# Patient Record
Sex: MALE | Race: WHITE | NOT HISPANIC OR LATINO | Employment: FULL TIME | URBAN - METROPOLITAN AREA
[De-identification: names, ages, dates, MRNs, and addresses within clinical notes are randomized per-mention and may not be internally consistent; named-entity substitution may affect disease eponyms.]

---

## 2017-06-27 ENCOUNTER — HOSPITAL ENCOUNTER (EMERGENCY)
Facility: CLINIC | Age: 32
Discharge: HOME | End: 2017-06-27
Attending: NURSE PRACTITIONER | Admitting: NURSE PRACTITIONER
Payer: COMMERCIAL

## 2017-06-27 VITALS
DIASTOLIC BLOOD PRESSURE: 81 MMHG | RESPIRATION RATE: 20 BRPM | TEMPERATURE: 97.1 F | OXYGEN SATURATION: 99 % | SYSTOLIC BLOOD PRESSURE: 137 MMHG | HEART RATE: 96 BPM

## 2017-06-27 DIAGNOSIS — H10.32 ACUTE CONJUNCTIVITIS OF LEFT EYE, UNSPECIFIED ACUTE CONJUNCTIVITIS TYPE: ICD-10-CM

## 2017-06-27 PROCEDURE — 99282 EMERGENCY DEPT VISIT SF MDM: CPT | Performed by: NURSE PRACTITIONER

## 2017-06-27 PROCEDURE — 99283 EMERGENCY DEPT VISIT LOW MDM: CPT | Mod: Z6 | Performed by: NURSE PRACTITIONER

## 2017-06-27 RX ORDER — OFLOXACIN 3 MG/ML
1 SOLUTION/ DROPS OPHTHALMIC 4 TIMES DAILY
Qty: 2 ML | Refills: 0 | Status: SHIPPED | OUTPATIENT
Start: 2017-06-27 | End: 2017-07-04

## 2017-06-27 ASSESSMENT — ENCOUNTER SYMPTOMS
EYE DISCHARGE: 1
EYE ITCHING: 1
EYE REDNESS: 1

## 2017-06-27 ASSESSMENT — VISUAL ACUITY
OS: 20/50
OD: 20/40

## 2017-06-27 NOTE — ED NOTES
Pt here with spots noted to external left eye. He has history of wood injury that caused fungal infection and lost some vision with that eye so has some anxiety of having problems with his left eye.

## 2017-06-27 NOTE — ED AVS SNAPSHOT
[facility] Emergency Department    [address] [place] [address]    Phone:  [phone]    Fax:  [phone]                                       Margarito Sofia   MRN: [id number]    Department:  [facility] Emergency Department   Date of Visit:  6/27/2017           Patient Information     Date Of Birth          1985        Your diagnoses for this visit were:     Acute conjunctivitis of left eye, unspecified acute conjunctivitis type        You were seen by Stacey Astudillo APRN CNP.      Follow-up Information     Follow up with [facility] Emergency Department.    Specialty:  EMERGENCY MEDICINE    Why:  If symptoms worsen or the nearest eye doctor to you in [place]    Contact information:    [address] [place] [address] [phone]    Additional information:    From [address]: Exit at [address] on south side of [place]. Turn right on [address]. Turn left at stoplight on [address]. [facility] will be in view two blocks ahead        Discharge Instructions         Conjunctivitis, Nonspecific    The membrane that covers the white part of your eye (the conjunctiva) is inflamed. Inflammation happens when your body responds to an injury, allergic reaction, infection, or illness. Symptoms of inflammation in the eye may include redness, irritation, itching, swelling, or burning. These symptoms should go away within the next 24 hours. Conjunctivitis may be related to a particle that was in your eye. If so, it may wash out with your tears or irrigation treatment. Being exposed to liquid chemicals or fumes may also cause this reaction.   Home care    Apply a cold pack (ice in a plastic bag, wrapped in a towel) over the eye for 20 minutes at a time. This will reduce pain.    Artificial tears may be prescribed to reduce irritation or redness.  These should be used 3 to 4 times a day.    You may use acetaminophen or  ibuprofen to control pain, unless another medicine was prescribed.(Note: If you have chronic liver or kidney disease, or if you have ever had a stomach ulcer or gastrointestinal bleeding, talk with your healthcare provider before using these medicines.)  Follow-up care  Follow up with your healthcare provider, or as advised.  When to seek medical advice  Call your healthcare provider right away if any of these occur:    Increased eyelid swelling    Increased eye pain    Increased redness or drainage from the eye    Increased blurry vision or increased sensitivity to light    Failure of normal vision to return within 24 to 48 hours  Date Last Reviewed: 6/14/2015 2000-2017 [facility]. [address] [place] [address]. All rights reserved. This information is not intended as a substitute for professional medical care. Always follow your healthcare professional's instructions.          24 Hour Appointment Hotline       To make an appointment at any [facility], call [phone] ([phone]). If you don't have a family doctor or clinic, we will help you find one. [facility] are conveniently located to serve the needs of you and your family.             Review of your medicines      START taking        Dose / Directions Last dose taken    ofloxacin 0.3 % ophthalmic solution   Commonly known as:  OCUFLOX   Dose:  1 drop   Quantity:  2 mL        Place 1 drop Into the left eye 4 times daily for 7 days   Refills:  0                Prescriptions were sent or printed at these locations (1 Prescription)                   [facility] [place], [place] - [facility] [address]   [address] [facility] [address]    Telephone:  [phone]   Fax:  [phone]   Hours:                  E-Prescribed (1 of 1)         ofloxacin (OCUFLOX) 0.3 % ophthalmic solution                Orders Needing Specimen Collection     None      Pending Results     No orders found from 6/25/2017 to  "2017.            Pending Culture Results     No orders found from 2017 to 2017.            Pending Results Instructions     If you had any lab results that were not finalized at the time of your Discharge, you can call the ED Lab Result RN at [phone]. You will be contacted by this team for any positive Lab results or changes in treatment. The nurses are available 7 days a week from 10A to 6:30P.  You can leave a message 24 hours per day and they will return your call.        Thank you for choosing [facility]       Thank you for choosing [facility] for your care. Our goal is always to provide you with excellent care. Hearing back from our patients is one way we can continue to improve our services. Please take a few minutes to complete the written survey that you may receive in the mail after you visit with us. Thank you!        [employer] Information     [employer] lets you send messages to your doctor, view your test results, renew your prescriptions, schedule appointments and more. To sign up, go to [email or web address] . Click on \"Log in\" on the left side of the screen, which will take you to the Welcome page. Then click on \"Sign up Now\" on the right side of the page.     You will be asked to enter the access code listed below, as well as some personal information. Please follow the directions to create your username and password.     Your access code is: [email or web address]  Expires: 2017  1:31 PM     Your access code will  in 90 days. If you need help or a new code, please call your [facility] clinic or [phone].        Care EveryWhere ID     This is your Care EveryWhere ID. This could be used by other organizations to access your [facility] medical records  [id number]        Equal Access to Services     LEON NASH : Chelle Leroy, zack alfaro, umesh brown. So [facility] [phone].    ATENCIÓN: Si preston chavarria " a shipley disposición servicios gratuitos de asistencia lingüística. [place] al [phone].    We comply with applicable federal civil rights laws and [place] laws. We do not discriminate on the basis of race, color, national origin, age, disability sex, sexual orientation or gender identity.            After Visit Summary       This is your record. Keep this with you and show to your community pharmacist(s) and doctor(s) at your next visit.

## 2017-06-27 NOTE — ED AVS SNAPSHOT
[facility] Emergency Department    [address] [place] [address]    Phone:  [phone]    Fax:  [phone]                                       Margarito Sofia   MRN: [id number]    Department:  [facility] Emergency Department   Date of Visit:  6/27/2017           After Visit Summary Signature Page     I have received my discharge instructions, and my questions have been answered. I have discussed any challenges I see with this plan with the nurse or doctor.    ..........................................................................................................................................  Patient/Patient Representative Signature      ..........................................................................................................................................  Patient Representative Print Name and Relationship to Patient    ..................................................               ................................................  Date                                            Time    ..........................................................................................................................................  Reviewed by Signature/Title    ...................................................              ..............................................  Date                                                            Time

## 2017-06-27 NOTE — DISCHARGE INSTRUCTIONS
Conjunctivitis, Nonspecific    The membrane that covers the white part of your eye (the conjunctiva) is inflamed. Inflammation happens when your body responds to an injury, allergic reaction, infection, or illness. Symptoms of inflammation in the eye may include redness, irritation, itching, swelling, or burning. These symptoms should go away within the next 24 hours. Conjunctivitis may be related to a particle that was in your eye. If so, it may wash out with your tears or irrigation treatment. Being exposed to liquid chemicals or fumes may also cause this reaction.   Home care    Apply a cold pack (ice in a plastic bag, wrapped in a towel) over the eye for 20 minutes at a time. This will reduce pain.    Artificial tears may be prescribed to reduce irritation or redness.  These should be used 3 to 4 times a day.    You may use acetaminophen or ibuprofen to control pain, unless another medicine was prescribed.(Note: If you have chronic liver or kidney disease, or if you have ever had a stomach ulcer or gastrointestinal bleeding, talk with your healthcare provider before using these medicines.)  Follow-up care  Follow up with your healthcare provider, or as advised.  When to seek medical advice  Call your healthcare provider right away if any of these occur:    Increased eyelid swelling    Increased eye pain    Increased redness or drainage from the eye    Increased blurry vision or increased sensitivity to light    Failure of normal vision to return within 24 to 48 hours  Date Last Reviewed: 6/14/2015 2000-2017 [employer]. [address] [place] [address]. All rights reserved. This information is not intended as a substitute for professional medical care. Always follow your healthcare professional's instructions.

## 2017-06-27 NOTE — ED PROVIDER NOTES
History     Chief Complaint   Patient presents with     Eye Problem     HPI  Margarito Sofia is a 31 year old male who presents to the ED today with c/o left eye itching and watery drainage since this morning.  Patient denies any trauma or pain.  Patient is very anxious about this as almost exactly one year ago he ended up with a traumatic ulcer in his left eye secondary to wood getting into his eye, unfortunately his injury was over his lower pupil and he ended up with vision loss.  Patient does not wear contacts or glasses and just recently moved here from [place].  He has yet to establish a primary care provider.     I have reviewed the Medications, Allergies, Past Medical and Surgical History, and Social History in the Epic system.    Allergies: No Known Allergies      No current facility-administered medications on file prior to encounter.   No current outpatient prescriptions on file prior to encounter.    There is no problem list on file for this patient.      History reviewed. No pertinent surgical history.    Social History   Substance Use Topics     Smoking status: Not on file     Smokeless tobacco: Not on file     Alcohol use Not on file         There is no immunization history on file for this patient.    BMI: There is no height or weight on file to calculate BMI.      Review of Systems   Eyes: Positive for discharge, redness and itching.   All other systems reviewed and are negative.      Physical Exam   BP: (!) 143/99  Pulse: 110  Temp: 97.1  F (36.2  C)  Resp: 16  SpO2: 98 %  Physical Exam   Constitutional: He is oriented to person, place, and time. He appears well-developed and well-nourished.   Eyes: EOM are normal. Pupils are equal, round, and reactive to light. Lids are everted and swept, no foreign bodies found. Left eye exhibits discharge (Watery). Left eye exhibits no chemosis and no hordeolum. No foreign body present in the left eye. Left conjunctiva is injected. Left conjunctiva has no  hemorrhage. No scleral icterus.   Fundoscopic exam:       The left eye shows no hemorrhage.   Slit lamp exam:       The left eye shows no corneal ulcer and no foreign body.   Neck: Normal range of motion. Neck supple.   Cardiovascular: Regular rhythm.    Tachycardic at 110   Pulmonary/Chest: Effort normal and breath sounds normal.   Musculoskeletal: Normal range of motion.   Neurological: He is alert and oriented to person, place, and time.   Skin: Skin is warm and dry. He is not diaphoretic.   Psychiatric:   Anxious       ED Course     ED Course     Procedures    Labs Ordered and Resulted from Time of ED Arrival Up to the Time of Departure from the ED - No data to display    Assessments & Plan (with Medical Decision Making)  Margarito is a 31 year old male who presents to the ED today with c/o left eye itching and watering since this morning.  Please refer to HPI and focused exam.  Patient's exam is consistent with an acute conjunctivitis, no foreign bodies identified, no trauma, no evidence of ulcerations.  Patient was reassured, I will start him on ofloxacin to cover any bacterial source, recent lives in [place], I instructed him to follow-up with an eye doctor around his area if these symptoms do not improve or worsen over the next 1-2 days.  Patient verbalizes understanding of discharge instructions and was discharged in stable condition.       I have reviewed the nursing notes.    I have reviewed the findings, diagnosis, plan and need for follow up with the patient.    Discharge Medication List as of 6/27/2017  1:31 PM      START taking these medications    Details   ofloxacin (OCUFLOX) 0.3 % ophthalmic solution Place 1 drop Into the left eye 4 times daily for 7 days, Disp-2 mL, R-0, E-Prescribe             Final diagnoses:   Acute conjunctivitis of left eye, unspecified acute conjunctivitis type       6/27/2017   [facility] EMERGENCY DEPARTMENT     Stacey Astudillo, TTAY CNP  06/27/17  [id number]

## 2019-08-03 NOTE — TELEPHONE ENCOUNTER
FUTURE VISIT INFORMATION      FUTURE VISIT INFORMATION:    Date: 8.29.19    Time: 6:00    Location: [facility]  REFERRAL INFORMATION:    Referring provider:  Dr. Donna Dooley    Referring providers clinic:  [facility]    Reason for visit/diagnosis:  New HS    RECORDS REQUESTED FROM:       Clinic name Comments Records Status Imaging Status   [facility] 6.21.19 Dr. Dooley In [facility]  [facility] Everywhere     2.8.19 Dr. Dooley In [facility]  [facility] Everywhere

## 2019-08-29 ENCOUNTER — OFFICE VISIT (OUTPATIENT)
Dept: DERMATOLOGY | Facility: CLINIC | Age: 34
End: 2019-08-29
Payer: COMMERCIAL

## 2019-08-29 ENCOUNTER — TELEPHONE (OUTPATIENT)
Dept: DERMATOLOGY | Facility: CLINIC | Age: 34
End: 2019-08-29

## 2019-08-29 ENCOUNTER — PRE VISIT (OUTPATIENT)
Dept: DERMATOLOGY | Facility: CLINIC | Age: 34
End: 2019-08-29

## 2019-08-29 DIAGNOSIS — L73.2 HIDRADENITIS SUPPURATIVA: Primary | ICD-10-CM

## 2019-08-29 RX ORDER — PREDNISONE 10 MG/1
TABLET ORAL
Qty: 147 TABLET | Refills: 0 | Status: SHIPPED | OUTPATIENT
Start: 2019-08-29 | End: 2019-10-17

## 2019-08-29 RX ORDER — KETOCONAZOLE 20 MG/ML
SHAMPOO TOPICAL
COMMUNITY
Start: 2018-06-01 | End: 2020-02-13

## 2019-08-29 RX ORDER — METHYLPREDNISOLONE SODIUM SUCCINATE 125 MG/2ML
40 INJECTION, POWDER, LYOPHILIZED, FOR SOLUTION INTRAMUSCULAR; INTRAVENOUS DAILY
Status: CANCELLED | OUTPATIENT
Start: 2019-08-29

## 2019-08-29 RX ORDER — FLUOROMETHOLONE 0.1 %
SUSPENSION, DROPS(FINAL DOSAGE FORM)(ML) OPHTHALMIC (EYE)
Refills: 5 | COMMUNITY
Start: 2019-07-16 | End: 2022-10-31

## 2019-08-29 RX ORDER — SULFAMETHOXAZOLE/TRIMETHOPRIM 800-160 MG
1 TABLET ORAL 2 TIMES DAILY
Qty: 180 TABLET | Refills: 0 | Status: SHIPPED | OUTPATIENT
Start: 2019-08-29 | End: 2019-10-17

## 2019-08-29 RX ORDER — DOXYCYCLINE HYCLATE 50 MG/1
TABLET, FILM COATED ORAL
COMMUNITY
Start: 2018-12-20 | End: 2019-10-17

## 2019-08-29 RX ORDER — RIFAMPIN 300 MG/1
CAPSULE ORAL
COMMUNITY
Start: 2019-06-21 | End: 2019-09-26

## 2019-08-29 ASSESSMENT — PAIN SCALES - GENERAL: PAINLEVEL: MODERATE PAIN (5)

## 2019-08-29 NOTE — PROGRESS NOTES
"[facility] Dermatology Note    Dermatology Surgery Clinic  [facility]  Cl[facility]  [address] [place] [address]    Dermatology Problem List:  1. Hidradenitis suppurativa.  -started on infliximab, bactrim, and prednisone 08/29/19  2. Acne vulgaris  -Has tried Accutane and a number of antibiotics    Encounter Date: Aug 29, 2019    CC:  Chief Complaint   Patient presents with     Derm Problem     Jarrod is here for concerns of HS.         History of Present Illness:  Mr. Margarito Sofia is a 33 year old male who presents in consultation for Hidradenitis suppurativa. The patient reports that his HS started about a year ago. He states that the last doctor he saw when he moved here told him he had HS. He notes that his HS symptoms have worsened in the last couple of months.    Denies diarrhea and constipation. Over the last year, he states that he has gained about 15-20 lbs. He reports that he is quite stressed but not depressed. On a pain scale of 1-10, the patient reports his pain a 5. However, he has a stabbing sensation on the extremities and abdomen. He denies that he smokes.     Regarding prior medications, he has been on antibiotics for acne almost all his life. He feels that antibiotics help to caron symptoms but there was no significant help. He has tried Bactrim with some relief. The patient admits to doing 2 cycles of Accutane for acne as well, but the acne would return for a year or two. He did Accutane at [age], so it's been about 11 years. He shares that he did have a \"bad eye injury\" about 2 years ago that he took antifungals for almost a year with no issues. 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  He has also tried clindamycin and rifampin for 2 months now, but it is not helping. He shares that his mother has taken Humira due to inflammatory bowel with side effects.   He states that " he has tried Prednisone for 1.5 weeks in the past and it helped with pain and inflammation.    Within the last year, he shares that he has moved out here from [place] for work. Otherwise, there have been no other changes that he can think of. The patient shares that he is a .  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    On exam, the patient was noted to have a mole on the R forehead. He states that it has been there awhile.      Past Medical History:   Acne s/p isotretinoin  Traumatic fungal infection of eye s/p 1 year of antifungals completed 1 year ago  Heart murmur per patient     Social History:  Patient reports that he has never smoked. He has never used smokeless tobacco.    Family History:  Mother with inflammatory bowel disease    Medications:  Current Outpatient Medications   Medication Sig Dispense Refill     Doxycycline Hyclate 50 MG TABS        fluorometholone (FML LIQUIFILM) 0.1 % ophthalmic suspension SHAKE LQ AND INT 1 GTT IN OU D  5     ketoconazole (NIZORAL) 2 % external shampoo Apply to the scalp and upper body as directed twice per week.       rifampin (RIFADIN) 300 MG capsule 1 tab po BID       chlorhexidine (HIBICLENS) 4 % liquid Wash boil-prone areas three times weekly while in shower         No Known Allergies    Review of Systems:  -As per HPI  -Constitutional: Otherwise feeling well today, in usual state of health.  -Skin: As above in HPI. No additional skin concerns.    Physical exam  GEN: This is a well developed, well-nourished male in no acute distress, in a pleasant mood.    SKIN: Full skin, which includes the head/face, both arms, chest, back, abdomen,both legs, genitalia and/or groin buttocks, digits and/or nails, was examined.  Scattered and closed comedones and pustules around the mouth and nose  Dark brown 3 mm macule on R forehead inglycan appearance with dots and a hint of reticular network  Draining tunneling plaque on posterior neck  Numerous  acneiform throughout mouth  Scattered acneiform papules through the dorsal neck and chest  2% BSA draining plaque R axilla; 3+ erythema, 1+ induration, 3+ drainage  1 inflammatory in the L axilla and a few inflammatory papules  -Linear erosions in supergluteal cleft  -Ulceration along the superperirectal area, 3mm  -Scattered inflammatory acneiform papules on the buttock  -5 cm abscess on the L abdomen  -4% BSA violaceous plaques with 1 cm ulceration in the suprapubic region extending into the lower abdominal fold  -Hints of linear ulcerations in the L inguinal fold  -Violaceous nodules coalescing into inflammatory plaque  -1% BSA in R and L inguinal fold  -Inflammatory nodules throughout the scrotum  -No other lesions of concern on areas examined.       Impression/Plan:  1. Hidradenitis suppurativa.    The patient was started on infliximab q4 weeks with 40mg methlypred    Labs: quantiferon gold, rubella, mumps, CBC w/platelets, comprehensive metabolic panel, Hep B core antibody, Hep B surface antibody, Hep B surface antigen, Hep C antibody, HIV antigen antibody combo,     Prescribed the patient Prednisone 60mg x7 days, 50mg x7 days, 40mg x7 days, 30mg x7 days, 20mg x 7 days, 10mg x 7 days. We will give the Prednisone for about 8 weeks to work and we will see how the patient does.      Follow up in about 6 weeks.  Staff Involved:    Scribe Disclosure  I, Michelle Hunter, am serving as a scribe to document services personally performed by Dr. Dennis Denny, based on data collection and the provider's statements to me.     Provider Disclosure:   The documentation recorded by the scribe accurately reflects the services I personally performed and the decisions made by me.    Dennis Denny MD, FAAD    Departments of Internal Medicine and Dermatology  [facility]  [phone]

## 2019-08-29 NOTE — LETTER
"8/29/2019       RE: Margarito Sofia  [address] [facility] [address]     Dear Colleague,    Thank you for referring your patient, Margarito Sofia, to the [facility] DERMATOLOGY at [facility]. Please see a copy of my visit note below.    [facility] Dermatology Note    Dermatology Surgery Clinic  [facility]  [address] [place] [id number]    Dermatology Problem List:  1. Hidradenitis suppurativa.  -started on infliximab, bactrim, and prednisone 08/29/19  2. Acne vulgaris  -Has tried Accutane and a number of antibiotics    Encounter Date: Aug 29, 2019    CC:  Chief Complaint   Patient presents with     Derm Problem     Jarrod is here for concerns of HS.         History of Present Illness:  Mr. Margarito Sofia is a 33 year old male who presents in consultation for Hidradenitis suppurativa. The patient reports that his HS started about a year ago. He states that the last doctor he saw when he moved here told him he had HS. He notes that his HS symptoms have worsened in the last couple of months.    Denies diarrhea and constipation. Over the last year, he states that he has gained about 15-20 lbs. He reports that he is quite stressed but not depressed. On a pain scale of 1-10, the patient reports his pain a 5. However, he has a stabbing sensation on the extremities and abdomen. He denies that he smokes.     Regarding prior medications, he has been on antibiotics for acne almost all his life. He feels that antibiotics help to caron symptoms but there was no significant help. He has tried Bactrim with some relief. The patient admits to doing 2 cycles of Accutane for acne as well, but the acne would return for a year or two. He did Accutane at [age], so it's been about 11 years. He shares that he did have a \"bad eye injury\" about 2 years ago that he took antifungals for almost a year with no issues. 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  He has also tried clindamycin and rifampin for 2 months now, but it is not helping. He shares that his mother has taken Humira due to inflammatory bowel with side effects.   He states that he has tried Prednisone for 1.5 weeks in the past and it helped with pain and inflammation.    Within the last year, he shares that he has moved out here from [place] for work. Otherwise, there have been no other changes that he can think of. The patient shares that he is a .  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    On exam, the patient was noted to have a mole on the R forehead. He states that it has been there awhile.      Past Medical History:   Acne s/p isotretinoin  Traumatic fungal infection of eye s/p 1 year of antifungals completed 1 year ago  Heart murmur per patient     Social History:  Patient reports that he has never smoked. He has never used smokeless tobacco.    Family History:  Mother with inflammatory bowel disease    Medications:  Current Outpatient Medications   Medication Sig Dispense Refill     Doxycycline Hyclate 50 MG TABS        fluorometholone (FML LIQUIFILM) 0.1 % ophthalmic suspension SHAKE LQ AND INT 1 GTT IN OU D  5     ketoconazole (NIZORAL) 2 % external shampoo Apply to the scalp and upper body as directed twice per week.       rifampin (RIFADIN) 300 MG capsule 1 tab po BID       chlorhexidine (HIBICLENS) 4 % liquid Wash boil-prone areas three times weekly while in shower         No Known Allergies    Review of Systems:  -As per HPI  -Constitutional: Otherwise feeling well today, in usual state of health.  -Skin: As above in HPI. No additional skin concerns.    Physical exam  GEN: This is a well developed, well-nourished male in no acute distress, in a pleasant mood.    SKIN: Full skin, which includes the head/face, both arms, chest, back, abdomen,both legs,  genitalia and/or groin buttocks, digits and/or nails, was examined.  Scattered and closed comedones and pustules around the mouth and nose  Dark brown 3 mm macule on R forehead inglycan appearance with dots and a hint of reticular network  Draining tunneling plaque on posterior neck  Numerous acneiform throughout mouth  Scattered acneiform papules through the dorsal neck and chest  2% BSA draining plaque R axilla; 3+ erythema, 1+ induration, 3+ drainage  1 inflammatory in the L axilla and a few inflammatory papules  -Linear erosions in supergluteal cleft  -Ulceration along the superperirectal area, 3mm  -Scattered inflammatory acneiform papules on the buttock  -5 cm abscess on the L abdomen  -4% BSA violaceous plaques with 1 cm ulceration in the suprapubic region extending into the lower abdominal fold  -Hints of linear ulcerations in the L inguinal fold  -Violaceous nodules coalescing into inflammatory plaque  -1% BSA in R and L inguinal fold  -Inflammatory nodules throughout the scrotum  -No other lesions of concern on areas examined.       Impression/Plan:  1. Hidradenitis suppurativa.    The patient was started on infliximab q4 weeks with 40mg methlypred    Labs: quantiferon gold, rubella, mumps, CBC w/platelets, comprehensive metabolic panel, Hep B core antibody, Hep B surface antibody, Hep B surface antigen, Hep C antibody, HIV antigen antibody combo,     Prescribed the patient Prednisone 60mg x7 days, 50mg x7 days, 40mg x7 days, 30mg x7 days, 20mg x 7 days, 10mg x 7 days. We will give the Prednisone for about 8 weeks to work and we will see how the patient does.      Follow up in about 6 weeks.  Staff Involved:    Scribe Disclosure  I, Michelle Hunter, am serving as a scribe to document services personally performed by Dr. Dennis Denny, based on data collection and the provider's statements to me.     Provider Disclosure:   The documentation recorded by the scribe accurately reflects the services I personally  performed and the decisions made by me.    Dennis Denny MD, FAAD    Departments of Internal Medicine and Dermatology  [facility]  [phone]        Again, thank you for allowing me to participate in the care of your patient.      Sincerely,    Dennis Denny MD

## 2019-08-29 NOTE — LETTER
Date:September 6, 2019      Patient was self referred, no letter generated. Do not send.        [facility] Health Information

## 2019-08-29 NOTE — NURSING NOTE
Dermatology Rooming Note    Margarito Sofia's goals for this visit include:   Chief Complaint   Patient presents with     Derm Problem     Jarrod is here for concerns of HS.         Deandra Esteves LPN

## 2019-08-30 DIAGNOSIS — L73.2 HIDRADENITIS SUPPURATIVA: ICD-10-CM

## 2019-08-30 LAB
ALBUMIN SERPL-MCNC: 3.1 G/DL (ref 3.4–5)
ALP SERPL-CCNC: 137 U/L (ref 40–150)
ALT SERPL W P-5'-P-CCNC: 20 U/L (ref 0–70)
ANION GAP SERPL CALCULATED.3IONS-SCNC: 6 MMOL/L (ref 3–14)
AST SERPL W P-5'-P-CCNC: 12 U/L (ref 0–45)
BILIRUB SERPL-MCNC: 0.2 MG/DL (ref 0.2–1.3)
BUN SERPL-MCNC: 9 MG/DL (ref 7–30)
CALCIUM SERPL-MCNC: 9 MG/DL (ref 8.5–10.1)
CHLORIDE SERPL-SCNC: 104 MMOL/L (ref 94–109)
CO2 SERPL-SCNC: 28 MMOL/L (ref 20–32)
CREAT SERPL-MCNC: 0.82 MG/DL (ref 0.66–1.25)
ERYTHROCYTE [DISTWIDTH] IN BLOOD BY AUTOMATED COUNT: 16.2 % (ref 10–15)
GFR SERPL CREATININE-BSD FRML MDRD: >90 ML/MIN/{1.73_M2}
GLUCOSE SERPL-MCNC: 98 MG/DL (ref 70–99)
HBV CORE AB SERPL QL IA: NONREACTIVE
HBV SURFACE AB SERPL IA-ACNC: 58.75 M[IU]/ML
HBV SURFACE AG SERPL QL IA: NONREACTIVE
HCT VFR BLD AUTO: 37.6 % (ref 40–53)
HCV AB SERPL QL IA: NONREACTIVE
HGB BLD-MCNC: 11.3 G/DL (ref 13.3–17.7)
HIV 1+2 AB+HIV1 P24 AG SERPL QL IA: NONREACTIVE
MCH RBC QN AUTO: 24.2 PG (ref 26.5–33)
MCHC RBC AUTO-ENTMCNC: 30.1 G/DL (ref 31.5–36.5)
MCV RBC AUTO: 81 FL (ref 78–100)
PLATELET # BLD AUTO: 528 10E9/L (ref 150–450)
POTASSIUM SERPL-SCNC: 3.9 MMOL/L (ref 3.4–5.3)
PROT SERPL-MCNC: 8.3 G/DL (ref 6.8–8.8)
RBC # BLD AUTO: 4.67 10E12/L (ref 4.4–5.9)
SODIUM SERPL-SCNC: 138 MMOL/L (ref 133–144)
WBC # BLD AUTO: 15.2 10E9/L (ref 4–11)

## 2019-08-30 NOTE — TELEPHONE ENCOUNTER
Patient to begin infusions asap. Sending to [facility] to check insurance coverage.      Deandra Esteves LPN

## 2019-08-31 LAB
MEV IGG SER QL IA: 7.2 AI (ref 0–0.8)
MUV IGG SER QL IA: 3.6 AI (ref 0–0.8)
RUBV IGG SERPL IA-ACNC: 69 IU/ML

## 2019-09-03 LAB
GAMMA INTERFERON BACKGROUND BLD IA-ACNC: 0.06 IU/ML
M TB IFN-G BLD-IMP: NEGATIVE
M TB IFN-G CD4+ BCKGRND COR BLD-ACNC: >10 IU/ML
MITOGEN IGNF BCKGRD COR BLD-ACNC: 0 IU/ML
MITOGEN IGNF BCKGRD COR BLD-ACNC: 0 IU/ML

## 2019-09-12 ENCOUNTER — INFUSION THERAPY VISIT (OUTPATIENT)
Dept: INFUSION THERAPY | Facility: CLINIC | Age: 34
End: 2019-09-12
Attending: DERMATOLOGY
Payer: COMMERCIAL

## 2019-09-12 VITALS
HEART RATE: 92 BPM | SYSTOLIC BLOOD PRESSURE: 119 MMHG | TEMPERATURE: 97.6 F | DIASTOLIC BLOOD PRESSURE: 82 MMHG | WEIGHT: 290 LBS

## 2019-09-12 DIAGNOSIS — L73.2 HIDRADENITIS SUPPURATIVA: Primary | ICD-10-CM

## 2019-09-12 PROCEDURE — 25000128 H RX IP 250 OP 636: Mod: ZF | Performed by: DERMATOLOGY

## 2019-09-12 PROCEDURE — 96413 CHEMO IV INFUSION 1 HR: CPT

## 2019-09-12 PROCEDURE — 96415 CHEMO IV INFUSION ADDL HR: CPT

## 2019-09-12 PROCEDURE — 25800030 ZZH RX IP 258 OP 636: Mod: ZF | Performed by: DERMATOLOGY

## 2019-09-12 RX ORDER — METHYLPREDNISOLONE SODIUM SUCCINATE 125 MG/2ML
40 INJECTION, POWDER, LYOPHILIZED, FOR SOLUTION INTRAMUSCULAR; INTRAVENOUS DAILY
Status: CANCELLED | OUTPATIENT
Start: 2019-09-26

## 2019-09-12 RX ADMIN — INFLIXIMAB 700 MG: 100 INJECTION, POWDER, LYOPHILIZED, FOR SOLUTION INTRAVENOUS at 07:34

## 2019-09-12 NOTE — PATIENT INSTRUCTIONS
Patient Education     Infliximab Solution for injection  What is this medicine?  INFLIXIMAB (in FLIX i mab) is used to treat Crohn's disease and ulcerative colitis. It is also used to treat ankylosing spondylitis, psoriasis, and some forms of arthritis.  This medicine may be used for other purposes; ask your health care provider or pharmacist if you have questions.  What should I tell my health care provider before I take this medicine?  They need to know if you have any of these conditions:    diabetes    exposure to tuberculosis    heart failure    hepatitis or liver disease    immune system problems    infection    lung or breathing disease, like COPD    multiple sclerosis    current or past resident of [place]    seizure disorder    an unusual or allergic reaction to infliximab, mouse proteins, other medicines, foods, dyes, or preservatives    pregnant or trying to get pregnant    breast-feeding  How should I use this medicine?  This medicine is for injection into a vein. It is usually given by a health care professional in a hospital or clinic setting.  A special MedGuide will be given to you by the pharmacist with each prescription and refill. Be sure to read this information carefully each time.  Talk to your pediatrician regarding the use of this medicine in children. Special care may be needed.  Overdosage: If you think you have taken too much of this medicine contact a poison control center or emergency room at once.  NOTE: This medicine is only for you. Do not share this medicine with others.  What if I miss a dose?  It is important not to miss your dose. Call your doctor or health care professional if you are unable to keep an appointment.  What may interact with this medicine?  Do not take this medicine with any of the following medications:    anakinra    rilonacept  This medicine may also interact with the following medications:    vaccines  This list may not describe all  possible interactions. Give your health care provider a list of all the medicines, herbs, non-prescription drugs, or dietary supplements you use. Also tell them if you smoke, drink alcohol, or use illegal drugs. Some items may interact with your medicine.  What should I watch for while using this medicine?  Visit your doctor or health care professional for regular checks on your progress.  If you get a cold or other infection while receiving this medicine, call your doctor or health care professional. Do not treat yourself. This medicine may decrease your body's ability to fight infections. Before beginning therapy, your doctor may do a test to see if you have been exposed to tuberculosis.  This medicine may make the symptoms of heart failure worse in some patients. If you notice symptoms such as increased shortness of breath or swelling of the ankles or legs, contact your health care provider right away.  If you are going to have surgery or dental work, tell your health care professional or dentist that you have received this medicine.  If you take this medicine for plaque psoriasis, stay out of the sun. If you cannot avoid being in the sun, wear protective clothing and use sunscreen. Do not use sun lamps or tanning beds/booths.  What side effects may I notice from receiving this medicine?  Side effects that you should report to your doctor or health care professional as soon as possible:    allergic reactions like skin rash, itching or hives, swelling of the face, lips, or tongue    chest pain    fever or chills, usually related to the infusion    muscle or joint pain    red, scaly patches or raised bumps on the skin    signs of infection - fever or chills, cough, sore throat, pain or difficulty passing urine    swollen lymph nodes in the neck, underarm, or groin areas    unexplained weight loss    unusual bleeding or bruising    unusually weak or tired    yellowing of the eyes or skin  Side effects that usually  do not require medical attention (report to your doctor or health care professional if they continue or are bothersome):    headache    heartburn or stomach pain    nausea, vomiting  This list may not describe all possible side effects. Call your doctor for medical advice about side effects. You may report side effects to FDA at 8-469-FDA-1859.  Where should I keep my medicine?  This drug is given in a hospital or clinic and will not be stored at home.  NOTE:This sheet is a summary. It may not cover all possible information. If you have questions about this medicine, talk to your doctor, pharmacist, or health care provider. Copyright  2016 Gold Standard

## 2019-09-12 NOTE — PROGRESS NOTES
Nursing Note  Margarito Sofia [facility] for:   Chief Complaint   Patient presents with     Infusion     Remicade     During today's [facility] appointment, orders from Dr. Denny were completed.  Frequency: today is dose #1 of 2 Q14d, then week 6, then Q4 weeks.    Progress note:  Patient identification verified by name and date of birth.  Assessment completed.  Vitals recorded in Doc Flowsheets.  Patient was provided with education regarding infusion and possible side effects.  Patient verbalized understanding.     present during visit today: Not Applicable.    Treatment Conditions: ~~~ NOTE: If the patient answers yes to any of the questions below, hold the infusion and contact ordering provider or on-call provider.    1. Have you recently had an elevated temperature, fever, chills, productive cough, coughing for 3 weeks or longer or hemoptysis, abnormal vital signs, night sweats,  chest pain or have you noticed a decrease in your appetite, unexplained weight loss or fatigue? No  2. Do you have any open wounds or new incisions? Yes, HS; MD aware- no s/s infection   3. Do you have any recent or upcoming hospitalizations, surgeries or dental procedures? No  4. Do you currently have or recently have had any signs of illness or infection or are you on any antibiotics? Yes, pt is on Bactrim, but has been on abx for >4 months for HS and reports no active signs of infection  5. Have you had any new, sudden or worsening abdominal pain? No  6. Have you or anyone in your household received a live vaccination in the past 4 weeks? Please note:  No live vaccines while on biologic/chemotherapy until 6 months after the last treatment.  Patient can receive the flu vaccine (shot only) and the pneumovax.  It is optimal for the patient to get these vaccines mid cycle, but they can be given at any time as long as it is not on the day of the  infusion. No  7. Have you recently been diagnosed with any new nervous system diseases (ie. Multiple sclerosis, Guillain [place], seizures, neurological changes) or cancer diagnosis? No  8. Are you on any form of radiation or chemotherapy? No  9. Are you pregnant or breast feeding or do you have plans of pregnancy in the future? No  10. Have you been having any signs of worsening depression or suicidal ideations?  (benlysta only) No  11. Have there been any other new onset medical symptoms? No      Premedications: were not ordered.    Drug Waste Record: No    Infusion length and rate:  infusion given over approximately 2 hours  173 ml/hr.    Labs: were not ordered for this appointment.    Vascular access: peripheral IV placed today.    Post Infusion Assessment:  Patient tolerated infusion without incident.     Discharge Plan:   Follow up plan of care with: ongoing infusions at [facility]. and primary medical doctor.  Discharge instructions were reviewed with patient.  Patient/representative verbalized understanding of discharge instructions and all questions answered.  Patient discharged from [facility] in stable condition.    Jeana Benavides RN  Administrations This Visit     inFLIXimab (REMICADE) 700 mg in sodium chloride 0.9 % 345 mL infusion     Admin Date  09/12/2019 Action  New Bag Dose  700 mg Rate  172.5 mL/hr Route  Intravenous Administered By  Jeana Benavides RN                    /81   Pulse 95   Temp 97.6  F (36.4  C) (Oral)   Wt 131.5 kg (290 lb)

## 2019-09-26 ENCOUNTER — INFUSION THERAPY VISIT (OUTPATIENT)
Dept: INFUSION THERAPY | Facility: CLINIC | Age: 34
End: 2019-09-26
Attending: DERMATOLOGY
Payer: COMMERCIAL

## 2019-09-26 VITALS
DIASTOLIC BLOOD PRESSURE: 80 MMHG | WEIGHT: 298.7 LBS | SYSTOLIC BLOOD PRESSURE: 129 MMHG | OXYGEN SATURATION: 100 % | TEMPERATURE: 97.5 F | HEART RATE: 77 BPM

## 2019-09-26 DIAGNOSIS — L73.2 HIDRADENITIS SUPPURATIVA: Primary | ICD-10-CM

## 2019-09-26 PROCEDURE — 25000128 H RX IP 250 OP 636: Mod: ZF | Performed by: DERMATOLOGY

## 2019-09-26 PROCEDURE — 96413 CHEMO IV INFUSION 1 HR: CPT

## 2019-09-26 PROCEDURE — 25800030 ZZH RX IP 258 OP 636: Mod: ZF | Performed by: DERMATOLOGY

## 2019-09-26 PROCEDURE — 96416 CHEMO PROLONG INFUSE W/PUMP: CPT

## 2019-09-26 PROCEDURE — 96415 CHEMO IV INFUSION ADDL HR: CPT

## 2019-09-26 PROCEDURE — 40000556 ZZH STATISTIC PERIPHERAL IV START W US GUIDANCE: Mod: ZF

## 2019-09-26 RX ORDER — METHYLPREDNISOLONE SODIUM SUCCINATE 125 MG/2ML
40 INJECTION, POWDER, LYOPHILIZED, FOR SOLUTION INTRAMUSCULAR; INTRAVENOUS DAILY
Status: CANCELLED | OUTPATIENT
Start: 2019-10-10

## 2019-09-26 RX ADMIN — SODIUM CHLORIDE 50 ML: 9 INJECTION, SOLUTION INTRAVENOUS at 07:56

## 2019-09-26 RX ADMIN — INFLIXIMAB 700 MG: 100 INJECTION, POWDER, LYOPHILIZED, FOR SOLUTION INTRAVENOUS at 07:56

## 2019-09-26 NOTE — PROGRESS NOTES
Nursing Note  Margarito Sofia presents [facility] to [facility] for:   Chief Complaint   Patient presents with     Infusion     Infliximab (remicade)     During [facility]'s [facility] appointment, orders from Dr. Dennis Denny were completed.  Frequency: today is dose #2 of 2 q14d, then week 6, then q4 weeks.    Progress note:  Patient identification verified by name and date of birth.  Assessment completed.  Vitals recorded in Doc Flowsheets.  Patient was provided with education regarding infusion and possible side effects.  Patient verbalized understanding.     present during visit today: Not Applicable.    Treatment Conditions: patient denies fever, chills, signs of infection, recent illness, on antibiotics, productive cough or elevated temperature.    Premedications: historically patient has not taken pre-medications prior to infusion.  Drug Waste Record: No  Infusion length and rate:  173 ml/hr., over 2 hours  Labs: were not ordered for this appointment.  Vascular access: peripheral IV was placed by vascular access prior to appointment.    Post Infusion Assessment:  Patient tolerated infusion without incident.     Discharge Plan:   Follow up plan of care with: ongoing infusions at [facility].  Discharge instructions were reviewed with patient.  Patient/representative verbalized understanding of discharge instructions and all questions answered.  Patient discharged from [facility] in stable condition.    Yanni Esteves, STEVIE    Administrations This Visit     0.9% sodium chloride BOLUS     Admin Date  09/26/2019 Action  New Bag Dose  50 mL Route  Intravenous Administered By  Yanni Esteves RN          inFLIXimab (REMICADE) 700 mg in sodium chloride 0.9 % 345 mL infusion     Admin Date  09/26/2019 Action  New Bag Dose  700 mg Rate  172.5 mL/hr Route  Intravenous Administered By  Danielito  Yanni BAUTISTA RN                /76   Pulse 77   Temp 97.5  F (36.4  C) (Oral)   Wt 135.5 kg (298 lb 11.2 oz)   SpO2 100%

## 2019-09-26 NOTE — PATIENT INSTRUCTIONS
Dear Margarito Sofia    Thank you for choosing [facility] ([facility]) for your infusion.  The following information is a summary of our appointment as well as important reminders.      Your infusion today of inFLIXimab (REMICADE) 700 mg in sodium chloride 0.9 % 345 mL infusion       We look forward in seeing you on your next appointment here at [facility] ([facility]).  Please don t hesitate to call us at [phone] to reschedule any of your appointments or to speak with one of the [facility] registered nurses.  It was a pleasure taking care of you today.    Sincerely,    [facility]  [facility]  [address] [place]  [address]  Phone:  [phone]  Patient Education   Infliximab Solution for injection  What is this medicine?  INFLIXIMAB (in FLIX i mab) is used to treat Crohn's disease and ulcerative colitis. It is also used to treat ankylosing spondylitis, psoriasis, and some forms of arthritis.  This medicine may be used for other purposes; ask your health care provider or pharmacist if you have questions.  What should I tell my health care provider before I take this medicine?  They need to know if you have any of these conditions:    diabetes    exposure to tuberculosis    heart failure    hepatitis or liver disease    immune system problems    infection    lung or breathing disease, like COPD    multiple sclerosis    current or past resident of [place]    seizure disorder    an unusual or allergic reaction to infliximab, mouse proteins, other medicines, foods, dyes, or preservatives    pregnant or trying to get pregnant    breast-feeding  How should I use this medicine?  This medicine is for injection into a vein. It is usually given by a health care professional in a hospital or clinic setting.  A special MedGuide will be given to you by  the pharmacist with each prescription and refill. Be sure to read this information carefully each time.  Talk to your pediatrician regarding the use of this medicine in children. Special care may be needed.  Overdosage: If you think you have taken too much of this medicine contact a poison control center or emergency room at once.  NOTE: This medicine is only for you. Do not share this medicine with others.  What if I miss a dose?  It is important not to miss your dose. Call your doctor or health care professional if you are unable to keep an appointment.  What may interact with this medicine?  Do not take this medicine with any of the following medications:    anakinra    rilonacept  This medicine may also interact with the following medications:    vaccines  This list may not describe all possible interactions. Give your health care provider a list of all the medicines, herbs, non-prescription drugs, or dietary supplements you use. Also tell them if you smoke, drink alcohol, or use illegal drugs. Some items may interact with your medicine.  What should I watch for while using this medicine?  Visit your doctor or health care professional for regular checks on your progress.  If you get a cold or other infection while receiving this medicine, call your doctor or health care professional. Do not treat yourself. This medicine may decrease your body's ability to fight infections. Before beginning therapy, your doctor may do a test to see if you have been exposed to tuberculosis.  This medicine may make the symptoms of heart failure worse in some patients. If you notice symptoms such as increased shortness of breath or swelling of the ankles or legs, contact your health care provider right away.  If you are going to have surgery or dental work, tell your health care professional or dentist that you have received this medicine.  If you take this medicine for plaque psoriasis, stay out of the sun. If you cannot avoid  being in the sun, wear protective clothing and use sunscreen. Do not use sun lamps or tanning beds/booths.  What side effects may I notice from receiving this medicine?  Side effects that you should report to your doctor or health care professional as soon as possible:    allergic reactions like skin rash, itching or hives, swelling of the face, lips, or tongue    chest pain    fever or chills, usually related to the infusion    muscle or joint pain    red, scaly patches or raised bumps on the skin    signs of infection - fever or chills, cough, sore throat, pain or difficulty passing urine    swollen lymph nodes in the neck, underarm, or groin areas    unexplained weight loss    unusual bleeding or bruising    unusually weak or tired    yellowing of the eyes or skin  Side effects that usually do not require medical attention (report to your doctor or health care professional if they continue or are bothersome):    headache    heartburn or stomach pain    nausea, vomiting  This list may not describe all possible side effects. Call your doctor for medical advice about side effects. You may report side effects to FDA at 9-731-FDA-7225.  Where should I keep my medicine?  This drug is given in a hospital or clinic and will not be stored at home.  NOTE:This sheet is a summary. It may not cover all possible information. If you have questions about this medicine, talk to your doctor, pharmacist, or health care provider. Copyright  2016 Gold Standard

## 2019-10-17 ENCOUNTER — OFFICE VISIT (OUTPATIENT)
Dept: DERMATOLOGY | Facility: CLINIC | Age: 34
End: 2019-10-17
Payer: COMMERCIAL

## 2019-10-17 DIAGNOSIS — Z79.899 ENCOUNTER FOR LONG-TERM (CURRENT) USE OF HIGH-RISK MEDICATION: ICD-10-CM

## 2019-10-17 DIAGNOSIS — L73.2 HIDRADENITIS SUPPURATIVA: Primary | ICD-10-CM

## 2019-10-17 RX ORDER — FOLIC ACID 1 MG/1
1 TABLET ORAL DAILY
Qty: 90 TABLET | Refills: 3 | Status: SHIPPED | OUTPATIENT
Start: 2019-10-17 | End: 2020-02-13

## 2019-10-17 RX ORDER — METHOTREXATE 2.5 MG/1
15 TABLET ORAL WEEKLY
Qty: 24 TABLET | Refills: 3 | Status: SHIPPED | OUTPATIENT
Start: 2019-10-17 | End: 2020-02-13

## 2019-10-17 ASSESSMENT — PAIN SCALES - GENERAL: PAINLEVEL: NO PAIN (0)

## 2019-10-17 NOTE — PROGRESS NOTES
[facility] Dermatology Note     Dermatology Surgery Clinic  [facility]  Clinics and Surgery Center  [address] [place] [address]     Dermatology Problem List:  1. Hidradenitis suppurativa.  -started on infliximab, bactrim, and prednisone 08/29/19  2. Acne vulgaris  -Has tried Accutane and a number of antibiotics     Encounter Date: October 17, 2019     Last visit: Aug 29, 2019     CC:  Chief Complaint   Patient presents with     Derm Problem     Jarrod is here for a HS follow up, states it improved with the prednisone, but new spots are appearing since being off of it.       Interval Hx (10/17/19)  Pt notes after his infusions and prednisone he is feeling much better. He has now had 2 infusions, now 4 weeks out. Infliximab is scheduled every 4 weeks. No side effects. No issues with infections. Has felt more tired. He is extremely fatigued. Falling asleep at desk. Gained 5-10 lbs on prednisone. Has been off for 2 weeks.  He was on it for 2 months.  No fevers. No night sweats.     Initial HPI (8/29/19):  Mr. Margarito Sofia is a 33 year old male who presents in consultation for Hidradenitis suppurativa. The patient reports that his HS started about a year ago. He states that the last doctor he saw when he moved here told him he had HS. He notes that his HS symptoms have worsened in the last couple of months.     Denies diarrhea and constipation. Over the last year, he states that he has gained about 15-20 lbs. He reports that he is quite stressed but not depressed. On a pain scale of 1-10, the patient reports his pain a 5. However, he has a stabbing sensation on the extremities and abdomen. He denies that he smokes.      Regarding prior medications, he has been on antibiotics for acne almost all his life. He feels that antibiotics help to caron symptoms but there was no significant help. He has tried Bactrim with some relief. The patient admits to doing 2 cycles of  "Accutane for acne as well, but the acne would return for a year or two. He did Accutane at [age], so it's been about 11 years. He shares that he did have a \"bad eye injury\" about 2 years ago that he took antifungals for almost a year with no issues. 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  He has also tried clindamycin and rifampin for 2 months now, but it is not helping. He shares that his mother has taken Humira due to inflammatory bowel with side effects.   He states that he has tried Prednisone for 1.5 weeks in the past and it helped with pain and inflammation.     Within the last year, he shares that he has moved out here from [place] for work. Otherwise, there have been no other changes that he can think of. The patient shares that he is a .  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     On exam, the patient was noted to have a mole on the R forehead. He states that it has been there awhile.      Past Medical History:   Acne s/p isotretinoin  Traumatic fungal infection of eye s/p 1 year of antifungals completed 1 year ago  Heart murmur per patient      Social History:  Patient reports that he has never smoked. He has never used smokeless tobacco.     Family History:   Mother with inflammatory bowel disease     Medications:  Current Outpatient Prescriptions          Current Outpatient Medications   Medication Sig Dispense Refill     Doxycycline Hyclate 50 MG TABS           fluorometholone (FML LIQUIFILM) 0.1 % ophthalmic suspension SHAKE LQ AND INT 1 GTT IN OU D   5     ketoconazole (NIZORAL) 2 % external shampoo Apply to the scalp and upper body as directed twice per week.         rifampin (RIFADIN) 300 MG capsule 1 tab po BID         chlorhexidine (HIBICLENS) 4 % liquid Wash boil-prone areas three times weekly while in shower                No Known Allergies     Review of Systems:  -As per HPI  -Constitutional: " Otherwise feeling well today, in usual state of health.  -Skin: As above in HPI. No additional skin concerns.     Physical exam  GEN: This is a well developed, well-nourished male in no acute distress, in a pleasant mood.    SKIN: Full skin, which includes the head/face, both arms, chest, back, abdomen,both legs, genitalia and/or groin buttocks, digits and/or nails, was examined.  Scattered and closed comedones and pustules around the mouth and nose    3 pink inflammatory papules in rt axilla, one slightly ulcerated. The rest are all healing pink papules scattered  Fairly diffusely in axilla.   6 pink papule in left axilla with non-ninflamed cord.  Diffuse pink to purple papules almost coalescing into a diffuse plaque on suprapubic region, upper thighs and inguinal folds b/l. No nodules or tunnels.   Scattered purple inflammatory papules.   Back of neck clear       Previous exam:  Dark brown 3 mm macule on R forehead inglycan appearance with dots and a hint of reticular network  Draining tunneling plaque on posterior neck  Numerous acneiform throughout mouth  Scattered acneiform papules through the dorsal neck and chest  2% BSA draining plaque R axilla; 3+ erythema, 1+ induration, 3+ drainage  1 inflammatory in the L axilla and a few inflammatory papules  -Linear erosions in supergluteal cleft  -Ulceration along the superperirectal area, 3mm  -Scattered inflammatory acneiform papules on the buttock  -5 cm abscess on the L abdomen  -4% BSA violaceous plaques with 1 cm ulceration in the suprapubic region extending into the lower abdominal fold  -Hints of linear ulcerations in the L inguinal fold  -Violaceous nodules coalescing into inflammatory plaque  -1% BSA in R and L inguinal fold  -Inflammatory nodules throughout the scrotum  -No other lesions of concern on areas examined.         Impression/Plan:  1. Hidradenitis suppurativa.    The patient has been on infliximab q4 weeks with 40mg methlypred for about 8 weeks.  Continue. No side effects.    Discontinue bactrim    Start methotrexate 15mg weekly and daily methotrexate. Discuss risks.    Labs in 2 and 4 weeks    F/u in 4 weeks    2. Fatigue  - ? from tapering off prednisone.   - Checking all labs in 2 weeks and will see how he is feeling, especially with start of methotrexate. No concern for infection.     Follow up in about 4 weeks     Dennis Denny MD, FAAD    Departments of Internal Medicine and Dermatology  [facility]  [phone]

## 2019-10-17 NOTE — LETTER
10/17/2019       RE: Margarito Sofia  [address] [facility] [address]     Dear Colleague,    Thank you for referring your patient, Margarito Sofia, to the [facility] DERMATOLOGY at [facility]. Please see a copy of my visit note below.    [facility] Dermatology Note     Dermatology Surgery Clinic  [facility]  Clinics and Surgery Center  [address] [place] [address]     Dermatology Problem List:  1. Hidradenitis suppurativa.  -started on infliximab, bactrim, and prednisone 08/29/19  2. Acne vulgaris  -Has tried Accutane and a number of antibiotics     Encounter Date:  October 17, 2019     Last visit: Aug 29, 2019     CC:  Chief Complaint   Patient presents with     Derm Problem     Jarrod is here for a HS follow up, states it improved with the prednisone, but new spots are appearing since being off of it.       Interval Hx (10/17/19)  Pt notes after his infusions and prednisone he is feeling much better. He has now had 2 infusions, now 4 weeks out. Infliximab is scheduled every 4 weeks. No side effects. No issues with infections. Has felt more tired. He is extremely fatigued. Falling asleep at desk. Gained 5-10 lbs on prednisone. Has been off for 2 weeks.  He was on it for 2 months.  No fevers. No night sweats.     Initial HPI (8/29/19):  Mr. Margarito Sofia is a 33 year old male who presents in consultation for Hidradenitis suppurativa. The patient reports that his HS started about a year ago. He states that the last doctor he saw when he moved here told him he had HS. He notes that his HS symptoms have worsened in the last couple of months.     Denies diarrhea and constipation. Over the last year, he states that he has gained about 15-20 lbs. He reports that he is quite stressed but not depressed. On a pain scale of 1-10, the patient reports his pain a 5. However, he has a stabbing sensation on the extremities and  "abdomen. He denies that he smokes.      Regarding prior medications, he has been on antibiotics for acne almost all his life. He feels that antibiotics help to caron symptoms but there was no significant help. He has tried Bactrim with some relief. The patient admits to doing 2 cycles of Accutane for acne as well, but the acne would return for a year or two. He did Accutane at [age], so it's been about 11 years. He shares that he did have a \"bad eye injury\" about 2 years ago that he took antifungals for almost a year with no issues. 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  He has also tried clindamycin and rifampin for 2 months now, but it is not helping. He shares that his mother has taken Humira due to inflammatory bowel with side effects.   He states that he has tried Prednisone for 1.5 weeks in the past and it helped with pain and inflammation.     Within the last year, he shares that he has moved out here from [place] for work. Otherwise, there have been no other changes that he can think of. The patient shares that he is a .  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     On exam, the patient was noted to have a mole on the R forehead. He states that it has been there awhile.      Past Medical History:   Acne s/p isotretinoin  Traumatic fungal infection of eye s/p 1 year of antifungals completed 1 year ago  Heart murmur per patient      Social History:  Patient reports that he has never smoked. He has never used smokeless tobacco.     Family History:   Mother with inflammatory bowel disease     Medications:  Current Outpatient Prescriptions          Current Outpatient Medications   Medication Sig Dispense Refill     Doxycycline Hyclate 50 MG TABS           fluorometholone (FML LIQUIFILM) 0.1 % ophthalmic suspension SHAKE LQ AND INT 1 GTT IN OU D   5     ketoconazole (NIZORAL) 2 % external shampoo Apply to the scalp and " upper body as directed twice per week.         rifampin (RIFADIN) 300 MG capsule 1 tab po BID         chlorhexidine (HIBICLENS) 4 % liquid Wash boil-prone areas three times weekly while in shower                No Known Allergies     Review of Systems:  -As per HPI  -Constitutional: Otherwise feeling well today, in usual state of health.  -Skin: As above in HPI. No additional skin concerns.     Physical exam  GEN: This is a well developed, well-nourished male in no acute distress, in a pleasant mood.    SKIN: Full skin, which includes the head/face, both arms, chest, back, abdomen,both legs, genitalia and/or groin buttocks, digits and/or nails, was examined.  Scattered and closed comedones and pustules around the mouth and nose    3 pink inflammatory papules in rt axilla, one slightly ulcerated. The rest are all healing pink papules scattered  Fairly diffusely in axilla.   6 pink papule in left axilla with non-ninflamed cord.  Diffuse pink to purple papules almost coalescing into a diffuse plaque on suprapubic region, upper thighs and inguinal folds b/l. No nodules or tunnels.   Scattered purple inflammatory papules.   Back of neck clear       Previous exam:  Dark brown 3 mm macule on R forehead inglycan appearance with dots and a hint of reticular network  Draining tunneling plaque on posterior neck  Numerous acneiform throughout mouth  Scattered acneiform papules through the dorsal neck and chest  2% BSA draining plaque R axilla; 3+ erythema, 1+ induration, 3+ drainage  1 inflammatory in the L axilla and a few inflammatory papules  -Linear erosions in supergluteal cleft  -Ulceration along the superperirectal area, 3mm  -Scattered inflammatory acneiform papules on the buttock  -5 cm abscess on the L abdomen  -4% BSA violaceous plaques with 1 cm ulceration in the suprapubic region extending into the lower abdominal fold  -Hints of linear ulcerations in the L inguinal fold  -Violaceous nodules coalescing into  inflammatory plaque  -1% BSA in R and L inguinal fold  -Inflammatory nodules throughout the scrotum  -No other lesions of concern on areas examined.         Impression/Plan:  1. Hidradenitis suppurativa.    The patient has been on infliximab q4 weeks with 40mg methlypred for about 8 weeks. Continue. No side effects.    Discontinue bactrim    Start methotrexate 15mg weekly and daily methotrexate. Discuss risks.    Labs in 2 and 4 weeks    F/u in 4 weeks    2. Fatigue  - ? from tapering off prednisone.   - Checking all labs in 2 weeks and will see how he is feeling, especially with start of methotrexate. No concern for infection.     Follow up in about  4 weeks     Dennis Denny MD, FAAD    Departments of Internal Medicine and Dermatology  [facility]  [phone]              Pictures were placed in Pt's chart today for future reference.      Again, thank you for allowing me to participate in the care of your patient.      Sincerely,    Dennis Denny MD

## 2019-10-17 NOTE — NURSING NOTE
Dermatology Rooming Note    Margarito Sofia's goals for this visit include:   Chief Complaint   Patient presents with     Derm Problem     Jarrod is here for a HS follow up, states it improved with the prednisone, but new spots are appearing since being off of it.        Deandra Esteves, FRANCISN

## 2019-10-17 NOTE — LETTER
Date:November 8, 2019      Patient was self referred, no letter generated. Do not send.        [facility] Physicians Health Information

## 2019-10-22 ENCOUNTER — HOME INFUSION (PRE-WILLOW HOME INFUSION) (OUTPATIENT)
Dept: PHARMACY | Facility: CLINIC | Age: 34
End: 2019-10-22

## 2019-10-22 NOTE — PROGRESS NOTES
Therapy: Remicade  Insurance: [employer]   Ded: $4,000   Met: $4,000    Co-Insurance: 30%  Max Out of Pocket: $6,850  Met: $6,850    [facility] Dermatology Clinic in Reference to Clinic Referral made on 10/22/2019 to check Remicade Coverage.     Please contact Intake with any questions, [phone] or In Basket pool, [facility] ([id number]).

## 2019-10-23 ENCOUNTER — HOME INFUSION (PRE-WILLOW HOME INFUSION) (OUTPATIENT)
Dept: PHARMACY | Facility: CLINIC | Age: 34
End: 2019-10-23

## 2019-10-24 NOTE — PROGRESS NOTES
This is a recent snapshot of the patient's [facility] medical record.  For current drug dose and complete information and questions, call [phone] or In Basket pool, [facility] ([id number])  CSN Number:  [id number]

## 2019-10-28 ENCOUNTER — HOME INFUSION (PRE-WILLOW HOME INFUSION) (OUTPATIENT)
Dept: PHARMACY | Facility: CLINIC | Age: 34
End: 2019-10-28

## 2019-10-30 ENCOUNTER — MEDICAL CORRESPONDENCE (OUTPATIENT)
Dept: HEALTH INFORMATION MANAGEMENT | Facility: CLINIC | Age: 34
End: 2019-10-30

## 2019-10-30 ENCOUNTER — HOME INFUSION (PRE-WILLOW HOME INFUSION) (OUTPATIENT)
Dept: PHARMACY | Facility: CLINIC | Age: 34
End: 2019-10-30

## 2019-10-30 LAB
ALBUMIN SERPL-MCNC: 3.5 G/DL (ref 3.4–5)
ALP SERPL-CCNC: 85 U/L (ref 40–150)
ALT SERPL W P-5'-P-CCNC: 52 U/L (ref 0–70)
ANION GAP SERPL CALCULATED.3IONS-SCNC: 7 MMOL/L (ref 3–14)
AST SERPL W P-5'-P-CCNC: 30 U/L (ref 0–45)
BASOPHILS # BLD AUTO: 0.1 10E9/L (ref 0–0.2)
BASOPHILS NFR BLD AUTO: 0.4 %
BILIRUB SERPL-MCNC: 0.3 MG/DL (ref 0.2–1.3)
BUN SERPL-MCNC: 10 MG/DL (ref 7–30)
CALCIUM SERPL-MCNC: 8.6 MG/DL (ref 8.5–10.1)
CHLORIDE SERPL-SCNC: 106 MMOL/L (ref 94–109)
CO2 SERPL-SCNC: 25 MMOL/L (ref 20–32)
CREAT SERPL-MCNC: 0.72 MG/DL (ref 0.66–1.25)
DIFFERENTIAL METHOD BLD: ABNORMAL
EOSINOPHIL # BLD AUTO: 0.4 10E9/L (ref 0–0.7)
EOSINOPHIL NFR BLD AUTO: 3.3 %
ERYTHROCYTE [DISTWIDTH] IN BLOOD BY AUTOMATED COUNT: 19 % (ref 10–15)
GFR SERPL CREATININE-BSD FRML MDRD: >90 ML/MIN/{1.73_M2}
GLUCOSE SERPL-MCNC: 85 MG/DL (ref 70–99)
HCT VFR BLD AUTO: 45.5 % (ref 40–53)
HGB BLD-MCNC: 14.9 G/DL (ref 13.3–17.7)
IMM GRANULOCYTES # BLD: 0.1 10E9/L (ref 0–0.4)
IMM GRANULOCYTES NFR BLD: 0.7 %
LYMPHOCYTES # BLD AUTO: 3.5 10E9/L (ref 0.8–5.3)
LYMPHOCYTES NFR BLD AUTO: 27.8 %
MCH RBC QN AUTO: 27.9 PG (ref 26.5–33)
MCHC RBC AUTO-ENTMCNC: 32.7 G/DL (ref 31.5–36.5)
MCV RBC AUTO: 85 FL (ref 78–100)
MONOCYTES # BLD AUTO: 1.1 10E9/L (ref 0–1.3)
MONOCYTES NFR BLD AUTO: 8.8 %
NEUTROPHILS # BLD AUTO: 7.4 10E9/L (ref 1.6–8.3)
NEUTROPHILS NFR BLD AUTO: 59 %
NRBC # BLD AUTO: 0 10*3/UL
NRBC BLD AUTO-RTO: 0 /100
PLATELET # BLD AUTO: 386 10E9/L (ref 150–450)
POTASSIUM SERPL-SCNC: 3.7 MMOL/L (ref 3.4–5.3)
PROT SERPL-MCNC: 7.4 G/DL (ref 6.8–8.8)
RBC # BLD AUTO: 5.35 10E12/L (ref 4.4–5.9)
SODIUM SERPL-SCNC: 138 MMOL/L (ref 133–144)
WBC # BLD AUTO: 12.6 10E9/L (ref 4–11)

## 2019-10-30 PROCEDURE — 85025 COMPLETE CBC W/AUTO DIFF WBC: CPT | Performed by: DERMATOLOGY

## 2019-10-30 PROCEDURE — 80053 COMPREHEN METABOLIC PANEL: CPT | Performed by: DERMATOLOGY

## 2019-10-30 NOTE — PHARMACY
Skilled Nurse visit in the [facility] Infusion Suite to administer Remicade 700mg infusion.  No recent elevated temperature, fever, chills, productive cough, coughing for 3 weeks or longer or hemoptysis, abnormal vital signs, night sweats, chest pain. No  decrease in your appetite, unexplained weight loss or fatigue.  No other new onset medical symptoms.  Current weight 319 lbs.  PIV placed in R ac, with 1 attempt.  Pre medicated with Solumedrol 40mg IVP. Labs drawn per MD order. Infusion completed without complication or reaction. Pt reports therapy is helping in managing symptoms related to therapy.    Yakelin Quinn RN BSN ELIZABETH  [facility]  [phone]  [email or web address]

## 2019-10-31 NOTE — PROGRESS NOTES
This is a recent snapshot of the patient's [facility] medical record.  For current drug dose and complete information and questions, call [phone] or In Basket pool, [facility] ([id number])  CSN Number:  [id number]

## 2019-11-14 ENCOUNTER — OFFICE VISIT (OUTPATIENT)
Dept: DERMATOLOGY | Facility: CLINIC | Age: 34
End: 2019-11-14
Payer: COMMERCIAL

## 2019-11-14 ENCOUNTER — APPOINTMENT (OUTPATIENT)
Dept: LAB | Facility: CLINIC | Age: 34
End: 2019-11-14
Payer: COMMERCIAL

## 2019-11-14 DIAGNOSIS — L73.2 HIDRADENITIS SUPPURATIVA: Primary | ICD-10-CM

## 2019-11-14 LAB
ALBUMIN SERPL-MCNC: 3.8 G/DL (ref 3.4–5)
ALP SERPL-CCNC: 85 U/L (ref 40–150)
ALT SERPL W P-5'-P-CCNC: 67 U/L (ref 0–70)
ANION GAP SERPL CALCULATED.3IONS-SCNC: 4 MMOL/L (ref 3–14)
AST SERPL W P-5'-P-CCNC: 35 U/L (ref 0–45)
BASOPHILS # BLD AUTO: 0.1 10E9/L (ref 0–0.2)
BASOPHILS NFR BLD AUTO: 0.6 %
BILIRUB SERPL-MCNC: 0.4 MG/DL (ref 0.2–1.3)
BUN SERPL-MCNC: 10 MG/DL (ref 7–30)
CALCIUM SERPL-MCNC: 8.9 MG/DL (ref 8.5–10.1)
CHLORIDE SERPL-SCNC: 105 MMOL/L (ref 94–109)
CO2 SERPL-SCNC: 28 MMOL/L (ref 20–32)
CREAT SERPL-MCNC: 0.81 MG/DL (ref 0.66–1.25)
DIFFERENTIAL METHOD BLD: ABNORMAL
EOSINOPHIL # BLD AUTO: 0.4 10E9/L (ref 0–0.7)
EOSINOPHIL NFR BLD AUTO: 3.1 %
ERYTHROCYTE [DISTWIDTH] IN BLOOD BY AUTOMATED COUNT: 18.7 % (ref 10–15)
GFR SERPL CREATININE-BSD FRML MDRD: >90 ML/MIN/{1.73_M2}
GLUCOSE SERPL-MCNC: 103 MG/DL (ref 70–99)
HCT VFR BLD AUTO: 47.9 % (ref 40–53)
HGB BLD-MCNC: 15.7 G/DL (ref 13.3–17.7)
IMM GRANULOCYTES # BLD: 0.1 10E9/L (ref 0–0.4)
IMM GRANULOCYTES NFR BLD: 0.8 %
LYMPHOCYTES # BLD AUTO: 3.8 10E9/L (ref 0.8–5.3)
LYMPHOCYTES NFR BLD AUTO: 29 %
MCH RBC QN AUTO: 28.2 PG (ref 26.5–33)
MCHC RBC AUTO-ENTMCNC: 32.8 G/DL (ref 31.5–36.5)
MCV RBC AUTO: 86 FL (ref 78–100)
MONOCYTES # BLD AUTO: 1.1 10E9/L (ref 0–1.3)
MONOCYTES NFR BLD AUTO: 8.1 %
NEUTROPHILS # BLD AUTO: 7.6 10E9/L (ref 1.6–8.3)
NEUTROPHILS NFR BLD AUTO: 58.4 %
NRBC # BLD AUTO: 0 10*3/UL
NRBC BLD AUTO-RTO: 0 /100
PLATELET # BLD AUTO: 385 10E9/L (ref 150–450)
POTASSIUM SERPL-SCNC: 3.9 MMOL/L (ref 3.4–5.3)
PROT SERPL-MCNC: 7.9 G/DL (ref 6.8–8.8)
RBC # BLD AUTO: 5.57 10E12/L (ref 4.4–5.9)
SODIUM SERPL-SCNC: 137 MMOL/L (ref 133–144)
WBC # BLD AUTO: 13.1 10E9/L (ref 4–11)

## 2019-11-14 ASSESSMENT — PAIN SCALES - GENERAL: PAINLEVEL: NO PAIN (0)

## 2019-11-14 NOTE — PROGRESS NOTES
[facility] Dermatology Note     Dermatology Clinic  [facility]  Clinics and Surgery Center  [address] [place] [id number]     Dermatology Problem List:  1. Hidradenitis suppurativa.  -current recommended tx: infliximab [id number] with 40mg methylpred, methotrexate 15mg   -past tx: has been on ABx for acne (incl Bactrim), clindamycin, rifampin, prednisone, bactrim  2. Acne vulgaris  -Has tried Accutane and a number of antibiotics     Encounter Date: October 17, 2019     CC:  Chief Complaint   Patient presents with     Derm Problem     Jarrod is here for a HS follow up, states areas are still healing.        History of Present Illness:  Mr. Margarito Sofia is a 33 year old male who presents as a followup for Hidradenitis suppurativa. The patient was last seen in clinic on 10/17/19 for HS followup. At the time he noted he was feeling much better with the infliximab infusions.  He reports being fatigued and having gained 5-10lbs on prednisone. He was to continue with infliximab [id number] with 40mg methylprednisolone. Noted no side effects. He was to stop bactrim and start methotrexate at 15mg weekly and daily. He was to have labs in 2-4 weeks.    Today the patient reports that he has improved. Reports that there are no issues with the groin area. No side effects from the methotrexate. No prior Hx of injections. Denies any flares in the interim. The patient is overall feeling well. No other skin concerns at this time.      Past Medical History:   Acne s/p isotretinoin  Traumatic fungal infection of eye s/p 1 year of antifungals completed 1 year ago  Heart murmur per patient      Social History:  Patient reports that he has never smoked. He has never used smokeless tobacco. Works as a .     Family History:   Mother with inflammatory bowel disease     Medications:  Current Outpatient Prescriptions          Current Outpatient Medications   Medication Sig Dispense  Refill     Doxycycline Hyclate 50 MG TABS           fluorometholone (FML LIQUIFILM) 0.1 % ophthalmic suspension SHAKE LQ AND INT 1 GTT IN OU D   5     ketoconazole (NIZORAL) 2 % external shampoo Apply to the scalp and upper body as directed twice per week.         rifampin (RIFADIN) 300 MG capsule 1 tab po BID         chlorhexidine (HIBICLENS) 4 % liquid Wash boil-prone areas three times weekly while in shower                No Known Allergies     Review of Systems:  -As per HPI  -Constitutional: Otherwise feeling well today, in usual state of health.  -Skin: As above in HPI. No additional skin concerns.     Physical exam  GEN: This is a well developed, well-nourished male in no acute distress, in a pleasant mood.    SKIN: Focused exam of the bilateral axillas, groin, and upper thighs bilaterally were performed.  -10x nodules on the R axilla  -1x nodule L axilla with scarred cord, erythema at 1 edge  -1x nodule on the R upper thigh with scattered healing papules  -No other lesions of concern on areas examined.         Impression/Plan:  1. Hidradenitis suppurativa.    The patient is to continue with infliximab q4 weeks with 40mg methlypred. No side effects. Doing extremely well.     CBC and CMP today    He is not sure whether he started flaring prior to his next injection. If no issues over the next 3 months with flaring prior to injection, he could consider streching out to [id number] and then out to [id number]. I typically cannot get HS patient out this far.    Continue methotrexate 15mg weekly and daily folic acid.        Follow up in 3 months with Madeline Denny MD, FAAD    Departments of Internal Medicine and Dermatology  [facility]  [phone]

## 2019-11-14 NOTE — NURSING NOTE
Dermatology Rooming Note    Margarito Sofia's goals for this visit include:   Chief Complaint   Patient presents with     Derm Problem     Jarrod is here for a HS follow up, states areas are still healing.        Deandra Esteves LPN

## 2019-11-14 NOTE — LETTER
Date:November 20, 2019      Patient was self referred, no letter generated. Do not send.        [facility] Physicians Health Information

## 2019-11-14 NOTE — LETTER
11/14/2019       RE: Margarito Sofia  [address] [facility] [address]     Dear Colleague,    Thank you for referring your patient, Margarito Sofia, to the [facility] DERMATOLOGY at [facility]. Please see a copy of my visit note below.    [facility] Dermatology Note     Dermatology Clinic  [facility]  Clinics and Surgery Center  [address] [place] [address]     Dermatology Problem List:  1. Hidradenitis suppurativa.  -current recommended tx: infliximab [id number] with 40mg methylpred, methotrexate 15mg   -past tx: has been on ABx for acne (incl Bactrim), clindamycin, rifampin, prednisone, bactrim  2. Acne vulgaris  -Has tried Accutane and a number of antibiotics     Encounter Date: October 17, 2019     CC:  Chief Complaint   Patient presents with     Derm Problem     Jarrod is here for a HS follow up, states areas are still healing.        History of Present Illness:  Mr. Margarito Sofia is a 33 year old male who presents as a followup for Hidradenitis suppurativa.  The patient was last seen in clinic on 10/17/19 for HS followup. At the time he noted he was feeling much better with the infliximab infusions.  He reports being fatigued and having gained 5-10lbs on prednisone. He was to continue with infliximab [id number] with 40mg methylprednisolone. Noted no side effects. He was to stop bactrim and start methotrexate at 15mg weekly and daily. He was to have labs in 2-4 weeks.    Today the patient reports that he has improved. Reports that there are no issues with the groin area. No side effects from the methotrexate. No prior Hx of injections. Denies any flares in the interim. The patient is overall feeling well. No other skin concerns at this time.      Past Medical History:   Acne s/p isotretinoin  Traumatic fungal infection of eye s/p 1 year of antifungals completed 1 year ago  Heart murmur per patient      Social  History:  Patient reports that he has never smoked. He has never used smokeless tobacco. Works as a .     Family History:   Mother with inflammatory bowel disease     Medications:  Current Outpatient Prescriptions          Current Outpatient Medications   Medication Sig Dispense Refill     Doxycycline Hyclate 50 MG TABS           fluorometholone (FML LIQUIFILM) 0.1 % ophthalmic suspension SHAKE LQ AND INT 1 GTT IN OU D   5     ketoconazole (NIZORAL) 2 % external shampoo Apply to the scalp and upper body as directed twice per week.         rifampin (RIFADIN) 300 MG capsule 1 tab po BID         chlorhexidine (HIBICLENS) 4 % liquid Wash boil-prone areas three times weekly while in shower                No Known Allergies     Review of Systems:  -As per HPI  -Constitutional: Otherwise feeling well today, in usual state of health.  -Skin: As above in HPI. No additional skin concerns.     Physical exam  GEN: This is a well developed, well-nourished male in no acute distress, in a pleasant mood.    SKIN: F ocused exam of the bilateral axillas, groin, and upper thighs bilaterally were performed.  -10x nodules on the R axilla  -1x nodule L axilla with scarred cord, erythema at 1 edge  -1x nodule on the R upper thigh with scattered healing papules  -No other lesions of concern on areas examined.         Impression/Plan:  1. Hidradenitis suppurativa.    The patient is to continue with infliximab q4 weeks with 40mg methlypred. No side effects. Doing extremely well.     CBC and CMP today    He is not sure whether he started flaring prior to his next injection. If no issues over the next 3 months with flaring prior to injection, he could consider streching out to [id number] and then out to [id number]. I typically cannot get HS patient out this far.    Continue methotrexate 15mg weekly and daily folic acid.        Follow up in 3 months with Madeline Peoples     Provider Disclosure:   The documentation recorded by the vanna  accurately reflects the services I personally performed and the decisions made by me.    Dennis Denny MD, FAAD    Departments of Internal Medicine and Dermatology  [facility]  [phone]        Again, thank you for allowing me to participate in the care of your patient.      Sincerely,    Dennis Denny MD

## 2019-11-26 ENCOUNTER — HOME INFUSION (PRE-WILLOW HOME INFUSION) (OUTPATIENT)
Dept: PHARMACY | Facility: CLINIC | Age: 34
End: 2019-11-26

## 2019-11-27 ENCOUNTER — HOME INFUSION (PRE-WILLOW HOME INFUSION) (OUTPATIENT)
Dept: PHARMACY | Facility: CLINIC | Age: 34
End: 2019-11-27

## 2019-11-27 NOTE — PROGRESS NOTES
This is a recent snapshot of the patient's [facility] medical record.  For current drug dose and complete information and questions, call [phone] or In Basket pool, fv home infusion ([id number])  CSN Number:  [id number]

## 2019-11-27 NOTE — PHARMACY
Skilled Nurse visit in the [facility] Infusion Suite to administer Remicade 700 mg IV every 4 weeks.  No recent elevated temperature, fever, chills, productive cough, coughing for 3 weeks or longer or hemoptysis, abnormal vital signs, night sweats, chest pain. No  decrease in your appetite, unexplained weight loss or fatigue.  No other new onset medical symptoms.  Current weight 320 lbs.  PIV placed right ac, x1 attempt.  Pre medicated with Methylprednisolone 40 mg IV push over 3-5 minutes, 30 minutes prior to infusion. Infusion completed without complication or reaction. Pt reports therapy is effective in managing symptoms related to therapy.    Stephan DELEON RN CRNI  [phone]  [email or web address]

## 2019-11-29 NOTE — PROGRESS NOTES
This is a recent snapshot of the patient's [facility] medical record.  For current drug dose and complete information and questions, call [phone] or In Basket pool, fv home infusion ([id number])  CSN Number:  [id number]

## 2019-12-04 ENCOUNTER — HOME INFUSION (PRE-WILLOW HOME INFUSION) (OUTPATIENT)
Dept: PHARMACY | Facility: CLINIC | Age: 34
End: 2019-12-04

## 2019-12-05 NOTE — PROGRESS NOTES
This is a recent snapshot of the patient's [facility] medical record.  For current drug dose and complete information and questions, call [phone] or In Basket pool, fv home infusion ([id number])  CSN Number:  [id number]

## 2019-12-06 ENCOUNTER — HOME INFUSION (PRE-WILLOW HOME INFUSION) (OUTPATIENT)
Dept: PHARMACY | Facility: CLINIC | Age: 34
End: 2019-12-06

## 2019-12-06 ENCOUNTER — MEDICAL CORRESPONDENCE (OUTPATIENT)
Dept: HEALTH INFORMATION MANAGEMENT | Facility: CLINIC | Age: 34
End: 2019-12-06

## 2019-12-09 NOTE — PROGRESS NOTES
This is a recent snapshot of the patient's [facility] medical record.  For current drug dose and complete information and questions, call [phone] or In Basket pool, fv home infusion ([id number])  CSN Number:  [id number]

## 2019-12-27 ENCOUNTER — HOME INFUSION (PRE-WILLOW HOME INFUSION) (OUTPATIENT)
Dept: PHARMACY | Facility: CLINIC | Age: 34
End: 2019-12-27

## 2019-12-30 ENCOUNTER — HOME INFUSION (PRE-WILLOW HOME INFUSION) (OUTPATIENT)
Dept: PHARMACY | Facility: CLINIC | Age: 34
End: 2019-12-30

## 2019-12-30 NOTE — PROGRESS NOTES
Skilled Nurse visit in the  patient [facility] Infusion Suite to administer Remicade 700mg IV over one hour.  No recent elevated temperature, fever, chills, productive cough, coughing for 3 weeks or longer or hemoptysis, abnormal vital signs, night sweats, chest pain. No  decrease in appetite, unexplained weight loss or fatigue.  No other new onset medical symptoms.  Current weight 327 pounds.  PIV placed left antecubital x1 attempt.  Pre medicated with Solumedrol 40mg IV push. Infusion completed without complication or reaction. Pt reports therapy is helping in managing symptoms related to therapy.    ELIZABETH Craig  [phone]   [email or web address]

## 2019-12-31 NOTE — PROGRESS NOTES
This is a recent snapshot of the patient's [facility] medical record.  For current drug dose and complete information and questions, call [phone] or In Basket pool, fv home infusion ([id number])  CSN Number:  [id number]

## 2020-01-24 ENCOUNTER — HOME INFUSION (PRE-WILLOW HOME INFUSION) (OUTPATIENT)
Dept: PHARMACY | Facility: CLINIC | Age: 35
End: 2020-01-24

## 2020-01-27 ENCOUNTER — HOME INFUSION (PRE-WILLOW HOME INFUSION) (OUTPATIENT)
Dept: PHARMACY | Facility: CLINIC | Age: 35
End: 2020-01-27

## 2020-01-27 NOTE — PROGRESS NOTES
This is a recent snapshot of the patient's [facility] medical record.  For current drug dose and complete information and questions, call [phone] or In Basket pool, fv home infusion ([id number])  CSN Number:  [id number]

## 2020-01-28 NOTE — PROGRESS NOTES
Skilled Nurse visit in the [facility] Infusion Suite to administer Remicade 700 mg/250 mL IV over 2 hours.  No recent elevated temperature, fever, chills, productive cough, coughing for 3 weeks or longer or hemoptysis, abnormal vital signs, night sweats, chest pain. No  decrease in your appetite, unexplained weight loss or fatigue.  No other new onset medical symptoms.  Current weight 332.8 lbs .  PIV placed in R AC, 2 attempts.    Pre medicated with Methylprednisolone 40 mg IVP over 3-5 minutes.  Infusion completed without complication or reaction. Pt reports therapy is effective in managing symptoms related to therapy.    CONCEPCION LevinN, RN  [phone]  [email or web address]

## 2020-01-28 NOTE — PROGRESS NOTES
This is a recent snapshot of the patient's [facility] medical record.  For current drug dose and complete information and questions, call [phone] or In [facility] pool, fv home infusion ([id number])  CSN Number:  [id number]

## 2020-02-06 DIAGNOSIS — Z79.899 ENCOUNTER FOR LONG-TERM (CURRENT) USE OF HIGH-RISK MEDICATION: ICD-10-CM

## 2020-02-06 DIAGNOSIS — L73.2 HIDRADENITIS SUPPURATIVA: ICD-10-CM

## 2020-02-09 NOTE — TELEPHONE ENCOUNTER
methotrexate sodium 2.5 MG TABS  Last Written Prescription Date:  10/17/19  Last Fill Quantity: 24,   # refills: 3  Last Office Visit: 11/14/19  Future Office visit: 2/13/20      CBC RESULTS:   Recent Labs   Lab Test 11/14/19  1316   WBC 13.1*   RBC 5.57   HGB 15.7   HCT 47.9   MCV 86   MCH 28.2   MCHC 32.8   RDW 18.7*          Creatinine   Date Value Ref Range Status   11/14/2019 0.81 0.66 - 1.25 mg/dL Final   ]    Liver Function Studies -   Recent Labs   Lab Test 11/14/19  1316   PROTTOTAL 7.9   ALBUMIN 3.8   BILITOTAL 0.4   ALKPHOS 85   AST 35   ALT 67       Routing refill request to provider for review/approval because: not on protocol.   labs q 12 wks ?  Due now.

## 2020-02-09 NOTE — TELEPHONE ENCOUNTER
Pt chart reviewed and medication refill is not appropriate as the pt is overdue for labs as they were last done in November. He has an appt on 2/13 at which time he can have his labs done which were ordered today (CBC, CMP (Quant not due)) and obtain refills. VM left for pt to get labs done prior to his appt so he may obtain refills.     Joanie Quinones MD - PGY-4  Dermatology Resident on Call  [facility]

## 2020-02-13 ENCOUNTER — OFFICE VISIT (OUTPATIENT)
Dept: DERMATOLOGY | Facility: CLINIC | Age: 35
End: 2020-02-13
Payer: COMMERCIAL

## 2020-02-13 DIAGNOSIS — L73.2 HIDRADENITIS SUPPURATIVA: Primary | ICD-10-CM

## 2020-02-13 DIAGNOSIS — L73.2 HIDRADENITIS SUPPURATIVA: ICD-10-CM

## 2020-02-13 DIAGNOSIS — Z79.899 ENCOUNTER FOR LONG-TERM (CURRENT) USE OF HIGH-RISK MEDICATION: ICD-10-CM

## 2020-02-13 LAB
ALBUMIN SERPL-MCNC: 3.8 G/DL (ref 3.4–5)
ALP SERPL-CCNC: 86 U/L (ref 40–150)
ALT SERPL W P-5'-P-CCNC: 98 U/L (ref 0–70)
ANION GAP SERPL CALCULATED.3IONS-SCNC: 4 MMOL/L (ref 3–14)
AST SERPL W P-5'-P-CCNC: 44 U/L (ref 0–45)
BILIRUB SERPL-MCNC: 0.4 MG/DL (ref 0.2–1.3)
BUN SERPL-MCNC: 10 MG/DL (ref 7–30)
CALCIUM SERPL-MCNC: 9.2 MG/DL (ref 8.5–10.1)
CHLORIDE SERPL-SCNC: 107 MMOL/L (ref 94–109)
CO2 SERPL-SCNC: 30 MMOL/L (ref 20–32)
CREAT SERPL-MCNC: 0.9 MG/DL (ref 0.66–1.25)
ERYTHROCYTE [DISTWIDTH] IN BLOOD BY AUTOMATED COUNT: 13.8 % (ref 10–15)
GFR SERPL CREATININE-BSD FRML MDRD: >90 ML/MIN/{1.73_M2}
GLUCOSE SERPL-MCNC: 99 MG/DL (ref 70–99)
HCT VFR BLD AUTO: 48.4 % (ref 40–53)
HGB BLD-MCNC: 15.9 G/DL (ref 13.3–17.7)
MCH RBC QN AUTO: 31 PG (ref 26.5–33)
MCHC RBC AUTO-ENTMCNC: 32.9 G/DL (ref 31.5–36.5)
MCV RBC AUTO: 94 FL (ref 78–100)
PLATELET # BLD AUTO: 332 10E9/L (ref 150–450)
POTASSIUM SERPL-SCNC: 4.1 MMOL/L (ref 3.4–5.3)
PROT SERPL-MCNC: 7.7 G/DL (ref 6.8–8.8)
RBC # BLD AUTO: 5.13 10E12/L (ref 4.4–5.9)
SODIUM SERPL-SCNC: 141 MMOL/L (ref 133–144)
WBC # BLD AUTO: 11.4 10E9/L (ref 4–11)

## 2020-02-13 RX ORDER — METHOTREXATE 2.5 MG/1
15 TABLET ORAL WEEKLY
Qty: 24 TABLET | Refills: 3 | Status: SHIPPED | OUTPATIENT
Start: 2020-02-13 | End: 2020-03-16

## 2020-02-13 RX ORDER — FOLIC ACID 1 MG/1
1 TABLET ORAL DAILY
Qty: 90 TABLET | Refills: 3 | Status: SHIPPED | OUTPATIENT
Start: 2020-02-13 | End: 2020-08-06

## 2020-02-13 ASSESSMENT — PAIN SCALES - GENERAL: PAINLEVEL: NO PAIN (0)

## 2020-02-13 NOTE — PROGRESS NOTES
"[facility] Dermatology Note      Dermatology Problem List:  1. Hidradenitis suppurativa.  -current recommended tx: infliximab [id number] with 40mg methylpred, methotrexate 15mg   -past tx: has been on ABx for acne (incl Bactrim), clindamycin, rifampin, prednisone, bactrim  2. Acne vulgaris  -Has tried Accutane and a number of antibiotics    Encounter Date: Feb 13, 2020    CC:  Chief Complaint   Patient presents with     Derm Problem     3 mo follow up, no flaring         History of Present Illness:  Mr. Margarito Sofia is a 34 year old male who presents as a follow-up for HS. The patient was last seen on 11/14/2019 by Dr. Denny when infliximab q4 weeks with 40 mg methylpred was continued and methotrexate 15 mg weekly with daily folic acid was continued for HS. At today's visit, the patient notes he has been doing well on his current treatment plan. He states that \"everything is healing\" and that he gets a flare up very rarely. The patient denies using any mediation other than those previously prescribed for HS. He is not using any topicals at this time. However, he states he has noticed headaches in the first week after his infusion. The patient notes that these headaches are treated successful with advil. The patient denies nausea, upset stomach, diarrhea, chest pain, hearing problems and vision problems. However, he notes has gained approximately 10 pounds and does occasionally experience a tingling sensation on his face. He has stopped the prednisone he says. The patient states that everyone in his family on his mom's side has an auto-immune disease. The patient denies additional lesions or areas of concern. The patient denies painful, itching, tingling or bleeding lesions unless otherwise noted.    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  No past surgical history on file.    Social History:   reports that he has never smoked. He has never used smokeless tobacco. Works as a .    Family History:  Mother with inflammatory bowel disease    Medications:  Current Outpatient Medications   Medication Sig Dispense Refill     chlorhexidine (HIBICLENS) 4 % liquid Wash boil-prone areas three times weekly while in shower       fluorometholone (FML LIQUIFILM) 0.1 % ophthalmic suspension SHAKE LQ AND INT 1 GTT IN OU D  5     folic acid (FOLVITE) 1 MG tablet Take 1 tablet (1 mg) by mouth daily 90 tablet 3     ketoconazole (NIZORAL) 2 % external shampoo Apply to the scalp and upper body as directed twice per week.       methotrexate sodium 2.5 MG TABS Take 6 tablets (15 mg) by mouth once a week 24 tablet 3       No Known Allergies    Review of Systems:  Constitutional: The patient denies fatigue, fevers, chills, unintended weight loss, and night sweats.  Skin: As above in HPI. No additional skin concerns.  Eyes: no vision change  Ears, Nose, Mouth, Throat: no hearing change  Cardiovascular: no chest pain  Respiratory: no dyspnea, cough, shortness of breath or wheezing   GI: no nausea, vomiting, diarrhea or constipation, no abdominal pain  Musculoskeletal: no joint or muscle pain or swelling   Neuro: no numbness or tingling, no tremor, no dizziness, positive for headache following infusions, but resolved with NSAIDs  Endo:  no temperature intolerance     Physical exam:  Vitals: There were no vitals taken for this visit.  GEN: This is a well developed, well-nourished male in no acute distress, in a pleasant mood.    SKIN: Focused examination of the face, neck, chest, bilateral axilla, lower abdomen was performed.  -Scattered healing papules on the right axilla  -No active papules in either axilla  -Some areas of induration on the anterior part of the right axilla  -Left axilla relatively  clear, only x1 palpable lesion in the mid left axilla  -x1 actively draining inflammatory lesion on the right side of the lower abdomen, otherwise several scattered papules which are all improving  -No other lesions of concern on areas examined.       Impression/Plan:  1. Hidradenitis suppurativa. Stable and improving.  - Per Dr. Denny, the patient has been approved for rapid 1 hour infliximab infusions  - The patient is to continue with infliximab infusions, but will increase to q5 weeks with 40mg methlypred. Headaches noticed as a side effect with infusions but treatable with advil. Will space out treatment to q6 weeks if patient continues to do well.   - CBC and CMP obtained today, reviewed and found wnl  - Continue methotrexate 15mg weekly and daily folic acid. Both refilled today.  - Patient due for TB Quantiferon test in August of 2020.    CC Dr. Desai & Dr. Denny on close of this encounter.  Follow-up in 3 months, earlier for new or changing lesions.       Staff Involved:  Staff/Scribe    Scribe Disclosure:  I, Phillip Herrera, am serving as a scribe to document services personally performed by Madeline Peoples PA-C, based on data collection and the provider's statements to me.     Provider Disclosure:   The documentation recorded by the scribe accurately reflects the services I personally performed and the decisions made by me.    All risks, benefits and alternatives were discussed with patient.  Patient is in agreement and understands the assessment and plan.  All questions were answered.    Madeline Peoples PA-C, MPAS  [facility] [place]: Phone: [phone], Fax: [phone]  [facility]: Phone: [phone],  Fax: [phone]

## 2020-02-13 NOTE — LETTER
"2/13/2020       RE: Margarito Sofia  [address] [address]     Dear Colleague,    Thank you for referring your patient, Margarito Sofia, to the [facility] DERMATOLOGY at [facility]. Please see a copy of my visit note below.    [facility] Dermatology Note      Dermatology Problem List:  1. Hidradenitis suppurativa.  -current recommended tx: infliximab q 5weeks with 40mg methylpred, methotrexate 15mg   -past tx: has been on ABx for acne (incl Bactrim), clindamycin, rifampin, prednisone, bactrim  2. Acne vulgaris  -Has tried Accutane and a number of antibiotics    Encounter Date: Feb 13, 2020    CC:  Chief Complaint   Patient presents with     Derm Problem     3 mo follow up, no flaring         History of Present Illness:  Mr. Margarito Sofia is a 34 year old male who presents as a follow-up for HS. The patient was last seen on 11/14/2019 by Dr. Denny when infliximab q4 weeks with 40 mg methylpred was continued and methotrexate 15 mg weekly with daily folic acid was continued for HS. At today's visit, the patient notes he has been doing well on his current treatment plan. He states that \"everything is healing\" and that he gets a flare up very rarely. The patient denies using any mediation other than those previously prescribed for HS. He is not using any topicals at this time. However, he states he has noticed headaches in the first week after his infusion. The patient notes that these headaches are treated successful with advil. The patient denies nausea, upset stomach, diarrhea, chest pain, hearing problems and vision problems. However, he notes has gained approximately 10 pounds and does occasionally experience a tingling sensation on his face. He has stopped the prednisone he says. The patient states that everyone in his family on his mom's side has an auto-immune disease. The patient denies additional lesions or areas of concern. " The patient denies painful, itching, tingling or bleeding lesions unless otherwise noted.    Past Medical History:   Acne s/p isotretinoin  Traumatic fungal infection of eye s/p 1 year of antifungals completed 1 year ago  Heart murmur per patient   Patient Active Problem List   Diagnosis     Hidradenitis suppurativa     No past medical history on file.  No past surgical history on file.    Social History:   reports that he has never smoked. He has never used smokeless tobacco. Works as a .    Family History:  Mother with inflammatory bowel disease    Medications:  Current Outpatient Medications   Medication Sig Dispense Refill     chlorhexidine (HIBICLENS) 4 % liquid Wash boil-prone areas three times weekly while in shower       fluorometholone (FML LIQUIFILM) 0.1 % ophthalmic suspension SHAKE LQ AND INT 1 GTT IN OU D  5     folic acid (FOLVITE) 1 MG tablet Take 1 tablet (1 mg) by mouth daily 90 tablet 3     ketoconazole (NIZORAL) 2 % external shampoo Apply to the scalp and upper body as directed twice per week.       methotrexate sodium 2.5 MG TABS Take 6 tablets (15 mg) by mouth once a week 24 tablet 3       No Known Allergies    Review of Systems:  Constitutional: The patient denies fatigue, fevers, chills, unintended weight loss, and night sweats.  Skin: As above in HPI. No additional skin concerns.  Eyes: no vision change  Ears, Nose, Mouth, Throat: no hearing change  Cardiovascular: no chest pain  Respiratory: no dyspnea, cough, shortness of breath or wheezing   GI: no nausea, vomiting, diarrhea or constipation, no abdominal pain  Musculoskeletal: no joint or muscle pain or swelling   Neuro: no numbness or tingling, no tremor, no dizziness, positive for headache following infusions, but resolved with NSAIDs  Endo:  no temperature intolerance     Physical exam:  Vitals: There were no vitals taken for this visit.  GEN: This is a well developed, well-nourished male in no acute distress, in a  pleasant mood.    SKIN: Focused examination of the face, neck, chest, bilateral axilla, lower abdomen was performed.  -Scattered healing papules on the right axilla  -No active papules in either axilla  -Some areas of induration on the anterior part of the right axilla  -Left axilla relatively clear, only x1 palpable lesion in the mid left axilla  -x1 actively draining inflammatory lesion on the right side of the lower abdomen, otherwise several scattered papules which are all improving  -No other lesions of concern on areas examined.       Impression/Plan:  1. Hidradenitis suppurativa. Stable and improving.  - Per Dr. Denny, the patient has been approved for rapid 1 hour infliximab infusions  - The patient is to continue with infliximab  infusions, but will increase to q5 weeks with 40mg methlypred. Headaches noticed as a side effect with infusions but treatable with advil. Will space out treatment to q6 weeks if patient continues to do well.   - CBC and CMP obtained today, reviewed and found wnl  - Continue methotrexate 15mg weekly and daily folic acid. Both refilled today.  - Patient due for TB Quantiferon test in August of 2020.    CC Dr. Desai & Dr. Denny on close of this encounter.  Follow-up in 3 months, earlier for new or changing lesions.       Staff Involved:  Staff/Scribe    Scribe Disclosure:  I, Phillip Herrera, am serving as a scribe to document services personally performed by Madeline Peoples PA-C, based on data collection and the provider's statements to me.     Provider Disclosure:   The documentation recorded by the scribe accurately reflects the services I personally performed and the decisions made by me.    All risks, benefits and alternatives were discussed with patient.  Patient is in agreement and understands the assessment and plan.  All questions were answered.    Madeline Peoples PA-C, MPAS  [facility] [place]: Phone:  [phone], Fax: [phone]  [facility]: Phone: [phone],  Fax: [phone]

## 2020-02-13 NOTE — NURSING NOTE
Chief Complaint   Patient presents with     Derm Problem     3 mo follow up, no flaring     Carley Arellano, EMT

## 2020-02-21 ENCOUNTER — HOME INFUSION (PRE-WILLOW HOME INFUSION) (OUTPATIENT)
Dept: PHARMACY | Facility: CLINIC | Age: 35
End: 2020-02-21

## 2020-02-24 ENCOUNTER — HOME INFUSION (PRE-WILLOW HOME INFUSION) (OUTPATIENT)
Dept: PHARMACY | Facility: CLINIC | Age: 35
End: 2020-02-24

## 2020-02-24 NOTE — PROGRESS NOTES
This is a recent snapshot of the patient's [facility] medical record.  For current drug dose and complete information and questions, call [phone] or In Basket pool, fv home infusion ([id number])  CSN Number:  [id number]

## 2020-02-25 ENCOUNTER — HOME INFUSION (PRE-WILLOW HOME INFUSION) (OUTPATIENT)
Dept: PHARMACY | Facility: CLINIC | Age: 35
End: 2020-02-25

## 2020-02-25 NOTE — PHARMACY
Skilled Nurse visit in the  [facility] Infusion Suite to administer Remicade 700 mg every 4 weeks IV.  No recent elevated temperature, fever, chills, productive cough, coughing for 3 weeks or longer or hemoptysis, abnormal vital signs, night sweats, chest pain. No  decrease in your appetite, unexplained weight loss or fatigue.  No other new onset medical symptoms.  Current weight 341.4 lbs.  PIV placed right ac x 1 attempt.  Pre medicated with Methylprednisolone 40 mg IV push for over 3 minutes. Infusion completed without complication or reaction. Pt reports therapy is effective in managing symptoms related to therapy.    Stephan DELEON RN CRNI  [phone]  [email or web address]

## 2020-02-25 NOTE — PROGRESS NOTES
This is a recent snapshot of the patient's [facility] medical record.  For current drug dose and complete information and questions, call [phone] or In Basket pool, fv home infusion ([id number])  CSN Number:  [id number]

## 2020-03-11 ENCOUNTER — HEALTH MAINTENANCE LETTER (OUTPATIENT)
Age: 35
End: 2020-03-11

## 2020-03-14 DIAGNOSIS — Z79.899 ENCOUNTER FOR LONG-TERM (CURRENT) USE OF HIGH-RISK MEDICATION: ICD-10-CM

## 2020-03-14 DIAGNOSIS — L73.2 HIDRADENITIS SUPPURATIVA: ICD-10-CM

## 2020-03-14 LAB
ALBUMIN SERPL-MCNC: 3.8 G/DL (ref 3.4–5)
ALP SERPL-CCNC: 83 U/L (ref 40–150)
ALT SERPL W P-5'-P-CCNC: 86 U/L (ref 0–70)
ANION GAP SERPL CALCULATED.3IONS-SCNC: 5 MMOL/L (ref 3–14)
AST SERPL W P-5'-P-CCNC: 40 U/L (ref 0–45)
BASOPHILS # BLD AUTO: 0.1 10E9/L (ref 0–0.2)
BASOPHILS NFR BLD AUTO: 0.5 %
BILIRUB SERPL-MCNC: 0.5 MG/DL (ref 0.2–1.3)
BUN SERPL-MCNC: 9 MG/DL (ref 7–30)
CALCIUM SERPL-MCNC: 9 MG/DL (ref 8.5–10.1)
CHLORIDE SERPL-SCNC: 108 MMOL/L (ref 94–109)
CO2 SERPL-SCNC: 27 MMOL/L (ref 20–32)
CREAT SERPL-MCNC: 0.87 MG/DL (ref 0.66–1.25)
DIFFERENTIAL METHOD BLD: NORMAL
EOSINOPHIL # BLD AUTO: 0.4 10E9/L (ref 0–0.7)
EOSINOPHIL NFR BLD AUTO: 3.3 %
ERYTHROCYTE [DISTWIDTH] IN BLOOD BY AUTOMATED COUNT: 13.4 % (ref 10–15)
GFR SERPL CREATININE-BSD FRML MDRD: >90 ML/MIN/{1.73_M2}
GLUCOSE SERPL-MCNC: 117 MG/DL (ref 70–99)
HCT VFR BLD AUTO: 48.2 % (ref 40–53)
HGB BLD-MCNC: 16.2 G/DL (ref 13.3–17.7)
IMM GRANULOCYTES # BLD: 0.1 10E9/L (ref 0–0.4)
IMM GRANULOCYTES NFR BLD: 0.8 %
LYMPHOCYTES # BLD AUTO: 3.4 10E9/L (ref 0.8–5.3)
LYMPHOCYTES NFR BLD AUTO: 30.8 %
MCH RBC QN AUTO: 31.8 PG (ref 26.5–33)
MCHC RBC AUTO-ENTMCNC: 33.6 G/DL (ref 31.5–36.5)
MCV RBC AUTO: 95 FL (ref 78–100)
MONOCYTES # BLD AUTO: 0.9 10E9/L (ref 0–1.3)
MONOCYTES NFR BLD AUTO: 8.4 %
NEUTROPHILS # BLD AUTO: 6.2 10E9/L (ref 1.6–8.3)
NEUTROPHILS NFR BLD AUTO: 56.2 %
NRBC # BLD AUTO: 0 10*3/UL
NRBC BLD AUTO-RTO: 0 /100
PLATELET # BLD AUTO: 317 10E9/L (ref 150–450)
POTASSIUM SERPL-SCNC: 4.1 MMOL/L (ref 3.4–5.3)
PROT SERPL-MCNC: 7.6 G/DL (ref 6.8–8.8)
RBC # BLD AUTO: 5.1 10E12/L (ref 4.4–5.9)
SODIUM SERPL-SCNC: 140 MMOL/L (ref 133–144)
WBC # BLD AUTO: 11 10E9/L (ref 4–11)

## 2020-03-16 DIAGNOSIS — R79.89 ELEVATED LFTS: ICD-10-CM

## 2020-03-16 DIAGNOSIS — Z79.899 ENCOUNTER FOR LONG-TERM (CURRENT) USE OF HIGH-RISK MEDICATION: ICD-10-CM

## 2020-03-16 DIAGNOSIS — L73.2 HIDRADENITIS SUPPURATIVA: ICD-10-CM

## 2020-03-16 DIAGNOSIS — Z79.899 ENCOUNTER FOR LONG-TERM (CURRENT) USE OF HIGH-RISK MEDICATION: Primary | ICD-10-CM

## 2020-03-16 RX ORDER — METHOTREXATE 2.5 MG/1
10 TABLET ORAL WEEKLY
Qty: 24 TABLET | Refills: 3 | Status: SHIPPED | OUTPATIENT
Start: 2020-03-16 | End: 2020-08-06

## 2020-03-27 ENCOUNTER — HOME INFUSION (PRE-WILLOW HOME INFUSION) (OUTPATIENT)
Dept: PHARMACY | Facility: CLINIC | Age: 35
End: 2020-03-27

## 2020-03-30 ENCOUNTER — HOME INFUSION (PRE-WILLOW HOME INFUSION) (OUTPATIENT)
Dept: PHARMACY | Facility: CLINIC | Age: 35
End: 2020-03-30

## 2020-03-31 NOTE — PROGRESS NOTES
This is a recent snapshot of the patient's [facility] medical record.  For current drug dose and complete information and questions, call [phone] or In Basket pool, fv home infusion ([id number])  CSN Number:  [id number]

## 2020-03-31 NOTE — PROGRESS NOTES
This is a recent snapshot of the patient's [facility] medical record.  For current drug dose and complete information and questions, call [phone] or In Basket pool, fv home infusion ([id number])  CSN Number:  [id number]

## 2020-04-07 ENCOUNTER — TELEPHONE (OUTPATIENT)
Dept: PHARMACY | Facility: CLINIC | Age: 35
End: 2020-04-07

## 2020-04-07 NOTE — TELEPHONE ENCOUNTER
[facility] PRIOR AUTHORIZATION REQUEST     Drug including DOSE: Remicade 700mg q5wks  J Code:  NDC: [address]-12  ICD 10 code: L73.2    Date(s) of Service: 4/26/20-4/26/21    Insurance Name:[employer]  Insurance ID:[id number]    Provider: Dennis Denny  Provider NPI: [id number]      [facility]  NPI: [id number]

## 2020-04-07 NOTE — TELEPHONE ENCOUNTER
PA Initiation    Medication: Remicade  Insurance Company: [employer] - Phone [phone] Fax [phone]  Pharmacy Filling the Rx: [facility]  Filling Pharmacy Phone:    Filling Pharmacy Fax:    Start Date: 4/7/2020    Central Prior Authorization Team   Phone: [phone]      Reference Number  [id number]

## 2020-04-09 NOTE — TELEPHONE ENCOUNTER
[employer] has to be submitted via form- filled out form and faxed it to them at fax# [phone].

## 2020-04-20 NOTE — TELEPHONE ENCOUNTER
Prior Authorization Approval    Authorization Effective Date: 4/26/2020  Authorization Expiration Date: 4/26/2021  Medication: Remicade  Approved Dose/Quantity: 700mg  Reference #: [id number]   Insurance Company: [employer] - Phone [phone] Fax [phone]  Which Pharmacy is filling the prescription (Not needed for infusion/clinic administered): [facility]  Pharmacy Notified: Yes

## 2020-04-20 NOTE — TELEPHONE ENCOUNTER
Called [employer] at [phone] to check status of this authorization, per representative this was approved with dates 04/26/2020-04/26/2021 ref# [id number]. She will re-fax the approval letter and will document once received.

## 2020-05-01 ENCOUNTER — HOME INFUSION (PRE-WILLOW HOME INFUSION) (OUTPATIENT)
Dept: PHARMACY | Facility: CLINIC | Age: 35
End: 2020-05-01

## 2020-05-04 ENCOUNTER — HOME INFUSION (PRE-WILLOW HOME INFUSION) (OUTPATIENT)
Dept: OTHER | Facility: CLINIC | Age: 35
End: 2020-05-04

## 2020-05-04 ENCOUNTER — HOME INFUSION (PRE-WILLOW HOME INFUSION) (OUTPATIENT)
Dept: PHARMACY | Facility: CLINIC | Age: 35
End: 2020-05-04

## 2020-05-04 LAB
ALT SERPL W P-5'-P-CCNC: 110 U/L (ref 0–70)
AST SERPL W P-5'-P-CCNC: 65 U/L (ref 0–45)

## 2020-05-04 PROCEDURE — 84450 TRANSFERASE (AST) (SGOT): CPT | Performed by: DERMATOLOGY

## 2020-05-04 PROCEDURE — 84460 ALANINE AMINO (ALT) (SGPT): CPT | Performed by: DERMATOLOGY

## 2020-05-04 NOTE — PROGRESS NOTES
Skilled Nurse visit in the  [facility] Infusion Suite to administer Remicade.  No recent elevated temperature, fever, chills, productive cough, coughing for 3 weeks or longer or hemoptysis, abnormal vital signs, night sweats, chest pain. No  decrease in your appetite, unexplained weight loss or fatigue.  No other new onset medical symptoms.  Current weight 343 lb.  PIV placed Right AC, 1 attempt.  Pre medicated with Methylprednisolone 40 mg IV. Labs drawn AST and ALT. Infusion completed with/without complication or reaction. Pt reports therapy is effective  in managing symptoms related to therapy.  Orquidea Blankenship RN  [facility]  [email or web address]

## 2020-05-04 NOTE — PROGRESS NOTES
This is a recent snapshot of the patient's [facility] medical record.  For current drug dose and complete information and questions, call [phone] or In Basket pool, fv home infusion ([id number])  CSN Number:  [id number]

## 2020-05-05 ENCOUNTER — TELEPHONE (OUTPATIENT)
Dept: DERMATOLOGY | Facility: CLINIC | Age: 35
End: 2020-05-05

## 2020-05-05 NOTE — TELEPHONE ENCOUNTER
Called pt and LVM. I wanted to see if he was having a flare. If so Madeline would like to keep his appointment in person. If not, we need to change it to a virtual visit. Or he can reschedule in July or August. Clinic number provided.    PATO Banegas

## 2020-05-05 NOTE — PROGRESS NOTES
This is a recent snapshot of the patient's [facility] medical record.  For current drug dose and complete information and questions, call [phone] or In Basket pool, fv home infusion ([id number])  CSN Number:  [id number]

## 2020-05-06 DIAGNOSIS — R79.89 ELEVATED LFTS: Primary | ICD-10-CM

## 2020-05-15 ENCOUNTER — MEDICAL CORRESPONDENCE (OUTPATIENT)
Dept: HEALTH INFORMATION MANAGEMENT | Facility: CLINIC | Age: 35
End: 2020-05-15

## 2020-06-05 ENCOUNTER — HOME INFUSION (PRE-WILLOW HOME INFUSION) (OUTPATIENT)
Dept: PHARMACY | Facility: CLINIC | Age: 35
End: 2020-06-05

## 2020-06-08 ENCOUNTER — DOCUMENTATION ONLY (OUTPATIENT)
Dept: PHARMACY | Facility: CLINIC | Age: 35
End: 2020-06-08

## 2020-06-08 ENCOUNTER — MEDICAL CORRESPONDENCE (OUTPATIENT)
Dept: HEALTH INFORMATION MANAGEMENT | Facility: CLINIC | Age: 35
End: 2020-06-08

## 2020-06-08 ENCOUNTER — HOME INFUSION (PRE-WILLOW HOME INFUSION) (OUTPATIENT)
Dept: PHARMACY | Facility: CLINIC | Age: 35
End: 2020-06-08

## 2020-06-08 LAB
ALT SERPL W P-5'-P-CCNC: 115 U/L (ref 0–70)
AST SERPL W P-5'-P-CCNC: 89 U/L (ref 0–45)

## 2020-06-08 PROCEDURE — 84460 ALANINE AMINO (ALT) (SGPT): CPT | Performed by: DERMATOLOGY

## 2020-06-08 PROCEDURE — 84450 TRANSFERASE (AST) (SGOT): CPT | Performed by: DERMATOLOGY

## 2020-06-08 NOTE — PROGRESS NOTES
Skilled Nurse visit in the [facility] Infusion Suite to administer Remicade 700 mg in 250 ml IV.  No recent elevated temperature, fever, chills, productive cough, coughing for 3 weeks or longer or hemoptysis, abnormal vital signs, night sweats, chest pain. No  decrease in your appetite, unexplained weight loss or fatigue.  No other new onset medical symptoms.  Current weight 348.6 lbs.  PIV placed right AC, 1 attempt.  Pre medicated with Methylprednisolone 40 mg IVP. Labs drawn pre-infusion:  AST and ALT. Infusion completed without complication or reaction. Pt reports therapy is effective in managing symptoms related to therapy.    CONCEPCION LevinN, RN  [phone]  [email or web address]

## 2020-06-08 NOTE — PROGRESS NOTES
This is a recent snapshot of the patient's [facility] medical record.  For current drug dose and complete information and questions, call [phone] or In Basket pool, fv home infusion ([id number])  CSN Number:  [id number]

## 2020-06-09 NOTE — PROGRESS NOTES
This is a recent snapshot of the patient's [facility] medical record.  For current drug dose and complete information and questions, call [phone] or In Basket pool, fv home infusion ([id number])  CSN Number:  [id number]

## 2020-06-11 ENCOUNTER — MEDICAL CORRESPONDENCE (OUTPATIENT)
Dept: HEALTH INFORMATION MANAGEMENT | Facility: CLINIC | Age: 35
End: 2020-06-11

## 2020-07-09 ENCOUNTER — HOME INFUSION (PRE-WILLOW HOME INFUSION) (OUTPATIENT)
Dept: PHARMACY | Facility: CLINIC | Age: 35
End: 2020-07-09

## 2020-07-10 NOTE — PROGRESS NOTES
This is a recent snapshot of the patient's [facility] medical record.  For current drug dose and complete information and questions, call [phone] or In Basket pool, fv home infusion ([id number])  CSN Number:  [id number]

## 2020-07-13 ENCOUNTER — DOCUMENTATION ONLY (OUTPATIENT)
Dept: PHARMACY | Facility: CLINIC | Age: 35
End: 2020-07-13

## 2020-07-13 ENCOUNTER — HOME INFUSION (PRE-WILLOW HOME INFUSION) (OUTPATIENT)
Dept: PHARMACY | Facility: CLINIC | Age: 35
End: 2020-07-13

## 2020-07-14 NOTE — PROGRESS NOTES
Skilled Nurse visit in the hospitals Infusion Suite to administer Remicade 700 mg in 250 ml IV.  No recent elevated temperature, fever, chills, productive cough, coughing for 3 weeks or longer or hemoptysis, abnormal vital signs, night sweats, chest pain. No  decrease in appetite, unexplained weight loss or fatigue.  No other new onset medical symptoms.  Current weight 330 lbs.  PIV placed right AC, 5 attempts.  Pre medicated with Methylprednisolone 40 mg IVP.  Infusion completed without complication or reaction. Pt reports therapy is effective in managing symptoms related to therapy.    CONCEPCION LevinN, RN  [phone]  [email or web address]

## 2020-07-14 NOTE — PROGRESS NOTES
This is a recent snapshot of the patient's [facility] medical record.  For current drug dose and complete information and questions, call [phone] or In Basket pool, fv home infusion ([id number])  CSN Number:  [id number]

## 2020-08-06 ENCOUNTER — OFFICE VISIT (OUTPATIENT)
Dept: DERMATOLOGY | Facility: CLINIC | Age: 35
End: 2020-08-06
Payer: COMMERCIAL

## 2020-08-06 DIAGNOSIS — D48.5 NEOPLASM OF UNCERTAIN BEHAVIOR OF SKIN: ICD-10-CM

## 2020-08-06 DIAGNOSIS — L73.2 HIDRADENITIS SUPPURATIVA: Primary | ICD-10-CM

## 2020-08-06 DIAGNOSIS — Z87.898 HISTORY OF ATYPICAL NEVUS: ICD-10-CM

## 2020-08-06 DIAGNOSIS — Z79.899 ENCOUNTER FOR LONG-TERM (CURRENT) USE OF HIGH-RISK MEDICATION: ICD-10-CM

## 2020-08-06 RX ORDER — FOLIC ACID 1 MG/1
1 TABLET ORAL DAILY
Qty: 90 TABLET | Refills: 3 | Status: SHIPPED | OUTPATIENT
Start: 2020-08-06 | End: 2020-08-06

## 2020-08-06 RX ORDER — LIDOCAINE HYDROCHLORIDE AND EPINEPHRINE 10; 10 MG/ML; UG/ML
3 INJECTION, SOLUTION INFILTRATION; PERINEURAL ONCE
Status: ACTIVE | OUTPATIENT
Start: 2020-08-06

## 2020-08-06 RX ORDER — METHOTREXATE 2.5 MG/1
10 TABLET ORAL WEEKLY
Qty: 24 TABLET | Refills: 3 | Status: SHIPPED | OUTPATIENT
Start: 2020-08-06 | End: 2020-08-06

## 2020-08-06 ASSESSMENT — PAIN SCALES - GENERAL
PAINLEVEL: NO PAIN (0)
PAINLEVEL: NO PAIN (0)

## 2020-08-06 NOTE — NURSING NOTE
Dermatology Rooming Note    Magrarito Sofia's goals for this visit include:   Chief Complaint   Patient presents with     Derm Problem     Jarrod is following up for HS, states things are stable, but his left axilla and the back of his neck still flare. Has a concerning leson on his scalp that has grown in size.       Deandra Esteves LPN

## 2020-08-06 NOTE — NURSING NOTE
Lidocaine-epinephrine 1-1:474846 % injection   1mL once for one use, starting 8/6/2020 ending 8/6/2020,  2mL disp, R-0, injection  Injected by PATO Vicente

## 2020-08-06 NOTE — PATIENT INSTRUCTIONS

## 2020-08-06 NOTE — LETTER
8/6/2020      RE: Margarito Sfoia  [address]  [place] [address]       [facility] Dermatology Note      Dermatology Problem List:  1. Hidradenitis suppurativa.  -current recommended tx: infliximab [id number] with 40mg methylpred  - Patient due for next TB Quantiferon test in August of 2021  -past tx: has been on ABx for acne (incl Bactrim), clindamycin, rifampin, prednisone, bactrim, methotrexate 15mg (discontinued Summer 2020 due to elevated LFTs)  2. Acne vulgaris  -Has tried Accutane and a number of antibiotics  3. Hx of atypical nevus - ~2005 - removed in [place] -patient will bring us records at next visit    CC:   Chief Complaint   Patient presents with     Derm Problem     Jarrod is following up for HS, states things are stable, but his left axilla and the back of his neck still flare. Has a concerning leson on his scalp that has grown in size.     Encounter Date: Aug 6, 2020    History of Present Illness:  Mr. Margarito Sofia is a 34 year old male who returns to follow-up for HS as well as have a lesion evaluated on his scalp.  Notes today the lesion on the scalp used to be flat, but now has grown in size. It was flat he says at the last appointment with me in Feb 2020. It does not bleed and is not tender or itchy. He often nicks it when he cuts his hair, it bleeds then but does not spontaneous bleed. No hx of skin cancer, but has had precancerous cells removed off the back he states. Most recent was in ~2015.     Regarding his HS, Infusions every 5 weeks currently - HS stable despite recently discontinuing methotrexate. Only notes his left axilla won't fully heal and there is a deep tender spot there. Otherwise right axilla is doing great. No active areas in he groin. Notes scarring in the groin, but it is continuing to imrpove/heal. No changes in medical hx since February. He does say he stopped the methotrexate due to elevated liver function tests at his [facility] lab  appointment. Otherwise doing well and has no other concerns today.    Past Medical History:   Patient Active Problem List   Diagnosis     Hidradenitis suppurativa     No past medical history on file.  No past surgical history on file.    Social History:  reports that he has never smoked. He has never used smokeless tobacco. Works as a .    Family History:  There is no family history of skin cancer. Does not know father's fhx.  Mother with IBD    Medications:  Current Outpatient Medications   Medication Sig Dispense Refill     chlorhexidine (HIBICLENS) 4 % liquid Wash boil-prone areas three times weekly while in shower       fluorometholone (FML LIQUIFILM) 0.1 % ophthalmic suspension SHAKE LQ AND INT 1 GTT IN OU D  5     folic acid (FOLVITE) 1 MG tablet Take 1 tablet (1 mg) by mouth daily 90 tablet 3     methotrexate sodium 2.5 MG TABS Take 4 tablets (10 mg) by mouth once a week 24 tablet 3     No Known Allergies    Review of Systems:  -Heme/Lymph: no concerning bumps, no bleeding or bruising problems   -Constitutional: The patient denies fatigue, fevers, chills, unintended weight loss, and night sweats.  -HEENT: Patient denies nonhealing oral sores.  -Skin: As above in HPI. No additional skin concerns.  -GI: no nausea, abdominal pain, vomiting, diarrhea or blood in the stool    Physical exam:  Vitals: There were no vitals taken for this visit.  GEN: This is a well developed, well-nourished male in no acute distress, in a pleasant mood.    SKIN: Waist-up skin, which includes the head/face, neck, both arms, chest, back, abdomen, digits and/or nails was examined.  -5mm bright red papule on the mid frontal forehead  -Left axilla with a 2cm indurated mildly tender tract  -right axilla with healing erythematous macules and several skin tags  -well healed scar on the left upper back 2/2/ excision  -No other lesions of concern on areas examined.     Impression/Plan:  1. Neoplasm of uncertain behavior on the  mid frontal scalp. The differential diagnosis includes Inflamed angioma vs other.   -Shave biopsy:  After discussion of benefits and risks including but not limited to bleeding/bruising, pain/swelling, infection, scar, incomplete removal, nerve damage/numbness, recurrence, and non-diagnostic biopsy, written consent, verbal consent and photographs were obtained. Time-out was performed. The area was cleaned with isopropyl alcohol. 0.5ml of 1% lidocaine with 1:100,000 epinephrine was injected to obtain adequate anesthesia. A shave biopsy was performed. Hemostasis was achieved with aluminium chloride. Vaseline and a sterile dressing were applied. The patient tolerated the procedure and no complications were noted. The patient was provided with verbal and written post care instructions.  -Photograph was obtained for clinical monitoring and inclusion in medical record.    2. HS - left axilla - flare - otherwise very stable on Remicade infusions every 5 weeks.  -Kenalog intralesional injection procedure note: After verbal consent and discussion of risks including but not limited to atrophy, pain, and bruising, time out was performed, the patient underwent positioning and the area was prepped with isopropyl alcohol, 1 total cc of Kenalog 10 mg/cc was injected into 4 site(s) on the left axilla. The patient tolerated the procedure well and left the Dermatology clinic in good condition.   - Per Dr. Denny, the patient has been approved for rapid 1 hour infliximab infusions   - The patient is to continue with infliximab infusions q5 weeks with 40mg methlypred. Headaches noticed as a side  effect with infusions but treatable with advil. Will space out treatment to q6 weeks if patient continues to do well.    - CBC and CMP, and TB Gold obtained today   - Discontinue methotrexate 15mg weekly and daily folic acid 2/2 elevated LFTs on recent labs.   - Patient due for next TB Quantiferon test in August of 2021    3. Hx of atypical  nevus - left upper back s/p excision ~2005  -Patient to bring us records from excision that took place in 2005 in [place]  -Continue with FBSE annually or bi-annually  Sunscreen: Apply 20 minutes prior to going outdoors and reapply every two hours, when wet or sweating. We recommend using an SPF 30 or higher, and to use one that is water resistant.      CC Dr. Denny & Dr. Londono on close of this encounter.  Follow-up in 6 months, earlier for new or changing lesions.     Staff Involved:  Staff Only  All risks, benefits and alternatives were discussed with patient.  Patient is in agreement and understands the assessment and plan.  All questions were answered.    Madeline Peoples PA-C, MPAS  [facility] [place]: Phone: [phone], Fax: [phone]  [facility]: Phone: [phone],  Fax: [phone]

## 2020-08-06 NOTE — PROGRESS NOTES
[facility] Dermatology Note      Dermatology Problem List:  1. Hidradenitis suppurativa.  -current recommended tx: infliximab [id number] with 40mg methylpred  - Patient due for next TB Quantiferon test in August of 2021  -past tx: has been on ABx for acne (incl Bactrim), clindamycin, rifampin, prednisone, bactrim, methotrexate 15mg (discontinued Summer 2020 due to elevated LFTs)  2. Acne vulgaris  -Has tried Accutane and a number of antibiotics  3. Hx of atypical nevus - ~2005 - removed in [place] -patient will bring us records at next visit    CC:   Chief Complaint   Patient presents with     Derm Problem     Jarrod is following up for HS, states things are stable, but his left axilla and the back of his neck still flare. Has a concerning leson on his scalp that has grown in size.     Encounter Date: Aug 6, 2020    History of Present Illness:  Mr. Margarito Sofia is a 34 year old male who returns to follow-up for HS as well as have a lesion evaluated on his scalp.  Notes today the lesion on the scalp used to be flat, but now has grown in size. It was flat he says at the last appointment with me in Feb 2020. It does not bleed and is not tender or itchy. He often nicks it when he cuts his hair, it bleeds then but does not spontaneous bleed. No hx of skin cancer, but has had precancerous cells removed off the back he states. Most recent was in ~2015.     Regarding his HS, Infusions every 5 weeks currently - HS stable despite recently discontinuing methotrexate. Only notes his left axilla won't fully heal and there is a deep tender spot there. Otherwise right axilla is doing great. No active areas in he groin. Notes scarring in the groin, but it is continuing to imrpove/heal. No changes in medical hx since February. He does say he stopped the methotrexate due to elevated liver function tests at his las lab appointment. Otherwise doing well and has no other concerns today.    Past Medical History:    Patient Active Problem List   Diagnosis     Hidradenitis suppurativa     No past medical history on file.  No past surgical history on file.    Social History:  reports that he has never smoked. He has never used smokeless tobacco. Works as a .    Family History:  There is no family history of skin cancer. Does not know father's fhx.  Mother with IBD    Medications:  Current Outpatient Medications   Medication Sig Dispense Refill     chlorhexidine (HIBICLENS) 4 % liquid Wash boil-prone areas three times weekly while in shower       fluorometholone (FML LIQUIFILM) 0.1 % ophthalmic suspension SHAKE LQ AND INT 1 GTT IN OU D  5     folic acid (FOLVITE) 1 MG tablet Take 1 tablet (1 mg) by mouth daily 90 tablet 3     methotrexate sodium 2.5 MG TABS Take 4 tablets (10 mg) by mouth once a week 24 tablet 3     No Known Allergies    Review of Systems:  -Heme/Lymph: no concerning bumps, no bleeding or bruising problems   -Constitutional: The patient denies fatigue, fevers, chills, unintended weight loss, and night sweats.  -HEENT: Patient denies nonhealing oral sores.  -Skin: As above in HPI. No additional skin concerns.  -GI: no nausea, abdominal pain, vomiting, diarrhea or blood in the stool    Physical exam:  Vitals: There were no vitals taken for this visit.  GEN: This is a well developed, well-nourished male in no acute distress, in a pleasant mood.    SKIN: Waist-up skin, which includes the head/face, neck, both arms, chest, back, abdomen, digits and/or nails was examined.  -5mm bright red papule on the mid frontal forehead  -Left axilla with a 2cm indurated mildly tender tract  -right axilla with healing erythematous macules and several skin tags  -well healed scar on the left upper back 2/2/ excision  -No other lesions of concern on areas examined.     Impression/Plan:  1. Neoplasm of uncertain behavior on the mid frontal scalp. The differential diagnosis includes Inflamed angioma vs other.   -Shave  biopsy:  After discussion of benefits and risks including but not limited to bleeding/bruising, pain/swelling, infection, scar, incomplete removal, nerve damage/numbness, recurrence, and non-diagnostic biopsy, written consent, verbal consent and photographs were obtained. Time-out was performed. The area was cleaned with isopropyl alcohol. 0.5ml of 1% lidocaine with 1:100,000 epinephrine was injected to obtain adequate anesthesia. A shave biopsy was performed. Hemostasis was achieved with aluminium chloride. Vaseline and a sterile dressing were applied. The patient tolerated the procedure and no complications were noted. The patient was provided with verbal and written post care instructions.  -Photograph was obtained for clinical monitoring and inclusion in medical record.    2. HS - left axilla - flare - otherwise very stable on Remicade infusions every 5 weeks.  -Kenalog intralesional injection procedure note: After verbal consent and discussion of risks including but not limited to atrophy, pain, and bruising, time out was performed, the patient underwent positioning and the area was prepped with isopropyl alcohol, 1 total cc of Kenalog 10 mg/cc was injected into 4 site(s) on the left axilla. The patient tolerated the procedure well and left the Dermatology clinic in good condition.   - Per Dr. Denny, the patient has been approved for rapid 1 hour infliximab infusions   - The patient is to continue with infliximab infusions q5 weeks with 40mg methlypred. Headaches noticed as a side  effect with infusions but treatable with advil. Will space out treatment to q6 weeks if patient continues to do well.    - CBC and CMP, and TB Gold obtained today   - Discontinue methotrexate 15mg weekly and daily folic acid 2/2 elevated LFTs on recent labs.   - Patient due for next TB Quantiferon test in August of 2021    3. Hx of atypical nevus - left upper back s/p excision ~2005  -Patient to bring us records from excision that  took place in 2005 in [place]  -Continue with FBSE annually or bi-annually  Sunscreen: Apply 20 minutes prior to going outdoors and reapply every two hours, when wet or sweating. We recommend using an SPF 30 or higher, and to use one that is water resistant.      CC Dr. Denny & Dr. Londono on close of this encounter.  Follow-up in 6 months, earlier for new or changing lesions.       Staff Involved:  Staff Only  All risks, benefits and alternatives were discussed with patient.  Patient is in agreement and understands the assessment and plan.  All questions were answered.    Madeline Peoples PA-C, MPAS  [facility] [place]: Phone: [phone], Fax: [phone]  [facility]: Phone: [phone],  Fax: [phone]

## 2020-08-06 NOTE — LETTER
8/6/2020       RE: Margarito Sofia  [address]  [place] [address]     Dear Colleague,    Thank you for referring your patient, Margarito Sofia, to the [facility] DERMATOLOGY at [facility]. Please see a copy of my visit note below.    [facility] Dermatology Note      Dermatology Problem List:  1. Hidradenitis suppurativa.  -current recommended tx: infliximab [id number] with 40mg methylpred  - Patient due for next TB Quantiferon test in August of 2021  -past tx: has been on ABx for acne (incl Bactrim), clindamycin, rifampin, prednisone, bactrim, methotrexate 15mg (discontinued Summer 2020 due to elevated LFTs)  2. Acne vulgaris  -Has tried Accutane and a number of antibiotics  3. Hx of atypical nevus - ~2005 - removed in [place] -patient will bring us records at next visit    CC:   Chief Complaint   Patient presents with     Derm Problem     Jarrod is following up for HS, states things are stable, but his left axilla and the back of his neck still flare. Has a concerning leson on his scalp that has grown in size.     Encounter Date: Aug 6, 2020    History of Present Illness:  Mr. Margarito Sofia is a 34 year old male who returns to follow-up for HS as well as have a lesion evaluated on his scalp.  Notes today the lesion on the scalp used to be flat, but now has grown in size. It was flat he says at the last appointment with me in Feb 2020. It does not bleed and is not tender or itchy. He often nicks it when he cuts his hair, it bleeds then but does not spontaneous bleed. No hx of skin cancer, but has had precancerous cells removed off the back he states. Most recent was in ~2015.     Regarding his HS, Infusions every 5 weeks currently - HS stable despite recently discontinuing methotrexate. Only notes his left axilla won't fully heal and there is a deep tender spot there. Otherwise right axilla is doing great. No active areas in he groin. Notes  scarring in the groin, but it is continuing to imrpove/heal. No changes in medical hx since February. He does say he stopped the methotrexate due to elevated liver function tests at his las lab appointment. Otherwise doing well and has no other concerns today.    Past Medical History:   Patient Active Problem List   Diagnosis     Hidradenitis suppurativa     No past medical history on file.  No past surgical history on file.    Social History:  reports that he has never smoked. He has never used smokeless tobacco. Works as a .    Family History:  There is no family history of skin cancer. Does not know father's fhx.  Mother with IBD    Medications:  Current Outpatient Medications   Medication Sig Dispense Refill     chlorhexidine (HIBICLENS) 4 % liquid Wash boil-prone areas three times weekly while in shower       fluorometholone (FML LIQUIFILM) 0.1 % ophthalmic suspension SHAKE LQ AND INT 1 GTT IN OU D  5     folic acid (FOLVITE) 1 MG tablet Take 1 tablet (1 mg) by mouth daily 90 tablet 3     methotrexate sodium 2.5 MG TABS Take 4 tablets (10 mg) by mouth once a week 24 tablet 3     No Known Allergies    Review of Systems:  -Heme/Lymph: no concerning bumps, no bleeding or bruising problems   -Constitutional: The patient denies fatigue, fevers, chills, unintended weight loss, and night sweats.  -HEENT: Patient denies nonhealing oral sores.  -Skin: As above in HPI. No additional skin concerns.  -GI: no nausea, abdominal pain, vomiting, diarrhea or blood in the stool    Physical exam:  Vitals: There were no vitals taken for this visit.  GEN: This is a well developed, well-nourished male in no acute distress, in a pleasant mood.    SKIN: Waist-up skin, which includes the head/face, neck, both arms, chest, back, abdomen, digits and/or nails was examined.  -5mm bright red papule on the mid frontal forehead  -Left axilla with a 2cm indurated mildly tender tract  -right axilla with healing erythematous  macules and several skin tags  -well healed scar on the left upper back 2/2/ excision  -No other lesions of concern on areas examined.     Impression/Plan:  1. Neoplasm of uncertain behavior on the mid frontal scalp. The differential diagnosis includes Inflamed angioma vs other.   -Shave biopsy:  After discussion of benefits and risks including but not limited to bleeding/bruising, pain/swelling, infection, scar, incomplete removal, nerve damage/numbness, recurrence, and non-diagnostic biopsy, written consent, verbal consent and photographs were obtained. Time-out was performed. The area was cleaned with isopropyl alcohol. 0.5ml of 1% lidocaine with 1:100,000 epinephrine was injected to obtain adequate anesthesia. A shave biopsy was performed. Hemostasis was achieved with aluminium chloride. Vaseline and a sterile dressing were applied. The patient tolerated the procedure and no complications were noted. The patient was provided with verbal and written post care instructions.  -Photograph was obtained for clinical monitoring and inclusion in medical record.    2. HS - left axilla - flare - otherwise very stable on Remicade infusions every 5 weeks.  -Kenalog intralesional injection procedure note: After verbal consent and discussion of risks including but not limited to atrophy, pain, and bruising, time out was performed, the patient underwent positioning and the area was prepped with isopropyl alcohol, 1 total cc of Kenalog 10 mg/cc was injected into 4 site(s) on the left axilla. The patient tolerated the procedure well and left the Dermatology clinic in good condition.   - Per Dr. Denny, the patient has been approved for rapid 1 hour infliximab infusions   - The patient is to continue with infliximab infusions q5 weeks with 40mg methlypred. Headaches noticed as a side  effect with infusions but treatable with advil. Will space out treatment to q6 weeks if patient continues to do well.    - CBC and CMP, and TB  Gold obtained today   - Discontinue methotrexate 15mg weekly and daily folic acid 2/2 elevated LFTs on recent labs.   - Patient due for next TB Quantiferon test in August of 2021    3. Hx of atypical nevus - left upper back s/p excision ~2005  -Patient to bring us records from excision that took place in 2005 in [place]  -Continue with FBSE annually or bi-annually  Sunscreen: Apply 20 minutes prior to going outdoors and reapply every two hours, when wet or sweating. We recommend using an SPF 30 or higher, and to use one that is water resistant.      CC Dr. Denny & Dr. Londono on close of this encounter.  Follow-up in 6 months, earlier for new or changing lesions.       Staff Involved:  Staff Only  All risks, benefits and alternatives were discussed with patient.  Patient is in agreement and understands the assessment and plan.  All questions were answered.    Madeline Peoples PA-C, MPAS  [facility] [place]: Phone: [phone], Fax: [phone]  [facility]: Phone: [phone],  Fax: [phone]              Again, thank you for allowing me to participate in the care of your patient.      Sincerely,    Madeline Peoples PA-C

## 2020-08-09 LAB — COPATH REPORT: NORMAL

## 2020-08-14 ENCOUNTER — HOME INFUSION (PRE-WILLOW HOME INFUSION) (OUTPATIENT)
Dept: PHARMACY | Facility: CLINIC | Age: 35
End: 2020-08-14

## 2020-08-17 ENCOUNTER — DOCUMENTATION ONLY (OUTPATIENT)
Dept: PHARMACY | Facility: CLINIC | Age: 35
End: 2020-08-17

## 2020-08-17 ENCOUNTER — HOSPITAL ENCOUNTER (OUTPATIENT)
Facility: CLINIC | Age: 35
Setting detail: SPECIMEN
Discharge: HOME | End: 2020-08-17
Admitting: DERMATOLOGY
Payer: COMMERCIAL

## 2020-08-17 ENCOUNTER — HOME INFUSION (PRE-WILLOW HOME INFUSION) (OUTPATIENT)
Dept: PHARMACY | Facility: CLINIC | Age: 35
End: 2020-08-17

## 2020-08-17 LAB
ALBUMIN SERPL-MCNC: 3.3 G/DL (ref 3.4–5)
ALP SERPL-CCNC: 86 U/L (ref 40–150)
ALT SERPL W P-5'-P-CCNC: 73 U/L (ref 0–70)
ANION GAP SERPL CALCULATED.3IONS-SCNC: 4 MMOL/L (ref 3–14)
AST SERPL W P-5'-P-CCNC: ABNORMAL U/L (ref 0–45)
BASOPHILS # BLD AUTO: 0 10E9/L (ref 0–0.2)
BASOPHILS NFR BLD AUTO: 0.2 %
BILIRUB SERPL-MCNC: 0.5 MG/DL (ref 0.2–1.3)
BUN SERPL-MCNC: 8 MG/DL (ref 7–30)
CALCIUM SERPL-MCNC: 8.7 MG/DL (ref 8.5–10.1)
CHLORIDE SERPL-SCNC: 106 MMOL/L (ref 94–109)
CO2 SERPL-SCNC: 27 MMOL/L (ref 20–32)
CREAT SERPL-MCNC: 0.75 MG/DL (ref 0.66–1.25)
DIFFERENTIAL METHOD BLD: ABNORMAL
EOSINOPHIL # BLD AUTO: 0.4 10E9/L (ref 0–0.7)
EOSINOPHIL NFR BLD AUTO: 2.9 %
ERYTHROCYTE [DISTWIDTH] IN BLOOD BY AUTOMATED COUNT: 12.5 % (ref 10–15)
GFR SERPL CREATININE-BSD FRML MDRD: >90 ML/MIN/{1.73_M2}
GLUCOSE SERPL-MCNC: 75 MG/DL (ref 70–99)
HCT VFR BLD AUTO: 47.4 % (ref 40–53)
HGB BLD-MCNC: 15.8 G/DL (ref 13.3–17.7)
IMM GRANULOCYTES # BLD: 0.1 10E9/L (ref 0–0.4)
IMM GRANULOCYTES NFR BLD: 0.5 %
LYMPHOCYTES # BLD AUTO: 3.2 10E9/L (ref 0.8–5.3)
LYMPHOCYTES NFR BLD AUTO: 26.4 %
MCH RBC QN AUTO: 30.6 PG (ref 26.5–33)
MCHC RBC AUTO-ENTMCNC: 33.3 G/DL (ref 31.5–36.5)
MCV RBC AUTO: 92 FL (ref 78–100)
MONOCYTES # BLD AUTO: 1.1 10E9/L (ref 0–1.3)
MONOCYTES NFR BLD AUTO: 9.3 %
NEUTROPHILS # BLD AUTO: 7.4 10E9/L (ref 1.6–8.3)
NEUTROPHILS NFR BLD AUTO: 60.7 %
NRBC # BLD AUTO: 0 10*3/UL
NRBC BLD AUTO-RTO: 0 /100
PLATELET # BLD AUTO: 301 10E9/L (ref 150–450)
POTASSIUM SERPL-SCNC: 4.6 MMOL/L (ref 3.4–5.3)
PROT SERPL-MCNC: 8 G/DL (ref 6.8–8.8)
RBC # BLD AUTO: 5.17 10E12/L (ref 4.4–5.9)
SODIUM SERPL-SCNC: 137 MMOL/L (ref 133–144)
WBC # BLD AUTO: 12.3 10E9/L (ref 4–11)

## 2020-08-17 PROCEDURE — 85025 COMPLETE CBC W/AUTO DIFF WBC: CPT | Performed by: DERMATOLOGY

## 2020-08-17 PROCEDURE — 80053 COMPREHEN METABOLIC PANEL: CPT | Performed by: DERMATOLOGY

## 2020-08-17 NOTE — PROGRESS NOTES
Skilled Nurse visit in the [facility] Infusion Suite to administer Remicade 700 mg in 250 ml IV.  No recent elevated temperature, fever, chills, productive cough, coughing for 3 weeks or longer or hemoptysis, abnormal vital signs, night sweats, chest pain. No decrease in appetite, unexplained weight loss or fatigue.  No other new onset medical symptoms.  Current weight 341.8 lbs.  PIV placed right AC,1 attempt. Labs obtained pre-infusion (CBCD/P and CMP). Pre-medicated with Methylprednisolone 40 mg IVP over 5 minutes.  Infusion completed without complication or reaction. Pt reports therapy is effective in managing symptoms related to therapy.    CONCEPCION LevinN, RN  [phone]  [email or web address]

## 2020-08-17 NOTE — PROGRESS NOTES
This is a recent snapshot of the patient's [facility] medical record.  For current drug dose and complete information and questions, call [phone] or In Basket pool, fv home infusion ([id number])  CSN Number:  [id number]

## 2020-08-18 NOTE — PROGRESS NOTES
This is a recent snapshot of the patient's [facility] medical record.  For current drug dose and complete information and questions, call [phone] or In Basket pool, fv home infusion ([id number])  CSN Number:  [id number]

## 2020-08-19 ENCOUNTER — HOME INFUSION (PRE-WILLOW HOME INFUSION) (OUTPATIENT)
Dept: PHARMACY | Facility: CLINIC | Age: 35
End: 2020-08-19

## 2020-08-20 NOTE — PROGRESS NOTES
This is a recent snapshot of the patient's [facility] medical record.  For current drug dose and complete information and questions, call [phone] or In Basket pool, fv home infusion ([id number])  CSN Number:  [id number]

## 2020-08-26 ENCOUNTER — HOME INFUSION (PRE-WILLOW HOME INFUSION) (OUTPATIENT)
Dept: PHARMACY | Facility: CLINIC | Age: 35
End: 2020-08-26

## 2020-08-28 NOTE — PROGRESS NOTES
This is a recent snapshot of the patient's [facility] medical record.  For current drug dose and complete information and questions, call [phone] or In Basket pool, fv home infusion ([id number])  CSN Number:  [id number]

## 2020-09-18 ENCOUNTER — HOME INFUSION (PRE-WILLOW HOME INFUSION) (OUTPATIENT)
Dept: PHARMACY | Facility: CLINIC | Age: 35
End: 2020-09-18

## 2020-09-21 ENCOUNTER — HOME INFUSION (PRE-WILLOW HOME INFUSION) (OUTPATIENT)
Dept: PHARMACY | Facility: CLINIC | Age: 35
End: 2020-09-21

## 2020-09-21 NOTE — PROGRESS NOTES
This is a recent snapshot of the patient's [facility] medical record.  For current drug dose and complete information and questions, call [phone] or In Basket pool, [facility] home infusion ([id number])  CSN Number:  [id number]

## 2020-09-22 NOTE — PROGRESS NOTES
This is a recent snapshot of the patient's [facility] medical record.  For current drug dose and complete information and questions, call [phone] or In Basket pool, fv home infusion ([id number])  CSN Number:  [id number]

## 2020-10-13 ENCOUNTER — HOME INFUSION (PRE-WILLOW HOME INFUSION) (OUTPATIENT)
Dept: PHARMACY | Facility: CLINIC | Age: 35
End: 2020-10-13

## 2020-10-14 NOTE — PROGRESS NOTES
This is a recent snapshot of the patient's [facility] medical record.  For current drug dose and complete information and questions, call [phone] or In Basket pool, fv home infusion ([id number])  CSN Number:  [id number]

## 2020-10-21 ENCOUNTER — HOME INFUSION (PRE-WILLOW HOME INFUSION) (OUTPATIENT)
Dept: PHARMACY | Facility: CLINIC | Age: 35
End: 2020-10-21

## 2020-10-22 ENCOUNTER — HOME INFUSION (PRE-WILLOW HOME INFUSION) (OUTPATIENT)
Dept: PHARMACY | Facility: CLINIC | Age: 35
End: 2020-10-22

## 2020-10-22 NOTE — PROGRESS NOTES
This is a recent snapshot of the patient's [facility] medical record.  For current drug dose and complete information and questions, call [phone] or In Basket pool, fv home infusion ([id number])  CSN Number:  [id number]

## 2020-10-23 ENCOUNTER — DOCUMENTATION ONLY (OUTPATIENT)
Dept: PHARMACY | Facility: CLINIC | Age: 35
End: 2020-10-23

## 2020-10-23 ENCOUNTER — HOME INFUSION (PRE-WILLOW HOME INFUSION) (OUTPATIENT)
Dept: PHARMACY | Facility: CLINIC | Age: 35
End: 2020-10-23

## 2020-10-23 NOTE — PROGRESS NOTES
Skilled Nurse visit in the [facility] Infusion Suite to administer Remicade 700mg .  No recent elevated temperature, fever, chills, productive cough, coughing for 3 weeks or longer or hemoptysis, abnormal vital signs, night sweats, chest pain. No  decrease in your appetite, unexplained weight loss or fatigue.  No other new onset medical symptoms.  Current weight 335lb.  PIV placed LFA, 1 attempt.  Pre medicated with Solumedrol. Labs drawn none. Infusion completed without complication or reaction. Pt reports therapy is effective in managing symptoms related to therapy.  Sherrie Coleman RN  [facility]  [email or web address]  [phone]

## 2020-10-23 NOTE — PROGRESS NOTES
This is a recent snapshot of the patient's [facility] medical record.  For current drug dose and complete information and questions, call [phone] or In Basket pool, fv home infusion ([id number])  CSN Number:  [id number]

## 2020-10-26 NOTE — PROGRESS NOTES
This is a recent snapshot of the patient's [facility] medical record.  For current drug dose and complete information and questions, call [phone] or In Basket pool, fv home infusion ([id number])  CSN Number:  [id number]

## 2020-11-19 ENCOUNTER — HOME INFUSION (PRE-WILLOW HOME INFUSION) (OUTPATIENT)
Dept: PHARMACY | Facility: CLINIC | Age: 35
End: 2020-11-19

## 2020-11-20 NOTE — PROGRESS NOTES
This is a recent snapshot of the patient's [facility] medical record.  For current drug dose and complete information and questions, call [phone] or In Basket pool, fv home infusion ([id number])  CSN Number:  [id number]

## 2020-11-23 ENCOUNTER — VIRTUAL VISIT (OUTPATIENT)
Dept: PSYCHOLOGY | Facility: CLINIC | Age: 35
End: 2020-11-23
Payer: COMMERCIAL

## 2020-11-23 DIAGNOSIS — F41.1 GAD (GENERALIZED ANXIETY DISORDER): Primary | ICD-10-CM

## 2020-11-23 DIAGNOSIS — F33.0 MDD (MAJOR DEPRESSIVE DISORDER), RECURRENT EPISODE, MILD (H): ICD-10-CM

## 2020-11-23 PROCEDURE — 90837 PSYTX W PT 60 MINUTES: CPT | Mod: GT | Performed by: SOCIAL WORKER

## 2020-11-23 ASSESSMENT — COLUMBIA-SUICIDE SEVERITY RATING SCALE - C-SSRS
6. HAVE YOU EVER DONE ANYTHING, STARTED TO DO ANYTHING, OR PREPARED TO DO ANYTHING TO END YOUR LIFE?: NO
TOTAL  NUMBER OF ABORTED OR SELF INTERRUPTED ATTEMPTS PAST 3 MONTHS: NO
2. HAVE YOU ACTUALLY HAD ANY THOUGHTS OF KILLING YOURSELF?: NO
TOTAL  NUMBER OF ABORTED OR SELF INTERRUPTED ATTEMPTS PAST LIFETIME: NO
ATTEMPT PAST THREE MONTHS: NO
1. IN THE PAST MONTH, HAVE YOU WISHED YOU WERE DEAD OR WISHED YOU COULD GO TO SLEEP AND NOT WAKE UP?: NO
2. HAVE YOU ACTUALLY HAD ANY THOUGHTS OF KILLING YOURSELF LIFETIME?: YES
1. IN THE PAST MONTH, HAVE YOU WISHED YOU WERE DEAD OR WISHED YOU COULD GO TO SLEEP AND NOT WAKE UP?: YES
5. HAVE YOU STARTED TO WORK OUT OR WORKED OUT THE DETAILS OF HOW TO KILL YOURSELF? DO YOU INTEND TO CARRY OUT THIS PLAN?: NO
ATTEMPT LIFETIME: NO
3. HAVE YOU BEEN THINKING ABOUT HOW YOU MIGHT KILL YOURSELF?: NO
6. HAVE YOU EVER DONE ANYTHING, STARTED TO DO ANYTHING, OR PREPARED TO DO ANYTHING TO END YOUR LIFE?: NO
5. HAVE YOU STARTED TO WORK OUT OR WORKED OUT THE DETAILS OF HOW TO KILL YOURSELF? DO YOU INTEND TO CARRY OUT THIS PLAN?: NO
TOTAL  NUMBER OF INTERRUPTED ATTEMPTS LIFETIME: NO
4. HAVE YOU HAD THESE THOUGHTS AND HAD SOME INTENTION OF ACTING ON THEM?: NO
4. HAVE YOU HAD THESE THOUGHTS AND HAD SOME INTENTION OF ACTING ON THEM?: NO
TOTAL  NUMBER OF INTERRUPTED ATTEMPTS PAST 3 MONTHS: NO

## 2020-11-23 ASSESSMENT — ANXIETY QUESTIONNAIRES
IF YOU CHECKED OFF ANY PROBLEMS ON THIS QUESTIONNAIRE, HOW DIFFICULT HAVE THESE PROBLEMS MADE IT FOR YOU TO DO YOUR WORK, TAKE CARE OF THINGS AT HOME, OR GET ALONG WITH OTHER PEOPLE: NOT DIFFICULT AT ALL
2. NOT BEING ABLE TO STOP OR CONTROL WORRYING: NEARLY EVERY DAY
3. WORRYING TOO MUCH ABOUT DIFFERENT THINGS: NEARLY EVERY DAY
GAD7 TOTAL SCORE: 18
7. FEELING AFRAID AS IF SOMETHING AWFUL MIGHT HAPPEN: NEARLY EVERY DAY
6. BECOMING EASILY ANNOYED OR IRRITABLE: NEARLY EVERY DAY
5. BEING SO RESTLESS THAT IT IS HARD TO SIT STILL: NOT AT ALL
1. FEELING NERVOUS, ANXIOUS, OR ON EDGE: NEARLY EVERY DAY

## 2020-11-23 ASSESSMENT — PATIENT HEALTH QUESTIONNAIRE - PHQ9
SUM OF ALL RESPONSES TO PHQ QUESTIONS 1-9: 13
5. POOR APPETITE OR OVEREATING: NEARLY EVERY DAY
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
## 2020-11-24 ASSESSMENT — ANXIETY QUESTIONNAIRES: GAD7 TOTAL SCORE: 18

## 2020-11-30 ENCOUNTER — HOME INFUSION (PRE-WILLOW HOME INFUSION) (OUTPATIENT)
Dept: PHARMACY | Facility: CLINIC | Age: 35
End: 2020-11-30

## 2020-11-30 ENCOUNTER — VIRTUAL VISIT (OUTPATIENT)
Dept: PSYCHOLOGY | Facility: CLINIC | Age: 35
End: 2020-11-30
Payer: COMMERCIAL

## 2020-11-30 DIAGNOSIS — F33.1 MDD (MAJOR DEPRESSIVE DISORDER), RECURRENT EPISODE, MODERATE (H): ICD-10-CM

## 2020-11-30 DIAGNOSIS — F41.1 GAD (GENERALIZED ANXIETY DISORDER): Primary | ICD-10-CM

## 2020-11-30 PROCEDURE — 90791 PSYCH DIAGNOSTIC EVALUATION: CPT | Mod: GT | Performed by: SOCIAL WORKER
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
## 2020-12-01 NOTE — PROGRESS NOTES
This is a recent snapshot of the patient's [facility] medical record.  For current drug dose and complete information and questions, call [phone] or In Basket pool, [facility] ([id number])  CSN Number:  [id number]

## 2020-12-03 ENCOUNTER — OFFICE VISIT (OUTPATIENT)
Dept: DERMATOLOGY | Facility: CLINIC | Age: 35
End: 2020-12-03
Payer: COMMERCIAL

## 2020-12-03 DIAGNOSIS — Z79.899 ENCOUNTER FOR LONG-TERM (CURRENT) USE OF HIGH-RISK MEDICATION: ICD-10-CM

## 2020-12-03 DIAGNOSIS — L73.2 HIDRADENITIS SUPPURATIVA: Primary | ICD-10-CM

## 2020-12-03 PROCEDURE — 99214 OFFICE O/P EST MOD 30 MIN: CPT | Mod: GC | Performed by: DERMATOLOGY

## 2020-12-03 RX ORDER — PREDNISONE 10 MG/1
TABLET ORAL
Qty: 84 TABLET | Refills: 0 | Status: SHIPPED | OUTPATIENT
Start: 2020-12-03 | End: 2020-12-27

## 2020-12-03 ASSESSMENT — PAIN SCALES - GENERAL: PAINLEVEL: NO PAIN (0)

## 2020-12-03 NOTE — LETTER
Date:December 11, 2020      Patient was self referred, no letter generated. Do not send.        [facility] Health Information

## 2020-12-03 NOTE — PROGRESS NOTES
[facility] Dermatology Note      Dermatology Problem List:  1. Hidradenitis suppurativa.  - current recommended tx: infliximab [id number] with 40mg methylpred  - Patient due for next TB Quantiferon test in August of 2021  - Labs done 8/2020  -past tx: has been on ABx for acne (incl Bactrim), clindamycin, rifampin, prednisone, bactrim, methotrexate 15mg (discontinued Summer 2020 due to elevated LFTs)  2. Acne vulgaris  -Has tried Accutane and a number of antibiotics  3. Hx of atypical nevus - ~2005 - removed in [place] -patient will bring us records at next visit    Encounter Date: Dec 3, 2020    CC:   Chief Complaint   Patient presents with     Derm Problem     Jarrod is following up for HS, states his legs and groin area clear, but his left axilla and neck are still active.        History of Present Illness:  Mr. Margarito Sofia is a 35 year old male who presents as a follow-up for HS. The patient was last seen 8/2020 when he was continued on remicade q5 weeks, 1 hr rapid infusions and received ILK injections.    He's recovering from COVID-19, which he caught last month. He's remains severely fatigued, but denies dyspnea with exertion. He held his remicade due to this illness. His last infusion was 10/23/20. His HS has been stable thus far.    His most active areas are on the posterior neck and bilateral axillae. He's been flaring more for the past 2 weeks.The posterior neck became so painful he had to have it drained at Urgent Care back in October. He took a 7 day course of Bactrim. These areas have calmed down. He still has drainage in his L armpit and posterior neck. He uses surgical pads with paper tape when the drainage is bad. He is not using hibiclens, it did not help. He uses regular soap. He does not use any other topicals. Overall, he tolerates his infusions. He gets headaches a couple days after the infusions, but then they resolved. He gets pre-meds. His groin and legs are clear. He  is happy with this treatment plan and wants to continue.      Past Medical History:   Patient Active Problem List   Diagnosis     Hidradenitis suppurativa     Social History:  Patient reports that he has never smoked. He has never used smokeless tobacco.    Family History:  Family History   Problem Relation Age of Onset     Skin Cancer No family hx of      Melanoma No family hx of        Medications:  Current Outpatient Medications   Medication Sig Dispense Refill     fluorometholone (FML LIQUIFILM) 0.1 % ophthalmic suspension SHAKE LQ AND INT 1 GTT IN OU D  5     predniSONE (DELTASONE) 10 MG tablet Take 6 tablets (60 mg) by mouth daily for 4 days, THEN 5 tablets (50 mg) daily for 4 days, THEN 4 tablets (40 mg) daily for 4 days, THEN 3 tablets (30 mg) daily for 4 days, THEN 2 tablets (20 mg) daily for 4 days, THEN 1 tablet (10 mg) daily for 4 days. 84 tablet 0     chlorhexidine (HIBICLENS) 4 % liquid Wash boil-prone areas three times weekly while in shower          No Known Allergies      Review of Systems:  - As per HPI  - Constitutional: Otherwise feeling well today, in usual state of health.  - HEENT: Patient denies nonhealing oral sores.  - Skin: As above in HPI. No additional skin concerns.    Physical exam:  GEN: This is a well developed, well-nourished obese male in no acute distress, in a pleasant mood.    SKIN: Focused examination of the face, neck, axillae, and lower extremities was performed.  - Nolan skin type: II  - see full skin exam exam below  - No other lesions of concern on areas examined.     HS Exam  Head/Neck 12/3/2020   # of inflamed nodules 1   # of abcesses 0   # of draining tunnels 0   Erythema (grade) 2   Thickness (grade) 2   Open skin surface (grade) 0   Tunnels (grade) 0       Left Axilla 12/3/2020   # of inflamed nodules 2   # of abcesses 1   # of draining tunnels 1   Erythema (grade) 2   Thickness (grade) 2   Open skin surface (grade) 1   Tunnels (grade) 1       Right Axilla  12/3/2020   # of inflamed nodules 5   # of abcesses 0   # of draining tunnels 0   Erythema (grade) 2   Thickness (grade) 1   Open skin surface (grade) 0   Tunnels (grade) 0       No flowsheet data found.    No flowsheet data found.     Pubis/Genital 12/3/2020   # of inflamed nodules 3   # of abcesses 0   # of draining tunnels 0   Erythema (grade) 2   Thickness (grade) 1   Open skin surface (grade) 0   Tunnels (grade) 0       Left Thigh 12/3/2020   # of inflamed nodules 1   # of abcesses 0   # of draining tunnels 0   Erythema (grade) 1   Thickness (grade) 1   Open skin surface (grade) 0   Tunnels (grade) 0       No flowsheet data found.    No flowsheet data found.    No flowsheet data found.    HS Data  HS Exam Data 12/3/2020   LC Type LC1   Clinical Subtypes Regular type   Total Rodney Stage II   Total Inflammatory Nodules 12   Total Abcesses 1   Total Draining Tunnels 1   Total Abscess and Nodule Count 13   IHS4 Score  18   Total  HASI Score 48               Impression/Plan:    1. Hidradenitis suppurativa, Rodney Stage II  Currently is having a minor flare in the L axillae and posterior neck, as well as the suprapubic region. Has not received Remicade since late Oct 2020 due to recent COVID-19 infection, now flaring off med. He has since recovered from his illness and would like to resume, the medication is tolerated well with some post-infusion headaches. He has not had much of a flare in the groin/legs. Not using topicals.    - Continue infliximab 5 mg/kg q5 weeks rapid 1 hour infusions with 40 mg methylpred pre-medication. Has headaches as side effect, but these self resolve within 2 days with prn NSAIDs.No repeat loading dose needed  - No injections needed today  - Continue regular cleanser in shower, OK to hold off hibiclens/BPO  - Will give 24 day prednisone taper to bridge him until his next infusion  - Labs due at next visit: CBC diff, Hepatic panel (orders placed)  - Discussed HS surgery for recalcitrant  tunnels in the L axillae, he is open to this if not improved with resumed Remicade       CC Referred Self, MD  No address on file on close of this encounter.      Follow-up in 3 months with Madeline Peoples PA-C, earlier for new or changing lesions.     Dr. Denny staffed the patient.    Katelin Franco MD  PGY-4 Medicine-Dermatology  Pager [phone]      Staff Involved:  Resident(Katelin Franco MD)/Staff(as above)    Staff Physician Comments:   I saw and evaluated the patient with the resident and I agree with the assessment and plan.  I was present for the examination.    Dennis Denny MD, FAAD    Departments of Internal Medicine and Dermatology  [facility]  [phone]

## 2020-12-03 NOTE — LETTER
12/3/2020       RE: Margarito Sofia  [address]  [place] [address]     Dear Colleague,    Thank you for referring your patient, Margarito Sofia, to the [facility] [place] at [facility]. Please see a copy of my visit note below.    [facility] Dermatology Note      Dermatology Problem List:  1. Hidradenitis suppurativa.  - current recommended tx: infliximab [id number] with 40mg methylpred  - Patient due for next TB Quantiferon test in August of 2021  - Labs done 8/2020  -past tx: has been on ABx for acne (incl Bactrim), clindamycin, rifampin, prednisone, bactrim, methotrexate 15mg (discontinued Summer 2020 due to elevated LFTs)  2. Acne vulgaris  -Has tried Accutane and a number of antibiotics  3. Hx of atypical nevus - ~2005 - removed in [place] -patient will bring us records at next visit    Encounter Date: Dec 3, 2020    CC:   Chief Complaint   Patient presents with     Derm Problem     Jarrod is following up for HS, states his legs and groin area clear, but his left axilla and neck are still active.        History of Present Illness:  Mr. Margarito Sofia is a 35 year old male who presents as a follow-up for HS. The patient was last seen 8/2020 when he was continued on remicade q5 weeks, 1 hr rapid infusions and received ILK injections.    He's recovering from COVID-19, which he caught last month. He's remains severely fatigued, but denies dyspnea with exertion. He held his remicade due to this illness. His last infusion was 10/23/20. His HS has been stable thus far.    His most active areas are on the posterior neck and bilateral axillae. He's been flaring more for the past 2 weeks.The posterior neck became so painful he had to have it drained at Urgent Care back in October. He took a 7 day course of Bactrim. These areas have calmed down. He still has drainage in his L armpit and posterior neck. He uses  surgical pads with paper tape when the drainage is bad. He is not using hibiclens, it did not help. He uses regular soap. He does not use any other topicals. Overall, he tolerates his infusions. He gets headaches a couple days after the infusions, but then they resolved. He gets pre-meds. His groin and legs are clear. He is happy with this treatment plan and wants to continue.      Past Medical History:   Patient Active Problem List   Diagnosis     Hidradenitis suppurativa     Social History:  Patient reports that he has never smoked. He has never used smokeless tobacco.    Family History:  Family History   Problem Relation Age of Onset     Skin Cancer No family hx of      Melanoma No family hx of        Medications:  Current Outpatient Medications   Medication Sig Dispense Refill     fluorometholone (FML LIQUIFILM) 0.1 % ophthalmic suspension SHAKE LQ AND INT 1 GTT IN OU D  5     predniSONE (DELTASONE) 10 MG tablet Take 6 tablets (60 mg) by mouth daily for 4 days, THEN 5 tablets (50 mg) daily for 4 days, THEN 4 tablets (40 mg) daily for 4 days, THEN 3 tablets (30 mg) daily for 4 days, THEN 2 tablets (20 mg) daily for 4 days, THEN 1 tablet (10 mg) daily for 4 days. 84 tablet 0     chlorhexidine (HIBICLENS) 4 % liquid Wash boil-prone areas three times weekly while in shower          No Known Allergies      Review of Systems:  - As per HPI  - Constitutional: Otherwise feeling well today, in usual state of health.  - HEENT: Patient denies nonhealing oral sores.  - Skin: As above in HPI. No additional skin concerns.    Physical exam:  GEN: This is a well developed, well-nourished obese male in no acute distress, in a pleasant mood.    SKIN: Focused examination of the face, neck, axillae, and lower extremities was performed.  - Nolan skin type: II  - see full skin exam exam below  - No other lesions of concern on areas examined.     HS Exam  Head/Neck 12/3/2020   # of inflamed nodules 1   # of abcesses 0   # of  draining tunnels 0   Erythema (grade) 2   Thickness (grade) 2   Open skin surface (grade) 0   Tunnels (grade) 0       Left Axilla 12/3/2020   # of inflamed nodules 2   # of abcesses 1   # of draining tunnels 1   Erythema (grade) 2   Thickness (grade) 2   Open skin surface (grade) 1   Tunnels (grade) 1       Right Axilla 12/3/2020   # of inflamed nodules 5   # of abcesses 0   # of draining tunnels 0   Erythema (grade) 2   Thickness (grade) 1   Open skin surface (grade) 0   Tunnels (grade) 0       No flowsheet data found.    No flowsheet data found.     Pubis/Genital 12/3/2020   # of inflamed nodules 3   # of abcesses 0   # of draining tunnels 0   Erythema (grade) 2   Thickness (grade) 1   Open skin surface (grade) 0   Tunnels (grade) 0       Left Thigh 12/3/2020   # of inflamed nodules 1   # of abcesses 0   # of draining tunnels 0   Erythema (grade) 1   Thickness (grade) 1   Open skin surface (grade) 0   Tunnels (grade) 0       No flowsheet data found.    No flowsheet data found.    No flowsheet data found.    HS Data  HS Exam Data 12/3/2020   LC Type LC1   Clinical Subtypes Regular type   Total Rodney Stage II   Total Inflammatory Nodules 12   Total Abcesses 1   Total Draining Tunnels 1   Total Abscess and Nodule Count 13   IHS4 Score  18   Total  HASI Score 48               Impression/Plan:    1. Hidradenitis suppurativa, Rodney Stage II  Currently is having a minor flare in the L axillae and posterior neck, as well as the suprapubic region. Has not received Remicade since late Oct 2020 due to recent COVID-19 infection, now flaring off med. He has since recovered from his illness and would like to resume, the medication is tolerated well with some post-infusion headaches. He has not had much of a flare in the groin/legs. Not using topicals.    - Continue infliximab 5 mg/kg q5 weeks rapid 1 hour infusions with 40 mg methylpred pre-medication. Has headaches as side effect, but these self resolve within 2 days with  prn NSAIDs.No repeat loading dose needed  - No injections needed today  - Continue regular cleanser in shower, OK to hold off hibiclens/BPO  - Will give 24 day prednisone taper to bridge him until his next infusion  - Labs due at next visit: CBC diff, Hepatic panel (orders placed)  - Discussed HS surgery for recalcitrant tunnels in the L axillae, he is open to this if not improved with resumed Remicade       CC Referred Self, MD  No address on file on close of this encounter.      Follow-up in 3 months with Madeline Peoples PA-C, earlier for new or changing lesions.     Dr. Denny staffed the patient.    Katelin Franco MD  PGY-4 Medicine-Dermatology  Pager [phone]      Staff Involved:  Resident(Katelin Franco MD)/Staff(as above)    Staff Physician Comments:   I saw and evaluated the patient with the resident and I agree with the assessment and plan.  I was present for the examination.    Dennis Denny MD, FAAD    Departments of Internal Medicine and Dermatology  [facility]  [phone]            Again, thank you for allowing me to participate in the care of your patient.      Sincerely,    Dennis Denny MD

## 2020-12-03 NOTE — NURSING NOTE
Dermatology Rooming Note    Margarito Sofia's goals for this visit include:   Chief Complaint   Patient presents with     Derm Problem     Jarrod is following up for HS, states his legs and groin area clear, but his left axilla and neck are still active.        Deandra Esteves, FRANCISN

## 2020-12-09 ENCOUNTER — HOME INFUSION (PRE-WILLOW HOME INFUSION) (OUTPATIENT)
Dept: PHARMACY | Facility: CLINIC | Age: 35
End: 2020-12-09

## 2020-12-10 ENCOUNTER — HOME INFUSION (PRE-WILLOW HOME INFUSION) (OUTPATIENT)
Dept: PHARMACY | Facility: CLINIC | Age: 35
End: 2020-12-10

## 2020-12-11 ENCOUNTER — HOME INFUSION (PRE-WILLOW HOME INFUSION) (OUTPATIENT)
Dept: PHARMACY | Facility: CLINIC | Age: 35
End: 2020-12-11

## 2020-12-11 ENCOUNTER — DOCUMENTATION ONLY (OUTPATIENT)
Dept: PHARMACY | Facility: CLINIC | Age: 35
End: 2020-12-11

## 2020-12-11 NOTE — PROGRESS NOTES
This is a recent snapshot of the patient's [facility] medical record.  For current drug dose and complete information and questions, call [phone] or In Basket pool, [facility]v home infusion ([id number])  CSN Number:  [id number]

## 2020-12-11 NOTE — PROGRESS NOTES
Skilled Nurse visit in the [facility] Infusion Suite to administer Remicade.  No recent elevated temperature, fever, chills, productive cough,  hemoptysis, abnormal vital signs, night sweats, chest pain. No decrease in appetite, unexplained weight loss or fatigue.  No other new onset medical symptoms.  Current weight 335.  PIV placed right arm, 1 attempt.  Pre medicated with methylprednisolone 40mg IV. Infusion completed without complication or reaction. Pt reports therapy is usually effective in managing symptoms related to therapy, although he had a flare up a couple weeks ago, but was 2 weeks late for infusion schedule due to having COVID-19.    Enoch Fritz RN  [facility]  [email or web address]

## 2020-12-11 NOTE — PROGRESS NOTES
This is a recent snapshot of the patient's [facility] medical record.  For current drug dose and complete information and questions, call [phone] or In Basket pool, fv home infusion ([id number])  CSN Number:  [id number]

## 2020-12-14 NOTE — PROGRESS NOTES
This is a recent snapshot of the patient's [facility] medical record.  For current drug dose and complete information and questions, call [phone] or In Basket pool, fv home infusion ([id number])  CSN Number:  [id number]

## 2020-12-27 ENCOUNTER — HEALTH MAINTENANCE LETTER (OUTPATIENT)
Age: 35
End: 2020-12-27

## 2021-01-13 ENCOUNTER — HOME INFUSION (PRE-WILLOW HOME INFUSION) (OUTPATIENT)
Dept: PHARMACY | Facility: CLINIC | Age: 36
End: 2021-01-13

## 2021-01-14 NOTE — PROGRESS NOTES
This is a recent snapshot of the patient's [facility] medical record.  For current drug dose and complete information and questions, call [phone] or In Basket pool, fv home infusion ([id number])  CSN Number:  [id number]

## 2021-01-15 ENCOUNTER — HOME INFUSION (PRE-WILLOW HOME INFUSION) (OUTPATIENT)
Dept: PHARMACY | Facility: CLINIC | Age: 36
End: 2021-01-15

## 2021-01-18 NOTE — PROGRESS NOTES
This is a recent snapshot of the patient's [facility] medical record.  For current drug dose and complete information and questions, call [phone] or In Basket pool, fv home infusion ([id number])  CSN Number:  [id number]

## 2021-02-17 ENCOUNTER — HOME INFUSION (PRE-WILLOW HOME INFUSION) (OUTPATIENT)
Dept: PHARMACY | Facility: CLINIC | Age: 36
End: 2021-02-17

## 2021-02-19 ENCOUNTER — DOCUMENTATION ONLY (OUTPATIENT)
Dept: PHARMACY | Facility: CLINIC | Age: 36
End: 2021-02-19

## 2021-02-19 ENCOUNTER — HOME INFUSION (PRE-WILLOW HOME INFUSION) (OUTPATIENT)
Dept: PHARMACY | Facility: CLINIC | Age: 36
End: 2021-02-19

## 2021-02-19 NOTE — PROGRESS NOTES
Skilled Nurse visit in the /[facility] Infusion Suite to administer Remicade 700mg  .  No recent elevated temperature, fever, chills, productive cough, coughing for 3 weeks or longer or hemoptysis, abnormal vital signs, night sweats, chest pain. No  decrease in your appetite, unexplained weight loss or fatigue.  No other new onset medical symptoms.  Current weight 330lb.  PIV placed LFA, 1 attempt.  Pre medicated with Solumedrol 40mg IVpush. Labs drawn none. Infusion completed without complication or reaction. Pt reports therapy is effective in managing symptoms related to therapy.  Sherrie Coleman RN  [facility]  [email or web address]  [phone]

## 2021-02-19 NOTE — PROGRESS NOTES
This is a recent snapshot of the patient's [facility] medical record.  For current drug dose and complete information and questions, call [phone] or In Basket pool, fv home infusion ([id number])  CSN Number:  [id number]

## 2021-02-22 NOTE — PROGRESS NOTES
This is a recent snapshot of the patient's [facility] medical record.  For current drug dose and complete information and questions, call [phone] or In Basket pool, fv home infusion ([id number])  CSN Number:  [id number]

## 2021-03-04 ENCOUNTER — OFFICE VISIT (OUTPATIENT)
Dept: DERMATOLOGY | Facility: CLINIC | Age: 36
End: 2021-03-04
Payer: COMMERCIAL

## 2021-03-04 DIAGNOSIS — L73.2 HIDRADENITIS SUPPURATIVA: Primary | ICD-10-CM

## 2021-03-04 DIAGNOSIS — Z79.899 ENCOUNTER FOR LONG-TERM (CURRENT) USE OF HIGH-RISK MEDICATION: ICD-10-CM

## 2021-03-04 PROCEDURE — 99213 OFFICE O/P EST LOW 20 MIN: CPT | Mod: 25 | Performed by: PHYSICIAN ASSISTANT

## 2021-03-04 PROCEDURE — 11900 INJECT SKIN LESIONS </W 7: CPT | Performed by: PHYSICIAN ASSISTANT

## 2021-03-04 RX ORDER — INFLIXIMAB 100 MG/10ML
700 INJECTION, POWDER, LYOPHILIZED, FOR SOLUTION INTRAVENOUS
COMMUNITY

## 2021-03-04 ASSESSMENT — PAIN SCALES - GENERAL: PAINLEVEL: NO PAIN (0)

## 2021-03-04 NOTE — NURSING NOTE
Dermatology Rooming Note    Margarito Sofia's goals for this visit include:   Chief Complaint   Patient presents with     Derm Problem     doug is coming in today for a 3 month follow up on HS, states that his neck is better but the arm has not gotten any better     Stephanie Galloway CMA on 3/4/2021 at 11:10 AM

## 2021-03-04 NOTE — PROGRESS NOTES
[facility] Dermatology Note  Encounter Date: Mar 4, 2021  Office Visit      Dermatology Problem List:  1. Hidradenitis suppurativa.  - current recommended tx: infliximab [id number] with 40mg methylpred, ILK to two sites 3/4/21  - Patient due for next TB Quantiferon test in August of 2021  - Labs done 8/2020  -past tx: has been on ABx for acne (incl Bactrim), clindamycin, rifampin, prednisone, bactrim, methotrexate 15mg (discontinued Summer 2020 due to elevated LFTs)  2. Acne vulgaris  -Has tried Accutane and a number of antibiotics  3. Hx of atypical nevus - ~2005 - removed in [place] -patient will bring us records at next visit  ____________________________________________    Assessment & Plan:     1. Hidradenitis suppurativa, Rodney Stage II  Currently is having a minor flare in the L axillae and posterior neck. Has resumed Remicade and is tolerating it well. Overall he is stable to improved. He tolerates Remicade well with some post-infusion headaches. He has not had much of a flare in the groin/legs. Not using topicals at this time. Only two areas that remain are the posterior neck and the L axilla, today they are still active, but overall much impoved.  - Continue infliximab 5 mg/kg q5 weeks rapid 1 hour infusions with 40 mg methylpred pre-medication. Has headaches as side effect, but these self resolve within 2 days with prn NSAIDs.No repeat loading dose needed  - procedure (see below)  - Continue regular cleanser in shower, OK to hold off hibiclens/BPO  - Labs due: CBC diff, Hepatic panel (orders placed), TB not due until August 2021  - Previously discussed HS surgery for recalcitrant tunnels in the L axillae, he is open to this if not improved with resumed Remicade, but he is not yet interested in this currently and wants to see how the two sites respond to the ILK today     Procedures Performed:   - Intra-lesional triamcinolone procedure note. After positioning and cleansing with  isopropyl alcohol, 1 total mL of triamcinolone 10mg/mL was injected into 2 lesion(s) on the posterior neck and L axilla. The patient tolerated the procedure well and left the dermatology clinic in good condition.     Follow-up: 3 month(s) in-person, or earlier for new or changing lesions    Staff:     All risks, benefits and alternatives were discussed with patient.  Patient is in agreement and understands the assessment and plan.  All questions were answered.    Madeline Peoples PA-C, MPAS  [facility]: Phone: [phone], Fax: [phone]  [facility]: Phone: [phone],  Fax: [phone]  ____________________________________________    CC: Derm Problem (doug is coming in today for a 3 month follow up on HS, states that his neck is better but the arm has not gotten any better)      HPI:  Mr. Margarito Sofia is a 35 year old male who presents today as a return patient for HS.    Notes L underarm still inflamed as well as the posterior of the neck, however neck has improved. Resumed Remicade, has had infusions 3x since holding 2/2 COVID in general stable, but those two areas continue to be problematic. Is getting active draining out of the L under arm. He did take the 24 day prednisone taper prescribed by Dr. Denny last visit, notes the L axillary area shruk as well as the neck, but neither are cleared completely. The two area are now more inflamed and larger than they were on prednisone, but overall still better than from baseline.     Patient is otherwise feeling well, without additional concerns.    Labs:  Repeat CBC and CMP tofau    Physical Exam:  Vitals: There were no vitals taken for this visit.  SKIN: Waist-up skin, which includes the head/face, neck, both arms, chest, back, abdomen, digits and/or nails was examined.   - inflammatory nodule on the posterior neck, no drainage  - L axilla, mildly tender, draining  nodule   - No other lesions of concern on areas examined.     Medications:  Current Outpatient Medications   Medication     fluorometholone (FML LIQUIFILM) 0.1 % ophthalmic suspension     inFLIXimab (REMICADE) 100 MG injection     chlorhexidine (HIBICLENS) 4 % liquid     Current Facility-Administered Medications   Medication     lidocaine 1% with EPINEPHrine 1:100,000 injection 3 mL     triamcinolone acetonide (KENALOG-10) injection 10 mg      Past Medical/Surgical History:   Patient Active Problem List   Diagnosis     Hidradenitis suppurativa     No past medical history on file.    CC Dr. Mata and Dr. Denny on close of this encounter.

## 2021-03-04 NOTE — LETTER
3/4/2021       RE: Margarito Sofia  [address]  [facility] [address]     Dear Colleague,    Thank you for referring your patient, Margarito Sofia, to the [facility] DERMATOLOGY CLINIC [place] at [facility]. Please see a copy of my visit note below.    [facility] Dermatology Note  Encounter Date: Mar 4, 2021  Office Visit      Dermatology Problem List:  1. Hidradenitis suppurativa.  - current recommended tx: infliximab h5ramlt with 40mg methylpred, ILK to two sites 3/4/21  - Patient due for next TB Quantiferon test in August of 2021  - Labs done 8/2020  -past tx: has been on ABx for acne (incl Bactrim), clindamycin, rifampin, prednisone, bactrim, methotrexate 15mg (discontinued Summer 2020 due to elevated LFTs)  2. Acne vulgaris  -Has tried Accutane and a number of antibiotics  3. Hx of atypical nevus - ~2005 - removed in [place] -patient will bring us records at next visit  ____________________________________________    Assessment & Plan:     1. Hidradenitis suppurativa, Rodney Stage II  Currently is having a minor flare in the L axillae and posterior neck. Has resumed Remicade and is tolerating it well. Overall he is stable to improved. He tolerates Remicade well with some post-infusion headaches. He has not had much of a flare in the groin/legs. Not using topicals at this time. Only two areas that remain are the posterior neck and the L axilla, today they are still active, but overall much impoved.  - Continue infliximab 5 mg/kg q5 weeks rapid 1 hour infusions with 40 mg methylpred pre-medication. 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 After positioning and cleansing with isopropyl alcohol, 1 total mL of triamcinolone 10mg/mL was injected into 2 lesion(s) on the posterior neck and L axilla. The patient tolerated the procedure well and left the dermatology clinic in good condition.     Follow-up: 3 month(s) in-person, or earlier for new or changing lesions    Staff:     All risks, benefits and alternatives were discussed with patient.  Patient is in agreement and understands the assessment and plan.  All questions were answered.    Madeline Peoples PA-C, [facility]  [facility] [place]: Phone: [phone], Fax: [phone]  [facility]: Phone: [phone],  Fax: [phone]  ____________________________________________    CC: Derm Problem (doug is coming in today for a 3 month follow up on HS, states that his neck is better but the arm has not gotten any better)      HPI:  Mr. Margarito Sofia is a 35 year old male who presents today as a return patient for HS.    Notes L underarm still inflamed as well as the posterior of the neck, however neck has improved. Resumed Remicade, has had infusions 3x since holding 2/2 COVID in general stable, but those two areas continue to be problematic. Is getting active draining out of the L under arm. He did take the 24 day prednisone taper prescribed by Dr. Denny last visit, notes the L axillary area shruk as well as the neck, but neither are cleared completely. The two area are now more inflamed and larger than they were on prednisone, but overall still better than from baseline.     Patient is otherwise feeling well, without additional  concerns.    Labs:  Repeat CBC and CMP tofau    Physical Exam:  Vitals: There were no vitals taken for this visit.  SKIN: Waist-up skin, which includes the head/face, neck, both arms, chest, back, abdomen, digits and/or nails was examined.   - inflammatory nodule on the posterior neck, no drainage  - L axilla, mildly tender, draining nodule   - No other lesions of concern on areas examined.     Medications:  Current Outpatient Medications   Medication     fluorometholone (FML LIQUIFILM) 0.1 % ophthalmic suspension     inFLIXimab (REMICADE) 100 MG injection     chlorhexidine (HIBICLENS) 4 % liquid     Current Facility-Administered Medications   Medication     lidocaine 1% with EPINEPHrine 1:100,000 injection 3 mL     triamcinolone acetonide (KENALOG-10) injection 10 mg      Past Medical/Surgical History:   Patient Active Problem List   Diagnosis     Hidradenitis suppurativa     No past medical history on file.    CC Dr. Mata and Dr. Denny on close of this encounter.

## 2021-03-04 NOTE — NURSING NOTE
Drug Administration Record    Prior to injection, verified patient identity using patient's name and date of birth.  Due to injection administration, patient instructed to remain in clinic for 15 minutes  afterwards, and to report any adverse reaction to me immediately.    Drug Name: triamcinolone acetonide(kenalog)  Dose: 1mL of triamcinolone 10mg/mL, 10mg dose  Route administered: ID  NDC #: Kenalog-10 (4179-7933-39)  Amount of waste(mL):4mL  Reason for waste: Multi dose vial    LOT #: [id number]  SITE: neck  : [employer]  EXPIRATION DATE: 4/2022

## 2021-03-23 ENCOUNTER — HOME INFUSION (PRE-WILLOW HOME INFUSION) (OUTPATIENT)
Dept: PHARMACY | Facility: CLINIC | Age: 36
End: 2021-03-23

## 2021-03-24 ENCOUNTER — HOME INFUSION (PRE-WILLOW HOME INFUSION) (OUTPATIENT)
Dept: PHARMACY | Facility: CLINIC | Age: 36
End: 2021-03-24

## 2021-03-24 NOTE — PROGRESS NOTES
This is a recent snapshot of the patient's [facility] medical record.  For current drug dose and complete information and questions, call [phone] or In [facility] pool, fv home infusion ([id number])  CSN Number:  [id number]

## 2021-03-26 ENCOUNTER — HOME INFUSION (PRE-WILLOW HOME INFUSION) (OUTPATIENT)
Dept: PHARMACY | Facility: CLINIC | Age: 36
End: 2021-03-26

## 2021-03-26 ENCOUNTER — DOCUMENTATION ONLY (OUTPATIENT)
Dept: PHARMACY | Facility: CLINIC | Age: 36
End: 2021-03-26

## 2021-03-26 NOTE — PROGRESS NOTES
This is a recent snapshot of the patient's [facility] medical record.  For current drug dose and complete information and questions, call [phone] or In Basket pool, fv home infusion ([id number])  CSN Number:  [id number]

## 2021-03-27 NOTE — PROGRESS NOTES
Skilled Nurse visit in the [facility] Infusion Suite to administer Remicade 700 mg IV. No recent elevated temperature, fever, chills, productive cough, coughing for 3 weeks or longer or hemoptysis, abnormal vital signs, night sweats, chest pain. No decrease in appetite, unexplained weight loss or fatigue.  No other new onset medical symptoms.  Current weight 330lb.  PIV placed left hand, 1 attempt.  Pre medicated with Solumedrol 40mg IVpush. Labs drawn none. Infusion completed without complication or reaction. Pt reports therapy is effective in managing symptoms related to therapy.    Yakelin Huber, CONCEPCIONN, RN  [phone]  [email or web address]

## 2021-03-29 NOTE — PROGRESS NOTES
This is a recent snapshot of the patient's [facility] medical record.  For current drug dose and complete information and questions, call [phone] or In Basket pool, fv home infusion ([id number])  CSN Number:  [id number]

## 2021-04-02 ENCOUNTER — DOCUMENTATION ONLY (OUTPATIENT)
Dept: PSYCHOLOGY | Facility: CLINIC | Age: 36
End: 2021-04-02

## 2021-04-02 NOTE — PROGRESS NOTES
Discharge Summary  Multiple Sessions    Client Name: Margarito Sofia MRN#: [id number] YOB: 1985      Intake / Discharge Date: 11/23/20-11/30/20       DSM5 Diagnoses: (Sustained by DSM5 Criteria Listed Above)  Diagnoses:  296.32 (F33.1) Major Depressive Disorder, Recurrent Episode, Moderate   300.02 (F41.1) Generalized Anxiety Disorder  Psychosocial & Contextual Factors: patient moved to [place] 4 years ago   WHODAS 2.0 (12 item) Score: 22          Presenting Concern:  Anxiety, panic, focusing, and managing temper      Reason for Discharge:  Client did not return      Disposition at Time of Last Encounter:   Comments:   Patient was polite and engaging at the last session. He no showed to his appointment 2/8/21.      Risk Management:   Client denies a history of suicidal ideation, suicide attempts, self-injurious behavior, homicidal ideation, homicidal behavior and and other safety concerns  Recommended that patient call 911 or go to the local ED should there be a change in any of these risk factors.          MURPHY Moore   4/2/2021

## 2021-04-12 ENCOUNTER — TELEPHONE (OUTPATIENT)
Dept: DERMATOLOGY | Facility: CLINIC | Age: 36
End: 2021-04-12

## 2021-04-13 NOTE — TELEPHONE ENCOUNTER
Prior Authorization Not Needed per Insurance    Medication: inFLIXimab (REMICADE) 100 MG injection  Insurance Company: [email or web address] - Phone [phone] Fax [phone]  Pharmacy Filling the Rx: [facility]    Per [facility] patient has a new insurance as of the first of the year and they already obtained an approval through [employer] that is good through 7/9/21, no auth is needed at this time.

## 2021-04-21 ENCOUNTER — IMMUNIZATION (OUTPATIENT)
Dept: NURSING | Facility: CLINIC | Age: 36
End: 2021-04-21
Payer: COMMERCIAL

## 2021-04-21 PROCEDURE — 91300 PR COVID VAC PFIZER DIL RECON 30 MCG/0.3 ML IM: CPT

## 2021-04-21 PROCEDURE — 0001A PR COVID VAC PFIZER DIL RECON 30 MCG/0.3 ML IM: CPT

## 2021-04-24 ENCOUNTER — HEALTH MAINTENANCE LETTER (OUTPATIENT)
Age: 36
End: 2021-04-24

## 2021-04-28 ENCOUNTER — HOME INFUSION (PRE-WILLOW HOME INFUSION) (OUTPATIENT)
Dept: PHARMACY | Facility: CLINIC | Age: 36
End: 2021-04-28

## 2021-04-30 ENCOUNTER — HOME INFUSION (PRE-WILLOW HOME INFUSION) (OUTPATIENT)
Dept: PHARMACY | Facility: CLINIC | Age: 36
End: 2021-04-30

## 2021-04-30 ENCOUNTER — DOCUMENTATION ONLY (OUTPATIENT)
Dept: PHARMACY | Facility: CLINIC | Age: 36
End: 2021-04-30

## 2021-04-30 NOTE — PROGRESS NOTES
This is a recent snapshot of the patient's [facility] medical record.  For current drug dose and complete information and questions, call [phone] or In Basket pool, fv home infusion ([id number])  CSN Number:  [id number]

## 2021-05-01 NOTE — PROGRESS NOTES
Skilled Nurse visit in the [facility] Infusion Suite to administer Remicade 700 mg IV. No recent elevated temperature, fever, chills, productive cough, coughing for 3 weeks or longer or hemoptysis, abnormal vital signs, night sweats, chest pain. No decrease in appetite, unexplained weight loss or fatigue.  No other new onset medical symptoms.  Current weight 330 lbs.  PIV placed left hand, 1 attempt.  Pre medicated with Solumedrol 40mg IVpush. Labs drawn none. Infusion completed without complication or reaction. Pt reports therapy is effective in helping managing symptoms related to therapy.     Yakelin Huber, CONCEPCIONN, RN  [phone]  [email or web address]

## 2021-05-06 NOTE — PROGRESS NOTES
This is a recent snapshot of the patient's [facility] medical record.  For current drug dose and complete information and questions, call [phone] or In [facility] pool, fv home infusion ([id number])  CSN Number:  [id number]

## 2021-05-12 ENCOUNTER — IMMUNIZATION (OUTPATIENT)
Dept: NURSING | Facility: CLINIC | Age: 36
End: 2021-05-12
Attending: INTERNAL MEDICINE
Payer: COMMERCIAL

## 2021-05-12 PROCEDURE — 0002A PR COVID VAC PFIZER DIL RECON 30 MCG/0.3 ML IM: CPT

## 2021-05-12 PROCEDURE — 91300 PR COVID VAC PFIZER DIL RECON 30 MCG/0.3 ML IM: CPT

## 2021-05-13 ENCOUNTER — DOCUMENTATION ONLY (OUTPATIENT)
Dept: ADMINISTRATIVE | Facility: CLINIC | Age: 36
End: 2021-05-13

## 2021-05-13 DIAGNOSIS — E55.9 VITAMIN D DEFICIENCY: Primary | ICD-10-CM

## 2021-06-02 ENCOUNTER — HOME INFUSION (PRE-WILLOW HOME INFUSION) (OUTPATIENT)
Dept: PHARMACY | Facility: CLINIC | Age: 36
End: 2021-06-02

## 2021-06-04 ENCOUNTER — DOCUMENTATION ONLY (OUTPATIENT)
Dept: PHARMACY | Facility: CLINIC | Age: 36
End: 2021-06-04

## 2021-06-04 ENCOUNTER — HOME INFUSION (PRE-WILLOW HOME INFUSION) (OUTPATIENT)
Dept: PHARMACY | Facility: CLINIC | Age: 36
End: 2021-06-04

## 2021-06-04 NOTE — PROGRESS NOTES
Skilled Nurse visit in the /[facility] Infusion Suite to administer Remicade 700mg.  No recent elevated temperature, fever, chills, productive cough, coughing for 3 weeks or longer or hemoptysis, abnormal vital signs, night sweats, chest pain. No  decrease in your appetite, unexplained weight loss or fatigue.  No other new onset medical symptoms.  Current weight 330lb.  PIV placed RAC, 1 attempt.  Pre medicated with Solumedrol. Labs drawn none. Infusion completed without complication or reaction. Pt reports therapy is effective in managing symptoms related to therapy.  Sherrie Coleman RN  [facility]  [email or web address]  [phone]

## 2021-06-10 ENCOUNTER — OFFICE VISIT (OUTPATIENT)
Dept: DERMATOLOGY | Facility: CLINIC | Age: 36
End: 2021-06-10
Payer: COMMERCIAL

## 2021-06-10 VITALS — SYSTOLIC BLOOD PRESSURE: 138 MMHG | DIASTOLIC BLOOD PRESSURE: 89 MMHG

## 2021-06-10 DIAGNOSIS — Z79.899 ENCOUNTER FOR LONG-TERM (CURRENT) USE OF HIGH-RISK MEDICATION: ICD-10-CM

## 2021-06-10 DIAGNOSIS — L74.519 FOCAL HYPERHIDROSIS: ICD-10-CM

## 2021-06-10 DIAGNOSIS — L74.510 AXILLARY HYPERHIDROSIS: ICD-10-CM

## 2021-06-10 DIAGNOSIS — L73.2 HIDRADENITIS SUPPURATIVA: Primary | ICD-10-CM

## 2021-06-10 DIAGNOSIS — R63.5 WEIGHT GAIN: ICD-10-CM

## 2021-06-10 PROCEDURE — 99214 OFFICE O/P EST MOD 30 MIN: CPT | Mod: GC | Performed by: DERMATOLOGY

## 2021-06-10 RX ORDER — BENZOYL PEROXIDE 10 G/100G
SUSPENSION TOPICAL
Qty: 148 ML | Refills: 11 | Status: SHIPPED | OUTPATIENT
Start: 2021-06-10 | End: 2022-03-03

## 2021-06-10 RX ORDER — GLYCOPYRROLATE 1 MG/1
1 TABLET ORAL 2 TIMES DAILY
Qty: 60 TABLET | Refills: 3 | Status: SHIPPED | OUTPATIENT
Start: 2021-06-10 | End: 2022-10-31

## 2021-06-10 NOTE — NURSING NOTE
Dermatology Rooming Note    Margarito Sofia's goals for this visit include:   Chief Complaint   Patient presents with     Derm Problem     doug is having this visit today for an HS follow up, states that things are about the same as last visit     Stephanie Galloway CMA on 6/10/2021 at 5:04 PM

## 2021-06-10 NOTE — PROGRESS NOTES
HS Qmo-[id number]-[id number] Checklist      Follow up: In-person    Labs today? NO     Fax labs to outside [place] location:      Imaging needed? NO    Referrals placed? NO    Infusion Start? NO    Infusion change/dose increase YES    Biologics change? NO    Wound care supplies? (print orders for faxing) NO    Reach out to outside providers? NO    Photos and photo consent done? NO

## 2021-06-10 NOTE — PATIENT INSTRUCTIONS
Hidradenitis suppurativa  - increase infliximab to every 4 weeks  - Labs with next infusion   - Start benzoyl peroxide wash daily. Leave on for 30 seconds, then wash off  - Will stop steroid with infusion  - If neck lesion still acting up next time will plan for excision      Sweating  Glycopyrrolate 1mg twice a day   Can decrease to half a pill twice a day if having side effects. Can also increase to 2 pills 3x a day if needed

## 2021-06-10 NOTE — LETTER
6/10/2021       RE: Margarito Sofia  [address]  [place] [address]     Dear Colleague,    Thank you for referring your patient, Margarito Sofia, to the [facility] DERMATOLOGY CLINIC [place] at [facility]. Please see a copy of my visit note below.    [facility] Dermatology Note  Encounter Date: Mando 10, 2021  Office Visit     Dermatology Problem List:  1. Hidradenitis suppurativa.  - current recommended tx: infliximab d1rearn with 40mg methylpred, ILK to two sites 3/4/21  - Patient due for next TB Quantiferon test in August of 2021  - Labs done 8/2020  -past tx: has been on ABx for acne (incl Bactrim), clindamycin, rifampin, prednisone, bactrim, methotrexate 15mg (discontinued Summer 2020 due to elevated LFTs)  - Gaine da lot of weight recently will hold steroids  - Benzoyl peroxide wash  - If neck lesion still acting up next time will plan for excision    2. Acne vulgaris  -Has tried Accutane and a number of antibiotics    3. Hx of atypical nevus - ~2005 - removed in [place] -patient will bring us records at next visit    4. Hyperhydrosis  - Glycopyrrolate 1mg BID    5. Weight Gain   - Stop steroid  - Will add on TSH to next labs    RTC in 3 months in person          ____________________________________________    Assessment & Plan:     # Hidradenitis suppurativa, Rodney stage II.  Patient relatively stable on infliximab 5 mg/kg dosing every 4 weeks, but does say he seems to flare the week before the next dose.   Given this would consider increasing his frequency to every 4 week dosing.   Patient also concerned about excessive sweating/odor during hotter months and that he is unable to wear deodorant/antiperpirant.   -   - will submit rx for increase of infliximab 5 mg/kg to every 4 weeks.       Last safety labs with Quantiferon in August 2020. Next due August 2021. Okay to wait until 3 month follow up for these.      # Hx of atypical nevus   - FBsE 6/10/21     Follow-up: 3 month(s) in-person, or earlier for new or changing lesions    Staff and Resident:     Staffed with Dr. Denny.     Barrera Lilly MD, PhD  Med-Derm PGY-5    ____________________________________________    CC: Derm Problem (doug is having this visit today for an HS follow up, states that things are about the same as last visit)    HPI:  Mr. Margarito Sofia is a(n) 35 year old male who presents today for follow-up  for hidradenitis suppurativa and was last seen by Madeline Peoples on 3/4/2021. He says today that things are pretty much the same as they were last time he was here. He has been getting infliximab infusion at 5 mg/kg every weeks. He says he tolerates these well, but sometimes gets headaches that he is able to treat with NSAIDs and they dissipate in a couple of days. He feels like infusions are helpful for him. Week before next dose, he thinks he tends to flare a little bit.   He says most active areas remain his left axilla and posterior neck. These spots were treated with ILK at last visit. He does report that he went to an urgent care since last appointment to have the lesion on posterior neck lanced so it could drain. He also feels like the tunnel on the posterior neck is spreading laterally to the right.   Patient doesn't use deodorant or antiperspirant because it irritates his axilla. He gets self conscious in hotter weather because he does sweat a lot and is concerned about the appearance of sweaty armpits as well as the smell. Curious if there is anything we could recommend for sweating of armpits.   Gained 2lbs in 3-4 mo   Patient is otherwise feeling well, without additional skin concerns.  HAs chronic tunnel on back of neck. Been there for 2 years.    Labs Reviewed:  N/A    HS Nurse Assessment    Nurse Assessment Data 6/10/2021   Over the past month, about how many recurrent or new boils have you had? 2   Over the past week, how many  dressing changes do you do each day? 0   Over the past week, has your wound drainage been: Mild   Rate your HS overall from 0-10 (0 = no disease, 10 = worst) over the past week:  0   Rate your pain score from 0-10 (0 = no disease, 10 = worst) for the most painful/symptomatic lesion in the past week:  0 - No Pain   Over the past week, how much has HS influenced your quality of life? slightly           Physical Exam:  Vitals: /89 (BP Location: Right arm, Patient Position: Sitting, Cuff Size: Adult Large)     - No other lesions of concern on areas examined.     Medications:  Current Outpatient Medications   Medication     fluorometholone (FML LIQUIFILM) 0.1 % ophthalmic suspension     inFLIXimab (REMICADE) 100 MG injection     chlorhexidine (HIBICLENS) 4 % liquid     Current Facility-Administered Medications   Medication     lidocaine 1% with EPINEPHrine 1:100,000 injection 3 mL     triamcinolone acetonide (KENALOG-10) injection 10 mg      Past Medical History:   Patient Active Problem List   Diagnosis     Hidradenitis suppurativa     No past medical history on file.    CC No referring provider defined for this encounter. on close of this encounter.      HS Aes-cb-[id number] Checklist      Follow up: In-person    Labs today? NO     Fax labs to outside [place] location:      Imaging needed? NO    Referrals placed? NO    Infusion Start? NO    Infusion change/dose increase YES    Biologics change? NO    Wound care supplies? (print orders for faxing) NO    Reach out to outside providers? NO    Photos and photo consent done? NO      [facility] Dermatology Note  Encounter Date: Mando 10, 2021  Office Visit     Dermatology Problem List:  1. Hidradenitis suppurativa.  - current recommended tx: infliximab [id number] with 40mg methylpred  - Patient due for next TB Quantiferon test in August of 2021  -past tx: has been on ABx for acne (incl Bactrim), clindamycin, rifampin, prednisone, bactrim, methotrexate  15mg (discontinued Summer 2020 due to elevated LFTs)   - Gained a lot of weight recently, holding steroids with infusions  - Benzoyl peroxide wash  - If neck lesion still acting up next time will plan for excision    2. Acne vulgaris  -Has tried Accutane and a number of antibiotics    3. Hx of atypical nevus - ~2005 - removed in [place] -patient will bring us records at next visit  - Last FBSE 6/2021     ____________________________________________    Assessment & Plan:     # Hidradenitis suppurativa, Rodney stage II.  Patient relatively stable on infliximab 5 mg/kg dosing every 5weeks, but does say he seems to flare the week before the next dose.   Given this would consider increasing his frequency to every 4 week dosing.   Patient also concerned about excessive sweating/odor during hotter months and that he is unable to wear deodorant/antiperpirant.   - will submit rx for increase of infliximab 5 mg/kg to every 4 weeks.   - Will check CBC, CMP, HgbA1c, Vitamin D and Quantiferon with next infusion    # Hx of atypical nevus   - FBsE 6/10/21     4. Hyperhydrosis  - Glycopyrrolate 1mg BID    5. Weight Gain   - Stop steroid  - Will add on TSH to next labs    RTC in 3 months in person   Follow-up: 3 month(s) in-person, or earlier for new or changing lesions    Staff and Resident:     Staffed with Dr. Denny.     Barrera Lilly MD, PhD  Med-Derm PGY-5    Staff Physician Comments:   I saw and evaluated the patient with the resident and I agree with the assessment and plan.  I was present for the examination.    Dennis Denny MD, FAAD    Departments of Internal Medicine and Dermatology  [facility]  [phone]        ____________________________________________    CC: Derm Problem (doug is having this visit today for an HS follow up, states that things are about the same as last visit)    HPI:  Mr. Margarito M Cowern is a(n) 35 year old male who presents today for follow-up  for  hidradenitis suppurativa and was last seen by Madeline Peoples on 3/4/2021. He says today that things are pretty much the same as they were last time he was here. He has been getting infliximab infusion at 5 mg/kg every 5 weeks. He says he tolerates these well, but sometimes gets headaches that he is able to treat with NSAIDs and they dissipate in a couple of days. He feels like infusions are helpful for him. Week before next dose, he thinks he tends to flare a little bit.   He says most active areas remain his left axilla and posterior neck. These spots were treated with ILK at last visit. He does report that he went to an urgent care since last appointment to have the lesion on posterior neck lanced so it could drain. He also feels like the tunnel on the posterior neck is spreading laterally to the right.   Patient doesn't use deodorant or antiperspirant because it irritates his axilla. He gets self conscious in hotter weather because he does sweat a lot and is concerned about the appearance of sweaty armpits as well as the smell. Curious if there is anything we could recommend for sweating of armpits.   Gained 2lbs in 3-4 mo   Patient is otherwise feeling well, without additional skin concerns.  HAs chronic tunnel on back of neck. Been there for 2 years.    Labs reviewed  - neg quantifeorn 8/2020    HS Nurse Assessment    Nurse Assessment Data 6/10/2021   Over the past month, about how many recurrent or new boils have you had? 2   Over the past week, how many dressing changes do you do each day? 0   Over the past week, has your wound drainage been: Mild   Rate your HS overall from 0-10 (0 = no disease, 10 = worst) over the past week:  0   Rate your pain score from 0-10 (0 = no disease, 10 = worst) for the most painful/symptomatic lesion in the past week:  0 - No Pain   Over the past week, how much has HS influenced your quality of life? slightly       Physical Exam:  Vitals: /89 (BP Location: Right arm,  Patient Position: Sitting, Cuff Size: Adult Large)      HS Exam  Head/Neck 7/7/2021   # of inflamed nodules 0   # of abcesses 0   # of draining tunnels 1   Erythema (grade) 2   Thickness (grade) 2   Open skin surface (grade) 1   Tunnels (grade) 1       Left Axilla 7/7/2021   # of inflamed nodules 1   # of abcesses 0   # of draining tunnels 1   Erythema (grade) 2   Thickness (grade) 2   Open skin surface (grade) 2   Tunnels (grade) 1       Right Axilla 7/7/2021   # of inflamed nodules 2   # of abcesses 0   # of draining tunnels 1   Erythema (grade) 2   Thickness (grade) 1   Open skin surface (grade) 1   Tunnels (grade) 1       No flowsheet data found.    No flowsheet data found.     Pubis/Genital 7/7/2021   # of inflamed nodules 1   # of abcesses 0   # of draining tunnels 1   Erythema (grade) 2   Thickness (grade) 1   Open skin surface (grade) 1   Tunnels (grade) 1       Left Thigh 12/3/2020   # of inflamed nodules 1   # of abcesses 0   # of draining tunnels 0   Erythema (grade) 1   Thickness (grade) 1   Open skin surface (grade) 0   Tunnels (grade) 0       No flowsheet data found.    No flowsheet data found.    Buttocks 7/7/2021   Pilonidal Disease? -   Pioderma Gangrenosum? No   Cutaneous Crohn's? No       HS Data  HS Exam Data 12/3/2020 3/4/2021 7/7/2021   LC Type LC1 - LC1   Clinical Subtypes Regular type - Regular type   Acne? - Yes Yes   Acne Comments - scalp, but mild -   Dissecting Cellulitis? - No No   Visual analogue score (0-100) - - 45   Total Rodney Stage II II II   Total Inflammatory Nodules 12 2 4   Total Abcesses 1 0 0   Total Draining Tunnels 1 0 4   Total Abscess and Nodule Count 13 2 4   IHS4 Score  18 2 20   Total  HASI Score 48 6 57   HS-PGA 3 - 3       Comments:    - No other lesions of concern on areas examined.     Medications:  Current Outpatient Medications   Medication     fluorometholone (FML LIQUIFILM) 0.1 % ophthalmic suspension     inFLIXimab (REMICADE) 100 MG injection      chlorhexidine (HIBICLENS) 4 % liquid     Current Facility-Administered Medications   Medication     lidocaine 1% with EPINEPHrine 1:100,000 injection 3 mL     triamcinolone acetonide (KENALOG-10) injection 10 mg      Past Medical History:   Patient Active Problem List   Diagnosis     Hidradenitis suppurativa     No past medical history on file.    CC No referring provider defined for this encounter. on close of this encounter.

## 2021-06-10 NOTE — PROGRESS NOTES
[facility] Dermatology Note  Encounter Date: Mando 10, 2021  Office Visit     Dermatology Problem List:  1. Hidradenitis suppurativa.  - current recommended tx: infliximab [id number] with 40mg methylpred, ILK to two sites 3/4/21  - Patient due for next TB Quantiferon test in August of 2021  - Labs done 8/2020  -past tx: has been on ABx for acne (incl Bactrim), clindamycin, rifampin, prednisone, bactrim, methotrexate 15mg (discontinued Summer 2020 due to elevated LFTs)  - Gaine da lot of weight recently will hold steroids  - Benzoyl peroxide wash  - If neck lesion still acting up next time will plan for excision    2. Acne vulgaris  -Has tried Accutane and a number of antibiotics    3. Hx of atypical nevus - ~2005 - removed in [place] -patient will bring us records at next visit    4. Hyperhydrosis  - Glycopyrrolate 1mg BID    5. Weight Gain   - Stop steroid  - Will add on TSH to next labs    RTC in 3 months in person          ____________________________________________    Assessment & Plan:     # Hidradenitis suppurativa, Rodney stage II.  Patient relatively stable on infliximab 5 mg/kg dosing every 4 weeks, but does say he seems to flare the week before the next dose.   Given this would consider increasing his frequency to every 4 week dosing.   Patient also concerned about excessive sweating/odor during hotter months and that he is unable to wear deodorant/antiperpirant.   -   - will submit rx for increase of infliximab 5 mg/kg to every 4 weeks.       Last safety labs with Quantiferon in August 2020. Next due August 2021. Okay to wait until 3 month follow up for these.     # Hx of atypical nevus   - FBsE 6/10/21     Follow-up: 3 month(s) in-person, or earlier for new or changing lesions    Staff and Resident:     Staffed with Dr. Denny.     Barrera Lilly MD, PhD  Med-Derm PGY-5    ____________________________________________    CC: Derm Problem (doug is having this visit today for an HS  follow up, states that things are about the same as last visit)    HPI:  Mr. Margarito Sofia is a(n) 35 year old male who presents today for follow-up  for hidradenitis suppurativa and was last seen by Madeline Peoples on 3/4/2021. He says today that things are pretty much the same as they were last time he was here. He has been getting infliximab infusion at 5 mg/kg every weeks. He says he tolerates these well, but sometimes gets headaches that he is able to treat with NSAIDs and they dissipate in a couple of days. He feels like infusions are helpful for him. Week before next dose, he thinks he tends to flare a little bit.   He says most active areas remain his left axilla and posterior neck. These spots were treated with ILK at last visit. He does report that he went to an urgent care since last appointment to have the lesion on posterior neck lanced so it could drain. He also feels like the tunnel on the posterior neck is spreading laterally to the right.   Patient doesn't use deodorant or antiperspirant because it irritates his axilla. He gets self conscious in hotter weather because he does sweat a lot and is concerned about the appearance of sweaty armpits as well as the smell. Curious if there is anything we could recommend for sweating of armpits.   Gained 2lbs in 3-4 mo   Patient is otherwise feeling well, without additional skin concerns.  HAs chronic tunnel on back of neck. Been there for 2 years.    Labs Reviewed:  N/A    HS Nurse Assessment    Nurse Assessment Data 6/10/2021   Over the past month, about how many recurrent or new boils have you had? 2   Over the past week, how many dressing changes do you do each day? 0   Over the past week, has your wound drainage been: Mild   Rate your HS overall from 0-10 (0 = no disease, 10 = worst) over the past week:  0   Rate your pain score from 0-10 (0 = no disease, 10 = worst) for the most painful/symptomatic lesion in the past week:  0 - No Pain   Over the past  week, how much has HS influenced your quality of life? slightly           Physical Exam:  Vitals: /89 (BP Location: Right arm, Patient Position: Sitting, Cuff Size: Adult Large)     - No other lesions of concern on areas examined.     Medications:  Current Outpatient Medications   Medication     fluorometholone (FML LIQUIFILM) 0.1 % ophthalmic suspension     inFLIXimab (REMICADE) 100 MG injection     chlorhexidine (HIBICLENS) 4 % liquid     Current Facility-Administered Medications   Medication     lidocaine 1% with EPINEPHrine 1:100,000 injection 3 mL     triamcinolone acetonide (KENALOG-10) injection 10 mg      Past Medical History:   Patient Active Problem List   Diagnosis     Hidradenitis suppurativa     No past medical history on file.    CC No referring provider defined for this encounter. on close of this encounter.

## 2021-06-16 ENCOUNTER — TELEPHONE (OUTPATIENT)
Dept: PHARMACY | Facility: CLINIC | Age: 36
End: 2021-06-16

## 2021-06-16 ENCOUNTER — HOME INFUSION (PRE-WILLOW HOME INFUSION) (OUTPATIENT)
Dept: PHARMACY | Facility: CLINIC | Age: 36
End: 2021-06-16

## 2021-06-16 NOTE — TELEPHONE ENCOUNTER
[facility] PRIOR AUTHORIZATION REQUEST     Drug including DOSE: Remicade 700mg q4wks (from q5wks)   J Code: , s9338, 79683, 20163  NDC: [address]-92  ICD 10 code: L73.2    Date(s) of Service: 6/17/21    Insurance Name: [employer]  Insurance ID: [id number]    Provider: LISSETTE RIVERS MD  Provider NPI: [id number]        [facility]  NPI: [id number]

## 2021-06-16 NOTE — TELEPHONE ENCOUNTER
PA Initiation    Medication: Remicade 700mg q4wks (from q5wks)   Insurance Company: [employer] - Phone [phone] Fax [phone]  Pharmacy Filling the Rx: [facility]  Filling Pharmacy Phone:    Filling Pharmacy Fax:    Start Date: 6/16/2021    Central Prior Authorization Team   Phone: [phone]

## 2021-06-17 NOTE — TELEPHONE ENCOUNTER
"Received call from Kacy shelton [facility] (call back number [phone] ref# [id number]) needing specific signs and symptoms of the flare patient experiences in the last week prior to his next infusion in order to approve the frequency increase. The chart notes patient just states that \"he seems to flare the week before the next dose\" and that is not enough information for them. Routing to clinic to see if they can please advise.           "

## 2021-06-17 NOTE — PROGRESS NOTES
[facility] Dermatology Note  Encounter Date: Mando 10, 2021  Office Visit     Dermatology Problem List:  1. Hidradenitis suppurativa.  - current recommended tx: infliximab [id number] with 40mg methylpred  - Patient due for next TB Quantiferon test in August of 2021  -past tx: has been on ABx for acne (incl Bactrim), clindamycin, rifampin, prednisone, bactrim, methotrexate 15mg (discontinued Summer 2020 due to elevated LFTs)   - Gained a lot of weight recently, holding steroids with infusions  - Benzoyl peroxide wash  - If neck lesion still acting up next time will plan for excision    2. Acne vulgaris  -Has tried Accutane and a number of antibiotics    3. Hx of atypical nevus - ~2005 - removed in [place] -patient will bring us records at next visit  - Last FBSE 6/2021     ____________________________________________    Assessment & Plan:     # Hidradenitis suppurativa, Rashaun stage II.  Patient relatively stable on infliximab 5 mg/kg dosing every 5weeks, but does say he seems to flare the week before the next dose.   Given this would consider increasing his frequency to every 4 week dosing.   Patient also concerned about excessive sweating/odor during hotter months and that he is unable to wear deodorant/antiperpirant.   - will submit rx for increase of infliximab 5 mg/kg to every 4 weeks.   - Will check CBC, CMP, HgbA1c, Vitamin D and Quantiferon with next infusion    # Hx of atypical nevus   - FBsE 6/10/21     4. Hyperhydrosis  - Glycopyrrolate 1mg BID    5. Weight Gain   - Stop steroid  - Will add on TSH to next labs    RTC in 3 months in person   Follow-up: 3 month(s) in-person, or earlier for new or changing lesions    Staff and Resident:     Staffed with Dr. Denny.     Barrera Lilly MD, PhD  Med-Derm PGY-5    Staff Physician Comments:   I saw and evaluated the patient with the resident and I agree with the assessment and plan.  I was present for the examination.    Dennis Denny MD,  DIXIE    Departments of Internal Medicine and Dermatology  [facility]  [phone]        ____________________________________________    CC: Derm Problem (doug is having this visit today for an HS follow up, states that things are about the same as last visit)    HPI:  Mr. Margarito Sofia is a(n) 35 year old male who presents today for follow-up  for hidradenitis suppurativa and was last seen by Madeline Peoples on 3/4/2021. He says today that things are pretty much the same as they were last time he was here. He has been getting infliximab infusion at 5 mg/kg every 5 weeks. He says he tolerates these well, but sometimes gets headaches that he is able to treat with NSAIDs and they dissipate in a couple of days. He feels like infusions are helpful for him. Week before next dose, he thinks he tends to flare a little bit.   He says most active areas remain his left axilla and posterior neck. These spots were treated with ILK at last visit. He does report that he went to an urgent care since last appointment to have the lesion on posterior neck lanced so it could drain. He also feels like the tunnel on the posterior neck is spreading laterally to the right.   Patient doesn't use deodorant or antiperspirant because it irritates his axilla. He gets self conscious in hotter weather because he does sweat a lot and is concerned about the appearance of sweaty armpits as well as the smell. Curious if there is anything we could recommend for sweating of armpits.   Gained 2lbs in 3-4 mo   Patient is otherwise feeling well, without additional skin concerns.  HAs chronic tunnel on back of neck. Been there for 2 years.    Labs reviewed  - neg quantifeorn 8/2020    HS Nurse Assessment    Nurse Assessment Data 6/10/2021   Over the past month, about how many recurrent or new boils have you had? 2   Over the past week, how many dressing changes do you do each day? 0   Over the past week, has your wound  drainage been: Mild   Rate your HS overall from 0-10 (0 = no disease, 10 = worst) over the past week:  0   Rate your pain score from 0-10 (0 = no disease, 10 = worst) for the most painful/symptomatic lesion in the past week:  0 - No Pain   Over the past week, how much has HS influenced your quality of life? slightly       Physical Exam:  Vitals: /89 (BP Location: Right arm, Patient Position: Sitting, Cuff Size: Adult Large)      HS Exam  Head/Neck 7/7/2021   # of inflamed nodules 0   # of abcesses 0   # of draining tunnels 1   Erythema (grade) 2   Thickness (grade) 2   Open skin surface (grade) 1   Tunnels (grade) 1       Left Axilla 7/7/2021   # of inflamed nodules 1   # of abcesses 0   # of draining tunnels 1   Erythema (grade) 2   Thickness (grade) 2   Open skin surface (grade) 2   Tunnels (grade) 1       Right Axilla 7/7/2021   # of inflamed nodules 2   # of abcesses 0   # of draining tunnels 1   Erythema (grade) 2   Thickness (grade) 1   Open skin surface (grade) 1   Tunnels (grade) 1       No flowsheet data found.    No flowsheet data found.     Pubis/Genital 7/7/2021   # of inflamed nodules 1   # of abcesses 0   # of draining tunnels 1   Erythema (grade) 2   Thickness (grade) 1   Open skin surface (grade) 1   Tunnels (grade) 1       Left Thigh 12/3/2020   # of inflamed nodules 1   # of abcesses 0   # of draining tunnels 0   Erythema (grade) 1   Thickness (grade) 1   Open skin surface (grade) 0   Tunnels (grade) 0       No flowsheet data found.    No flowsheet data found.    Buttocks 7/7/2021   Pilonidal Disease? -   Pioderma Gangrenosum? No   Cutaneous Crohn's? No       HS Data  HS Exam Data 12/3/2020 3/4/2021 7/7/2021   LC Type LC1 - LC1   Clinical Subtypes Regular type - Regular type   Acne? - Yes Yes   Acne Comments - scalp, but mild -   Dissecting Cellulitis? - No No   Visual analogue score (0-100) - - 45   Total Rodney Stage II II II   Total Inflammatory Nodules 12 2 4   Total Abcesses 1 0 0    Total Draining Tunnels 1 0 4   Total Abscess and Nodule Count 13 2 4   IHS4 Score  18 2 20   Total  HASI Score 48 6 57   HS-PGA 3 - 3       Comments:    - No other lesions of concern on areas examined.     Medications:  Current Outpatient Medications   Medication     fluorometholone (FML LIQUIFILM) 0.1 % ophthalmic suspension     inFLIXimab (REMICADE) 100 MG injection     chlorhexidine (HIBICLENS) 4 % liquid     Current Facility-Administered Medications   Medication     lidocaine 1% with EPINEPHrine 1:100,000 injection 3 mL     triamcinolone acetonide (KENALOG-10) injection 10 mg      Past Medical History:   Patient Active Problem List   Diagnosis     Hidradenitis suppurativa     No past medical history on file.    CC No referring provider defined for this encounter. on close of this encounter.

## 2021-06-22 NOTE — PROGRESS NOTES
This is a recent snapshot of the patient's [facility] medical record.  For current drug dose and complete information and questions, call [phone] or In Basket pool, fv home infusion ([id number])  CSN Number:  [id number]

## 2021-06-24 NOTE — TELEPHONE ENCOUNTER
PRIOR AUTHORIZATION DENIED    Medication: Remicade 700mg q4wks (from q5wks)     Denial Date: 6/24/2021    Denial Rational: Frequency increase denied.     Appeal Information:

## 2021-06-28 NOTE — PROGRESS NOTES
"Per Dr. Denny: \"I saw the message regarding his headaches after the infsuion.   Please let him know I have ordered benadryl and tylenol to be given 30 min prior to his infusions. Please also update iunfusion nurse and send them the orders for the pre-treatment.   Thanks   Dennis\"  "

## 2021-06-29 ENCOUNTER — HOME INFUSION (PRE-WILLOW HOME INFUSION) (OUTPATIENT)
Dept: PHARMACY | Facility: CLINIC | Age: 36
End: 2021-06-29

## 2021-06-29 NOTE — TELEPHONE ENCOUNTER
Checking in on this denial with clinic to see if provider would like to appeal, insurance needed more details on the break through symptoms patient was experiencing in order to approve the increase to every 4 weeks, if still wanting patient to infuse every 4 weeks his next dose would be needed on 7/2/21.

## 2021-07-06 ENCOUNTER — HOME INFUSION (PRE-WILLOW HOME INFUSION) (OUTPATIENT)
Dept: PHARMACY | Facility: CLINIC | Age: 36
End: 2021-07-06

## 2021-07-06 NOTE — PROGRESS NOTES
This is a recent snapshot of the patient's [facility] medical record.  For current drug dose and complete information and questions, call [phone] or In Basket pool, fv home infusion ([id number])  CSN Number:  [id number]

## 2021-07-06 NOTE — PROGRESS NOTES
This is a recent snapshot of the patient's [facility] medical record.  For current drug dose and complete information and questions, call [phone] or In Basket pool, fv home infusion ([id number])  CSN Number:  [id number]

## 2021-07-07 ENCOUNTER — HOME INFUSION (PRE-WILLOW HOME INFUSION) (OUTPATIENT)
Dept: PHARMACY | Facility: CLINIC | Age: 36
End: 2021-07-07

## 2021-07-07 ENCOUNTER — TELEPHONE (OUTPATIENT)
Dept: DERMATOLOGY | Facility: CLINIC | Age: 36
End: 2021-07-07

## 2021-07-07 NOTE — TELEPHONE ENCOUNTER
[facility] Call Center    Phone Message    May a detailed message be left on voicemail: yes     Reason for Call: Order(s): Other:   Reason for requested: Needs verbal orders confirmed for Remicade infusion 5x/week. Needs call back by Thursday so can prepare for Friday infusion. Thank you.   Date needed: ASAP  Provider name: Dr. Denny      Action Taken: Message routed to:  [facility] ([facility] DERM    Travel Screening: Not Applicable

## 2021-07-07 NOTE — TELEPHONE ENCOUNTER
Medication Appeal Initiation    We have initiated an appeal for the requested medication:  Medication: Remicade 700mg q4wks (from q5wks)   Appeal Start Date:  7/7/2021  Insurance Company: [employer] - Phone [phone] Fax [phone]  Comments:  Appeal initiated with letter of medical necessity included and faxed to [employer] fax# [phone]

## 2021-07-08 ENCOUNTER — HOME INFUSION (PRE-WILLOW HOME INFUSION) (OUTPATIENT)
Dept: PHARMACY | Facility: CLINIC | Age: 36
End: 2021-07-08

## 2021-07-09 ENCOUNTER — DOCUMENTATION ONLY (OUTPATIENT)
Dept: PHARMACY | Facility: CLINIC | Age: 36
End: 2021-07-09

## 2021-07-09 ENCOUNTER — HOME INFUSION (PRE-WILLOW HOME INFUSION) (OUTPATIENT)
Dept: PHARMACY | Facility: CLINIC | Age: 36
End: 2021-07-09

## 2021-07-09 ENCOUNTER — MEDICAL CORRESPONDENCE (OUTPATIENT)
Dept: HEALTH INFORMATION MANAGEMENT | Facility: CLINIC | Age: 36
End: 2021-07-09

## 2021-07-09 LAB
ALBUMIN SERPL-MCNC: 3.5 G/DL (ref 3.4–5)
ALP SERPL-CCNC: 91 U/L (ref 40–150)
ALT SERPL W P-5'-P-CCNC: 64 U/L (ref 0–70)
ANION GAP SERPL CALCULATED.3IONS-SCNC: 5 MMOL/L (ref 3–14)
AST SERPL W P-5'-P-CCNC: 37 U/L (ref 0–45)
BASOPHILS # BLD AUTO: 0.1 10E9/L (ref 0–0.2)
BASOPHILS NFR BLD AUTO: 0.7 %
BILIRUB SERPL-MCNC: 0.4 MG/DL (ref 0.2–1.3)
BUN SERPL-MCNC: 10 MG/DL (ref 7–30)
CALCIUM SERPL-MCNC: 9.3 MG/DL (ref 8.5–10.1)
CHLORIDE SERPL-SCNC: 105 MMOL/L (ref 94–109)
CO2 SERPL-SCNC: 26 MMOL/L (ref 20–32)
CREAT SERPL-MCNC: 0.84 MG/DL (ref 0.66–1.25)
DIFFERENTIAL METHOD BLD: ABNORMAL
EOSINOPHIL # BLD AUTO: 0.4 10E9/L (ref 0–0.7)
EOSINOPHIL NFR BLD AUTO: 2.9 %
ERYTHROCYTE [DISTWIDTH] IN BLOOD BY AUTOMATED COUNT: 12.9 % (ref 10–15)
GFR SERPL CREATININE-BSD FRML MDRD: >90 ML/MIN/{1.73_M2}
GLUCOSE SERPL-MCNC: 84 MG/DL (ref 70–99)
HBA1C MFR BLD: 6.2 % (ref 0–5.6)
HCT VFR BLD AUTO: 45.6 % (ref 40–53)
HGB BLD-MCNC: 14.9 G/DL (ref 13.3–17.7)
IMM GRANULOCYTES # BLD: 0.1 10E9/L (ref 0–0.4)
IMM GRANULOCYTES NFR BLD: 0.7 %
LYMPHOCYTES # BLD AUTO: 3.9 10E9/L (ref 0.8–5.3)
LYMPHOCYTES NFR BLD AUTO: 31.7 %
MCH RBC QN AUTO: 28.9 PG (ref 26.5–33)
MCHC RBC AUTO-ENTMCNC: 32.7 G/DL (ref 31.5–36.5)
MCV RBC AUTO: 88 FL (ref 78–100)
MONOCYTES # BLD AUTO: 1.3 10E9/L (ref 0–1.3)
MONOCYTES NFR BLD AUTO: 10.4 %
NEUTROPHILS # BLD AUTO: 6.6 10E9/L (ref 1.6–8.3)
NEUTROPHILS NFR BLD AUTO: 53.6 %
NRBC # BLD AUTO: 0 10*3/UL
NRBC BLD AUTO-RTO: 0 /100
PLATELET # BLD AUTO: 345 10E9/L (ref 150–450)
POTASSIUM SERPL-SCNC: 3.7 MMOL/L (ref 3.4–5.3)
PROT SERPL-MCNC: 8.1 G/DL (ref 6.8–8.8)
RBC # BLD AUTO: 5.16 10E12/L (ref 4.4–5.9)
SODIUM SERPL-SCNC: 136 MMOL/L (ref 133–144)
TSH SERPL DL<=0.005 MIU/L-ACNC: 1.31 MU/L (ref 0.4–4)
WBC # BLD AUTO: 12.2 10E9/L (ref 4–11)

## 2021-07-09 PROCEDURE — 80053 COMPREHEN METABOLIC PANEL: CPT | Performed by: DERMATOLOGY

## 2021-07-09 PROCEDURE — 84443 ASSAY THYROID STIM HORMONE: CPT | Performed by: DERMATOLOGY

## 2021-07-09 PROCEDURE — 83036 HEMOGLOBIN GLYCOSYLATED A1C: CPT | Performed by: DERMATOLOGY

## 2021-07-09 PROCEDURE — 85025 COMPLETE CBC W/AUTO DIFF WBC: CPT | Performed by: DERMATOLOGY

## 2021-07-09 PROCEDURE — 82306 VITAMIN D 25 HYDROXY: CPT | Performed by: DERMATOLOGY

## 2021-07-09 NOTE — PROGRESS NOTES
Skilled Nurse visit in the [facility] Suite to administer Remicade 700 mg IV. No recent elevated temperature, fever, chills, productive cough, coughing for 3 weeks or longer or hemoptysis, abnormal vital signs, night sweats, chest pain. No decrease in appetite, unexplained weight loss or fatigue.  No other new onset medical symptoms.  Current weight 340 lbs.  PIV placed right wrist, 3 attempts.  Pre medicated with Tylenol 650 mg po and Benadryl 50 mg po. Labs drawn pre-infusion: CBCD/P, CMP, TSH, vitamin D and Hgb A1c. Infusion completed without complication or reaction. Pt reports therapy is effective in helping managing symptoms related to therapy.     CONCEPCION LevinN, RN  [phone]  [email or web address]

## 2021-07-10 ENCOUNTER — TELEPHONE (OUTPATIENT)
Dept: DERMATOLOGY | Facility: CLINIC | Age: 36
End: 2021-07-10

## 2021-07-10 LAB — DEPRECATED CALCIDIOL+CALCIFEROL SERPL-MC: 10 UG/L (ref 20–75)

## 2021-07-10 NOTE — TELEPHONE ENCOUNTER
----- Message from Anya Peres [facility] sent at 7/6/2021  9:44 AM CDT -----  Regarding: Remicade  Jarrod Penny's insurance is denying the PA for Remicade 700mg (5mg/kg) every 4 weeks and will need a letter of medical necessity to appeal the denial.     In the meantime, patient is due for his every 5 week (previous order) Remicade 5mg/kg on 7/9/21. Please let us know if you're okay with us moving forward with the 7/9 infusion and continuing the every 5 week infusion while we await on the PA for the every 4 week dose?     Thank you,     Anya Peres, PharmMARCOS  [facility]  Main: [phone]  Fax: [phone]

## 2021-07-12 NOTE — PROGRESS NOTES
This is a recent snapshot of the patient's [facility] medical record.  For current drug dose and complete information and questions, call [phone] or In Basket pool, fv home infusion ([id number])  CSN Number:  [id number]

## 2021-07-13 NOTE — TELEPHONE ENCOUNTER
Prior Authorization Follow Up    Called [employer] - Phone [phone] to check status of Remicade 700mg q4wks (from q5wks) .Per rep Joel does handle their appeals for injectable meds and they forwarded the appeal to the, ph# [phone]; called them and they said they had not received it, she stated their best direct fax is [phone]

## 2021-07-14 NOTE — TELEPHONE ENCOUNTER
Insurance has approved this til July, but a new auth will need to be created through their online portal for the every 4 week dosing.  Request is currently pending; Tracking Number:[id number]

## 2021-07-15 NOTE — TELEPHONE ENCOUNTER
Per rep at [facility] the appeal was cancelled since patient did not start the 4 weeks dosing on the denied 06/17/21 request and stated that they will proceed with the case created 07/14/21. Rep stated since it is considered an off label dosing the best option would be for provider to do a peer to peer, their phone number is [phone], but she is not the same reviewer for the newer created case, but that someone would reach out if more information is needed.

## 2021-07-15 NOTE — PROGRESS NOTES
This is a recent snapshot of the patient's [facility] medical record.  For current drug dose and complete information and questions, call [phone] or In Basket pool, fv home infusion ([id number])  CSN Number:  [id number]

## 2021-07-15 NOTE — PROGRESS NOTES
This is a recent snapshot of the patient's [facility] medical record.  For current drug dose and complete information and questions, call [phone] or In Basket pool, fv home infusion ([id number])  CSN Number:  [id number]

## 2021-07-16 ENCOUNTER — HOME INFUSION (PRE-WILLOW HOME INFUSION) (OUTPATIENT)
Dept: PHARMACY | Facility: CLINIC | Age: 36
End: 2021-07-16

## 2021-07-16 RX ORDER — VITAMIN B COMPLEX
2 TABLET ORAL DAILY
Qty: 180 TABLET | Refills: 4 | Status: SHIPPED | OUTPATIENT
Start: 2021-07-16 | End: 2022-10-31

## 2021-07-17 ENCOUNTER — TELEPHONE (OUTPATIENT)
Dept: DERMATOLOGY | Facility: CLINIC | Age: 36
End: 2021-07-17

## 2021-07-17 NOTE — TELEPHONE ENCOUNTER
----- Message from Dennis Denny MD sent at 7/16/2021  6:50 AM CDT -----  Regarding: RE: Remicade  OK to continue every 5 weeks until approved for every 4 weeks  Dennis  ----- Message -----  From: [facility]  Sent: 7/6/2021   9:44 AM CDT  To: Dennis Denny MD, Carley Arellano, EMT  Subject: Remicade                                         Rebecca Pennysh's insurance is denying the PA for Remicade 700mg (5mg/kg) every 4 weeks and will need a letter of medical necessity to appeal the denial.     In the meantime, patient is due for his every 5 week (previous order) Remicade 5mg/kg on 7/9/21. Please let us know if you're okay with us moving forward with the 7/9 infusion and continuing the every 5 week infusion while we await on the PA for the every 4 week dose?     Thank you,     Anya Peres, PharmD  [facility]  Main: [phone]  Fax: [phone]

## 2021-07-17 NOTE — TELEPHONE ENCOUNTER
Left message to notify patient to continue Remicade every 5 weeks per Dr. Denny. Number provided.

## 2021-07-19 NOTE — PROGRESS NOTES
This is a recent snapshot of the patient's [facility] medical record.  For current drug dose and complete information and questions, call [phone] or In Basket pool, fv home infusion ([id number])  CSN Number:  [id number]

## 2021-07-21 NOTE — PROGRESS NOTES
This is a recent snapshot of the patient's [facility] medical record.  For current drug dose and complete information and questions, call [phone] or In Basket pool, fv home infusion ([id number])  CSN Number:  [id number]

## 2021-07-21 NOTE — TELEPHONE ENCOUNTER
PRIOR AUTHORIZATION DENIED    Medication: Remicade 700mg q4wks (from q5wks)     Denial Date: 6/24/2021    Denial Rational: Denied since the increased dosing is considered an off label dosing the best option would be for provider to do a peer to peer, their phone number is [phone]    Appeal Information: phone number is [phone] for peer to peer scheduling.

## 2021-07-22 ENCOUNTER — HOME INFUSION (PRE-WILLOW HOME INFUSION) (OUTPATIENT)
Dept: PHARMACY | Facility: CLINIC | Age: 36
End: 2021-07-22

## 2021-07-22 NOTE — PROGRESS NOTES
This is a recent snapshot of the patient's [facility] medical record.  For current drug dose and complete information and questions, call [phone] or In Basket pool, fv home infusion ([id number])  CSN Number:  [id number]

## 2021-07-26 NOTE — PROGRESS NOTES
This is a recent snapshot of the patient's [facility] medical record.  For current drug dose and complete information and questions, call [phone] or In Basket pool, fv home infusion ([id number])  CSN Number:  [id number]

## 2021-07-27 ENCOUNTER — HOME INFUSION (PRE-WILLOW HOME INFUSION) (OUTPATIENT)
Dept: PHARMACY | Facility: CLINIC | Age: 36
End: 2021-07-27

## 2021-08-02 ENCOUNTER — HOME INFUSION (PRE-WILLOW HOME INFUSION) (OUTPATIENT)
Dept: PHARMACY | Facility: CLINIC | Age: 36
End: 2021-08-02

## 2021-08-02 NOTE — TELEPHONE ENCOUNTER
MEDICATION APPEAL APPROVED    Medication: Remicade 700mg q4wks (from q5wks)   Authorization Effective Date: 8/6/2021  Authorization Expiration Date: 2/1/2022  Approved Dose/Quantity:   Reference #: [id number]     Insurance Company: [employer] - Phone [phone] Fax [phone]  Which Pharmacy is filling the prescription (Not needed for infusion/clinic administered): [facility]

## 2021-08-02 NOTE — PROGRESS NOTES
This is a recent snapshot of the patient's [facility] medical record.  For current drug dose and complete information and questions, call [phone] or In Basket pool, fv home infusion ([id number])  CSN Number:  [id number]

## 2021-08-03 ENCOUNTER — HOME INFUSION (PRE-WILLOW HOME INFUSION) (OUTPATIENT)
Dept: PHARMACY | Facility: CLINIC | Age: 36
End: 2021-08-03

## 2021-08-06 ENCOUNTER — HOME INFUSION (PRE-WILLOW HOME INFUSION) (OUTPATIENT)
Dept: PHARMACY | Facility: CLINIC | Age: 36
End: 2021-08-06

## 2021-08-09 NOTE — PROGRESS NOTES
This is a recent snapshot of the patient's [facility] medical record.  For current drug dose and complete information and questions, call [phone] or In Basket pool, fv home infusion ([id number])  CSN Number:  [id number]

## 2021-08-09 NOTE — PROGRESS NOTES
This is a recent snapshot of the patient's [facility] medical record.  For current drug dose and complete information and questions, call [phone] or In Basket pool, fv home infusion ([id number])  CSN Number:  [id number]

## 2021-08-10 ENCOUNTER — HOME INFUSION (PRE-WILLOW HOME INFUSION) (OUTPATIENT)
Dept: PHARMACY | Facility: CLINIC | Age: 36
End: 2021-08-10

## 2021-08-12 NOTE — TELEPHONE ENCOUNTER
"Patient informed.    Francoise Fitzgerald CMA  to Jarrod Sofia    7/19/21 11:38 AM  Tirso Garay,     Please see Dr. Quiles note below.  \"OK to continue every 5 weeks until approved for every 4 weeks\"     Let us know if you have any questions.     Francoise Fitzgerald CMA     Last read by Margarito Sofia at 1:33 PM on 7/19/2021.    Stephanie Galloway CMA on 8/12/2021 at 9:10 AM  "

## 2021-08-13 NOTE — PROGRESS NOTES
This is a recent snapshot of the patient's [facility] medical record.  For current drug dose and complete information and questions, call [phone] or In Basket pool, fv home infusion ([id number])  CSN Number:  [id number]

## 2021-08-18 ENCOUNTER — HOME INFUSION (PRE-WILLOW HOME INFUSION) (OUTPATIENT)
Dept: PHARMACY | Facility: CLINIC | Age: 36
End: 2021-08-18

## 2021-08-19 DIAGNOSIS — E55.9 VITAMIN D DEFICIENCY: ICD-10-CM

## 2021-08-20 NOTE — TELEPHONE ENCOUNTER
vitamin D3 (CHOLECALCIFEROL) 1.25 MG (21549 UT) capsule  Last Written Prescription Date:  7/16/2021  Last Fill Quantity: 8,   # refills: 0  Last Office Visit : 6/10/2021  Future Office visit:  10/7/2021    Routing refill request to provider for review/approval because:  Drug not on the [facility] or [facility] refill protocol or controlled substance      Josiane Funk RN  Central Triage Red Flags/Med Refills

## 2021-08-20 NOTE — PROGRESS NOTES
This is a recent snapshot of the patient's [facility] medical record.  For current drug dose and complete information and questions, call [phone] or In Basket pool, fv home infusion ([id number])  CSN Number:  [id number]

## 2021-08-25 ENCOUNTER — TELEPHONE (OUTPATIENT)
Dept: DERMATOLOGY | Facility: CLINIC | Age: 36
End: 2021-08-25

## 2021-08-25 NOTE — TELEPHONE ENCOUNTER
Myesha Whyte MA Woodfill, Nancy; Stephanie Galloway CMA Hi Nancy,   I will discuss with Dr. Denny on Thursday when he is here. I think we can set it up.     Thank you,   PATO Vicente           Previous Messages       ----- Message -----   From: Cheryl Alexander   Sent: 7/19/2021   8:45 AM CDT   To: Stephanie Galloway CMA, Myesha Whyte MA   Subject: Peer to peer set up                               Hello,       Dr Denny would like a [id number] set up for this patient.     Are you going to set one up or would you like me to call?       I just wanted to clarify.     My [facility] asked me to set one up for Dr Denny, but I don't know what number PA dept needs to call the Dr back at and what time and date the Dr would be available to talk.     Please let me know   Thank you kindly       Here's the number to call to set up the peer to peer   [facility]   [phone]      Dennis Denny MD Wilhelm, Miriam E [facility]; Stephanie Galloway CMA; Myesha Whyte MA; P [facility]  Please set up a peer to peer and I wrote a letter   Dennis           Previous Messages       ----- Message -----   From: Mikayla Ogden [facility]   Sent: 7/14/2021  11:21 AM CDT   To: Dennis Denny MD   Subject: Denial for every 4 week infusions                 Dr. Denny-   Patient Jarrod Sofia had an insurance denial for the every 4 week Remicade infusions. He is flaring around the 4 week nia. You will need to do a letter of medical necessity or a peer to peer with the insurance.   Also on a side note. Patient doesn't want to take the pre-meds of oral Tylenol and Benadryl.   Thanks, Mikayla [facility] Pharmacist       Carley Arellano, EMT  Dennis Denny MD; Stephanie Galloway CMA  Letter needed!     Thanks!           Previous Messages       ----- Message -----   From: Anya Peres [facility]   Sent: 7/6/2021   9:44 AM CDT   To: Dennis Denny MD, Carley Arellano, EMT   Subject: Remicade                                          Jarrod Penny's insurance is denying the PA for Remicade 700mg (5mg/kg) every 4 weeks and will need a letter of medical necessity to appeal the denial.     In the meantime, patient is due for his every 5 week (previous order) Remicade 5mg/kg on 7/9/21. Please let us know if you're okay with us moving forward with the 7/9 infusion and continuing the every 5 week infusion while we await on the PA for the every 4 week dose?     Thank you,     Anya Peres, PharmD   [facility]   Main: [phone]   Fax: [phone]

## 2021-08-26 NOTE — PROGRESS NOTES
This is a recent snapshot of the patient's [facility] medical record.  For current drug dose and complete information and questions, call [phone] or In Basket pool, fv home infusion ([id number])  CSN Number:  [id number]

## 2021-09-01 ENCOUNTER — HOME INFUSION (PRE-WILLOW HOME INFUSION) (OUTPATIENT)
Dept: PHARMACY | Facility: CLINIC | Age: 36
End: 2021-09-01

## 2021-09-03 ENCOUNTER — HOME INFUSION (PRE-WILLOW HOME INFUSION) (OUTPATIENT)
Dept: PHARMACY | Facility: CLINIC | Age: 36
End: 2021-09-03

## 2021-09-03 NOTE — PROGRESS NOTES
This is a recent snapshot of the patient's [facility] medical record.  For current drug dose and complete information and questions, call [phone] or In [facility] pool, fv home infusion ([id number])  CSN Number:  [id number]

## 2021-09-24 NOTE — PROGRESS NOTES
This is a recent snapshot of the patient's [facility] medical record.  For current drug dose and complete information and questions, call [phone] or In [facility] pool, fv home infusion ([id number])  CSN Number:  [id number]

## 2021-09-29 ENCOUNTER — HOME INFUSION (PRE-WILLOW HOME INFUSION) (OUTPATIENT)
Dept: PHARMACY | Facility: CLINIC | Age: 36
End: 2021-09-29

## 2021-10-01 ENCOUNTER — HOME INFUSION (PRE-WILLOW HOME INFUSION) (OUTPATIENT)
Dept: PHARMACY | Facility: CLINIC | Age: 36
End: 2021-10-01

## 2021-10-01 ENCOUNTER — DOCUMENTATION ONLY (OUTPATIENT)
Dept: PHARMACY | Facility: CLINIC | Age: 36
End: 2021-10-01

## 2021-10-01 NOTE — PROGRESS NOTES
Skilled Nurse visit in the [facility] Infusion Suite to administer Remicade 700 mg IV. No recent elevated temperature, fever, chills, productive cough, coughing for 3 weeks or longer or hemoptysis, abnormal vital signs, night sweats, chest pain. No decrease in appetite, unexplained weight loss or fatigue.  No other new onset medical symptoms.  Current weight 330 lbs.  PIV placed right AC, 4 attempts.  Pre medicated with Tylenol 650 mg po and Benadryl 50 mg po. No labs ordered. Infusion completed without complication or reaction. Pt reports therapy is effective in helping managing symptoms related to therapy.     CONCEPCION LevinN, RN  [phone]  [email or web address]

## 2021-10-05 NOTE — PROGRESS NOTES
This is a recent snapshot of the patient's [facility] medical record.  For current drug dose and complete information and questions, call [phone] or In Basket pool, fv home infusion ([id number])  CSN Number:  [id number]

## 2021-10-07 ENCOUNTER — OFFICE VISIT (OUTPATIENT)
Dept: DERMATOLOGY | Facility: CLINIC | Age: 36
End: 2021-10-07
Payer: COMMERCIAL

## 2021-10-07 VITALS — WEIGHT: 315 LBS | BODY MASS INDEX: 42.66 KG/M2 | HEIGHT: 72 IN

## 2021-10-07 DIAGNOSIS — E66.01 MORBID OBESITY (H): ICD-10-CM

## 2021-10-07 DIAGNOSIS — L73.2 HIDRADENITIS SUPPURATIVA: Primary | ICD-10-CM

## 2021-10-07 PROCEDURE — 99214 OFFICE O/P EST MOD 30 MIN: CPT | Performed by: DERMATOLOGY

## 2021-10-07 ASSESSMENT — MIFFLIN-ST. JEOR: SCORE: 2482.55

## 2021-10-07 NOTE — LETTER
Date:November 6, 2021      Patient was self referred, no letter generated. Do not send.        [facility] Health Information

## 2021-10-07 NOTE — NURSING NOTE
Dermatology Rooming Note    Margarito Sofia's goals for this visit include:   Chief Complaint   Patient presents with     Derm Problem     Jarrod is here to follow up on HS. Jarrod has new areas that are mild

## 2021-10-07 NOTE — PROGRESS NOTES
[facility] Dermatology Note  Encounter Date: Oct 7, 2021  Office Visit     Dermatology Problem List:  1. Hidradenitis suppurativa.  - current recommended tx: infliximab [id number] with 40mg methylpred, ILK to two sites 3/4/21, referred to plastics 10/7/2021   - Patient due for next TB Quantiferon test in August of 2021  - Labs done 8/2020  -past tx: has been on ABx for acne (incl Bactrim), clindamycin, rifampin, prednisone, bactrim, methotrexate 15mg (discontinued Summer 2020 due to elevated LFTs)  - Gaine da lot of weight recently will hold steroids  - Benzoyl peroxide wash  - If neck lesion still acting up next time will plan for excision     2. Acne vulgaris  -Has tried Accutane and a number of antibiotics   3. Hx of atypical nevus - ~2005 - removed in [place] -patient will bring us records at next visit  4. Hyperhydrosis  - Glycopyrrolate 1mg BID   ____________________________________________    Assessment & Plan:  # Hidradenitis suppurativa, Rodney stage II.  - Pt still with severe disease as measure by IHS4 but only slight effect on QOL  - Continue infliximab 5 mg/kg every 4 weeks, will check antibodies. Consider increasing dose.   - Referral to plastics for excision.   - Weight gain improved after hold IV steroids with infusion     # Hx of atypical nevus   - FBSE at follow-up  - Will follow-up at next visit regarding prior records.    Follow-up: 8 weeks visa phone    Staff, Medical Student and Scribe:   DAISY MOORE, MS3    Scribe Disclosure:  I, Michelle Burton, am serving as a scribe to document services personally performed by Dennis Denny MD based on data collection and the provider's statements to me.     Provider Disclosure:   The documentation recorded by the scribe accurately reflects the services I personally performed and the decisions made by me.    Dennis Denny MD, FAAD    Departments of Internal Medicine and Dermatology  [facility]  [place]  [phone]      Staff Physician:  I was present with the medical student who participated in the service and in the documentation of the note. I have verified the history and personally performed the physical exam and medical decision making. I agree with the assessment and plan of care as documented in the note.     Dennis Denny MD    Department of Dermatology  [facility]: Phone: [phone], Fax: [phone]  10/7/2021        ____________________________________________    CC: Derm Problem (Jarrod is here to follow up on HS. Jarrod has new areas that are mild)    HPI:  Mr. Margarito Sofia is a(n) 35 year old male who presents today as a return patient for HS 3 month follow up.    Last seen 6/10/21 by me, at which time the patient continued but increased dose of infliximab 5 mg/kg for treatment of HS.     He feels his HS is relatively stable, though he has gotten flares on the neck and axillae that have been extremely bothersome. He notes in particular the right axilla and right inframammary fold have developed draining lesions. His neck and left axilla lesions continue to drain and are extremely uncomfortable. He has noticed that since switching to infliximab every 4 weeks with the increased dose this has helped prevent the flares he had been getting in the week leading up to his injections in the past. He continues to use BP every 2-3 days. Today he would like to discuss potential excisions. He has gotten these before and though his HS lesions tend to recur (generally a year or so after) he feels the severity of the lesions he has now might warrant the procedure.     Patient is otherwise feeling well, without additional skin concerns.    HS Nurse Assessment    Nurse Assessment Data 6/10/2021 10/7/2021   Over the past month, about how many recurrent or new boils have you had? 2 2 recurring 2 new   Over the  past week, how many dressing changes do you do each day? 0 1   Over the past week, has your wound drainage been: Mild Mild   Rate your HS overall from 0-10 (0 = no disease, 10 = worst) over the past week:  0 4   Rate your pain score from 0-10 (0 = no disease, 10 = worst) for the most painful/symptomatic lesion in the past week:  0 - No Pain 2   Over the past week, how much has HS influenced your quality of life? slightly slightly     Labs Reviewed:  N/A    Physical Exam:  Vitals: There were no vitals taken for this visit.  SKIN: Focused examination of neck, axillae, chest, abdomen and groin was performed.  - Erythematous nodules with open sinuses, tracts and scarring in the left axilla   - milder erythema with fewer nodules and sinuses in the right axilla  - violaceous ill defined plaque and erythematous nodule on the posterior neck   - erythematous papule and linear plaque along left lower back intertriginous fold  -erythematous ill defined plaque on the right inframammary fold   -scattered pedunculated skin colored papules around the neck, axillae, and abdomen  - No other lesions of concern on areas examined.     HS Exam  Head/Neck 10/7/2021   # of inflamed nodules 0   # of abcesses 0   # of draining tunnels 1   Erythema (grade) 2   Thickness (grade) 3   Open skin surface (grade) 1   Tunnels (grade) 1       Left Axilla 10/7/2021   # of inflamed nodules 2   # of abcesses 0   # of draining tunnels 2   Erythema (grade) 2   Thickness (grade) 2   Open skin surface (grade) 2   Tunnels (grade) 2       Right Axilla 10/7/2021   # of inflamed nodules 3   # of abcesses 0   # of draining tunnels 1   Erythema (grade) 2   Thickness (grade) 1   Open skin surface (grade) 1   Tunnels (grade) 0       Chest 10/7/2021   # of inflamed nodules 2   # of abcesses 0   # of draining tunnels 0   Erythema (grade) 2   Thickness (grade) 1   Open skin surface (grade) 0   Tunnels (grade) 0       No flowsheet data found.     Pubis/Genital  7/7/2021   # of inflamed nodules 1   # of abcesses 0   # of draining tunnels 1   Erythema (grade) 2   Thickness (grade) 1   Open skin surface (grade) 1   Tunnels (grade) 1       Left Thigh 12/3/2020   # of inflamed nodules 1   # of abcesses 0   # of draining tunnels 0   Erythema (grade) 1   Thickness (grade) 1   Open skin surface (grade) 0   Tunnels (grade) 0       No flowsheet data found.    Back 10/7/2021   # of inflamed nodules 1   Erythema (grade) 1   Thickness (grade) 1       Buttocks 7/7/2021   Pilonidal Disease? -   Pioderma Gangrenosum? No   Cutaneous Crohn's? No       HS Data  HS Exam Data 3/4/2021 7/7/2021 10/7/2021   LC Type - LC1 LC1   Clinical Subtypes - Regular type Regular type   Acne? Yes Yes Yes   Acne Comments scalp, but mild - -   Dissecting Cellulitis? No No No   Visual analogue score (0-100) - 45 -   Total Rodney Stage II II -   Total Inflammatory Nodules 2 4 8   Total Abcesses 0 0 0   Total Draining Tunnels 0 4 4   Total Abscess and Nodule Count 2 4 8   IHS4 Score  2 20 24   Total  HASI Score 6 57 42   HS-PGA - 3 -     Medications:  Current Outpatient Medications   Medication     benzoyl peroxide (PANOXYL) 10 % external liquid     fluorometholone (FML LIQUIFILM) 0.1 % ophthalmic suspension     glycopyrrolate (ROBINUL) 1 MG tablet     inFLIXimab (REMICADE) 100 MG injection     Vitamin D3 (CHOLECALCIFEROL) 25 mcg (1000 units) tablet     chlorhexidine (HIBICLENS) 4 % liquid     vitamin D3 (CHOLECALCIFEROL) 1.25 MG (09220 UT) capsule     Current Facility-Administered Medications   Medication     lidocaine 1% with EPINEPHrine 1:100,000 injection 3 mL     triamcinolone acetonide (KENALOG-10) injection 10 mg      Past Medical History:   Patient Active Problem List   Diagnosis     Hidradenitis suppurativa     No past medical history on file.     CC Referred Self, MD  No address on file on close of this encounter.

## 2021-10-07 NOTE — PROGRESS NOTES
This is a recent snapshot of the patient's [facility] medical record.  For current drug dose and complete information and questions, call [phone] or In Basket pool, fv home infusion ([id number])  CSN Number:  [id number]

## 2021-10-07 NOTE — LETTER
10/7/2021       RE: Margarito Sofia  [address]  [place] [address]     Dear Colleague,    Thank you for referring your patient, Margarito Sofia, to the [facility] [place] [facility]. Please see a copy of my visit note below.    [facility] Dermatology Note  Encounter Date: Oct 7, 2021  Office Visit     Dermatology Problem List:  1. Hidradenitis suppurativa.  - current recommended tx: infliximab [id number] with 40mg methylpred, ILK to two sites 3/4/21, referred to plastics 10/7/2021   - Patient due for next TB Quantiferon test in August of 2021  - Labs done 8/2020  -past tx: has been on ABx for acne (incl Bactrim), clindamycin, rifampin, prednisone, bactrim, methotrexate 15mg (discontinued Summer 2020 due to elevated LFTs)  - Gaine da lot of weight recently will hold steroids  - Benzoyl peroxide wash  - If neck lesion still acting up next time will plan for excision     2. Acne vulgaris  -Has tried Accutane and a number of antibiotics   3. Hx of atypical nevus - ~2005 - removed in [place] -patient will bring us records at next visit  4. Hyperhydrosis  - Glycopyrrolate 1mg BID   ____________________________________________    Assessment & Plan:  # Hidradenitis suppurativa, Rodney stage II.  - Pt still with severe disease as measure by IHS4 but only slight effect on QOL  - Continue infliximab 5 mg/kg every 4 weeks, will check antibodies. Consider increasing dose.   - Referral to plastics for excision.   - Weight gain improved after hold IV steroids with infusion     # Hx of atypical nevus   - FBSE at follow-up  - Will follow-up at next visit regarding prior records.    Follow-up: 8 weeks visa phone    Staff, Medical Student and Scribe:   DAISY MOORE, MS3    Scribe Disclosure:  I, Michelle Burton, am serving as a scribe to document services personally performed by Dennis Denny MD  based on data collection and the provider's statements to me.     Provider Disclosure:   The documentation recorded by the scribe accurately reflects the services I personally performed and the decisions made by me.    Dennis Denny MD, FAAD    Departments of Internal Medicine and Dermatology  [facility]  [phone]      Staff Physician:  I was present with the medical student who participated in the service and in the documentation of the note. I have verified the history and personally performed the physical exam and medical decision making. I agree with the assessment and plan of care as documented in the note.     Dennis Denny MD    Department of Dermatology  [facility]: Phone: [phone], Fax: [phone]  10/7/2021        ____________________________________________    CC: Derm Problem (Jarrod is here to follow up on HS. Jarrod has new areas that are mild)    HPI:  Mr. Margarito Sofia is a(n) 35 year old male who presents today as a return patient for HS 3 month follow up.    Last seen 6/10/21 by me, at which time the patient continued but increased dose of infliximab 5 mg/kg for treatment of HS.     He feels his HS is relatively stable, though he has gotten flares on the neck and axillae that have been extremely bothersome. He notes in particular the right axilla and right inframammary fold have developed draining lesions. His neck and left axilla lesions continue to drain and are extremely uncomfortable. He has noticed that since switching to infliximab every 4 weeks with the increased dose this has helped prevent the flares he had been getting in the week leading up to his injections in the past. He continues to use BP every 2-3 days. Today he would like to discuss potential excisions. He has gotten these before and though his HS lesions tend to recur (generally a year or so  after) he feels the severity of the lesions he has now might warrant the procedure.     Patient is otherwise feeling well, without additional skin concerns.    HS Nurse Assessment    Nurse Assessment Data 6/10/2021 10/7/2021   Over the past month, about how many recurrent or new boils have you had? 2 2 recurring 2 new   Over the past week, how many dressing changes do you do each day? 0 1   Over the past week, has your wound drainage been: Mild Mild   Rate your HS overall from 0-10 (0 = no disease, 10 = worst) over the past week:  0 4   Rate your pain score from 0-10 (0 = no disease, 10 = worst) for the most painful/symptomatic lesion in the past week:  0 - No Pain 2   Over the past week, how much has HS influenced your quality of life? slightly slightly     Labs Reviewed:  N/A    Physical Exam:  Vitals: There were no vitals taken for this visit.  SKIN: Focused examination of neck, axillae, chest, abdomen and groin was performed.  - Erythematous nodules with open sinuses, tracts and scarring in the left axilla   - milder erythema with fewer nodules and sinuses in the right axilla  - violaceous ill defined plaque and erythematous nodule on the posterior neck   - erythematous papule and linear plaque along left lower back intertriginous fold  -erythematous ill defined plaque on the right inframammary fold   -scattered pedunculated skin colored papules around the neck, axillae, and abdomen  - No other lesions of concern on areas examined.     HS Exam  Head/Neck 10/7/2021   # of inflamed nodules 0   # of abcesses 0   # of draining tunnels 1   Erythema (grade) 2   Thickness (grade) 3   Open skin surface (grade) 1   Tunnels (grade) 1       Left Axilla 10/7/2021   # of inflamed nodules 2   # of abcesses 0   # of draining tunnels 2   Erythema (grade) 2   Thickness (grade) 2   Open skin surface (grade) 2   Tunnels (grade) 2       Right Axilla 10/7/2021   # of inflamed nodules 3   # of abcesses 0   # of draining tunnels 1    Erythema (grade) 2   Thickness (grade) 1   Open skin surface (grade) 1   Tunnels (grade) 0       Chest 10/7/2021   # of inflamed nodules 2   # of abcesses 0   # of draining tunnels 0   Erythema (grade) 2   Thickness (grade) 1   Open skin surface (grade) 0   Tunnels (grade) 0       No flowsheet data found.     Pubis/Genital 7/7/2021   # of inflamed nodules 1   # of abcesses 0   # of draining tunnels 1   Erythema (grade) 2   Thickness (grade) 1   Open skin surface (grade) 1   Tunnels (grade) 1       Left Thigh 12/3/2020   # of inflamed nodules 1   # of abcesses 0   # of draining tunnels 0   Erythema (grade) 1   Thickness (grade) 1   Open skin surface (grade) 0   Tunnels (grade) 0       No flowsheet data found.    Back 10/7/2021   # of inflamed nodules 1   Erythema (grade) 1   Thickness (grade) 1       Buttocks 7/7/2021   Pilonidal Disease? -   Pioderma Gangrenosum? No   Cutaneous Crohn's? No       HS Data  HS Exam Data 3/4/2021 7/7/2021 10/7/2021   LC Type - LC1 LC1   Clinical Subtypes - Regular type Regular type   Acne? Yes Yes Yes   Acne Comments scalp, but mild - -   Dissecting Cellulitis? No No No   Visual analogue score (0-100) - 45 -   Total Rodney Stage II II -   Total Inflammatory Nodules 2 4 8   Total Abcesses 0 0 0   Total Draining Tunnels 0 4 4   Total Abscess and Nodule Count 2 4 8   IHS4 Score  2 20 24   Total  HASI Score 6 57 42   HS-PGA - 3 -     Medications:  Current Outpatient Medications   Medication     benzoyl peroxide (PANOXYL) 10 % external liquid     fluorometholone (FML LIQUIFILM) 0.1 % ophthalmic suspension     glycopyrrolate (ROBINUL) 1 MG tablet     inFLIXimab (REMICADE) 100 MG injection     Vitamin D3 (CHOLECALCIFEROL) 25 mcg (1000 units) tablet     chlorhexidine (HIBICLENS) 4 % liquid     vitamin D3 (CHOLECALCIFEROL) 1.25 MG (85334 UT) capsule     Current Facility-Administered Medications   Medication     lidocaine 1% with EPINEPHrine 1:100,000 injection 3 mL     triamcinolone acetonide  (KENALOG-10) injection 10 mg      Past Medical History:   Patient Active Problem List   Diagnosis     Hidradenitis suppurativa     No past medical history on file.     CC Referred Self, MD  No address on file on close of this encounter.      Again, thank you for allowing me to participate in the care of your patient.      Sincerely,    Dennis Denny MD

## 2021-10-09 ENCOUNTER — HEALTH MAINTENANCE LETTER (OUTPATIENT)
Age: 36
End: 2021-10-09

## 2021-10-12 ENCOUNTER — TELEPHONE (OUTPATIENT)
Dept: PLASTIC SURGERY | Facility: CLINIC | Age: 36
End: 2021-10-12

## 2021-10-12 NOTE — TELEPHONE ENCOUNTER
Called pt back and scheduled a new Pt consult with Dr. Ivy, 10/27/21 at 8 am.  Pt aware of time date and location.  No further questions.

## 2021-10-13 NOTE — TELEPHONE ENCOUNTER
FUTURE VISIT INFORMATION      FUTURE VISIT INFORMATION:    Date: 10/27/21    Time: 8:00am    Location: [facility]  REFERRAL INFORMATION:    Referring provider:  Dr. Denny    Referring providers clinic:  [facility]    Reason for visit/diagnosis   Hidradenitis suppurativa     RECORDS REQUESTED FROM:       Clinic name Comments Records Status Imaging Status   [facility] OV/referral 10/7/21 EPIC

## 2021-10-15 ENCOUNTER — HOME INFUSION (PRE-WILLOW HOME INFUSION) (OUTPATIENT)
Dept: PHARMACY | Facility: CLINIC | Age: 36
End: 2021-10-15

## 2021-10-27 ENCOUNTER — PRE VISIT (OUTPATIENT)
Dept: SURGERY | Facility: CLINIC | Age: 36
End: 2021-10-27

## 2021-10-27 ENCOUNTER — OFFICE VISIT (OUTPATIENT)
Dept: PLASTIC SURGERY | Facility: CLINIC | Age: 36
End: 2021-10-27
Payer: COMMERCIAL

## 2021-10-27 ENCOUNTER — HOME INFUSION (PRE-WILLOW HOME INFUSION) (OUTPATIENT)
Dept: PHARMACY | Facility: CLINIC | Age: 36
End: 2021-10-27

## 2021-10-27 DIAGNOSIS — L73.2 HIDRADENITIS SUPPURATIVA: Primary | ICD-10-CM

## 2021-10-27 PROCEDURE — 99203 OFFICE O/P NEW LOW 30 MIN: CPT | Performed by: PLASTIC SURGERY

## 2021-10-27 RX ORDER — CEFAZOLIN SODIUM IN 0.9 % NACL 3 G/100 ML
3 INTRAVENOUS SOLUTION, PIGGYBACK (ML) INTRAVENOUS SEE ADMIN INSTRUCTIONS
Status: CANCELLED | OUTPATIENT
Start: 2021-10-27

## 2021-10-27 RX ORDER — CEFAZOLIN SODIUM IN 0.9 % NACL 3 G/100 ML
3 INTRAVENOUS SOLUTION, PIGGYBACK (ML) INTRAVENOUS
Status: CANCELLED | OUTPATIENT
Start: 2021-10-27

## 2021-10-27 ASSESSMENT — PAIN SCALES - GENERAL: PAINLEVEL: NO PAIN (0)

## 2021-10-27 NOTE — NURSING NOTE
Chief Complaint   Patient presents with     Consult     Jarrod, is being seen today for a consult regarding HS, referred by . condition on the back of neck and left axillary area, as reported by patient.       There were no vitals filed for this visit.    There is no height or weight on file to calculate BMI.    Per Provider.  Xi Powers LPN

## 2021-10-27 NOTE — LETTER
10/27/2021       RE: Margarito Sofia  [address]  [facility] [address]     Dear Colleague,    Thank you for referring your patient, Margarito Sofia, to the [facility] PLASTIC AND RECONSTRUCTIVE SURGERY CLINIC [place] at [facility]. Please see a copy of my visit note below.    CONSULT NOTE    REFERRING PROVIDER:  Dennis Denny MD    PRESENTING COMPLAINT:  Consultation for hidradenitis suppurativa of the posterior neck and left axilla.    HISTORY OF PRESENTING COMPLAINT:  Jarrod is 35 years old.  He has been diagnosed with hidradenitis suppurativa for a few years.  He has been under the care of Dr. Denny.  He has been getting immunomodulating agents as well as other standard of care.  He continues to have some problems in the posterior neck and left axillary region despite adequate medical management and has been sent to me for further surgical management.  With regards to his armpit and his neck, he has had multiple debridements in the past.  Last debridement was 7 months ago.  He is off all antibiotics.    PAST MEDICAL HISTORY:  Hidradenitis suppurativa.    PAST SURGICAL HISTORY:  Nil.    MEDICATIONS:  Remicade.    ALLERGIES:  Nil.    SOCIAL HISTORY:  Does not smoke, socially drinks.  Lives in [place].  Is a .    REVIEW OF SYSTEMS:  Denies chest pain, shortness of breath, MI, CVA, DVT and PE.    PHYSICAL EXAMINATION:  Vital signs stable.  He is afebrile, in no obvious distress.  He is 6 feet, 335 pounds, BMI of 45 kg/m2.  On examination of his left axilla, he has Rodney grade 3 or 4 hidradenitis suppurativa.  No obvious cellulitis.  Under control right now.  This encompasses at least 80% of his axilla.  His back is free of disease.  His posterior neck has an area about 5 cm long and about 2 cm wide of some hidradenitis, non-cellulitic.  He has had previous excisions of the area.    ASSESSMENT AND PLAN:  Based on the above findings,  a diagnosis of hidradenitis suppurativa of the posterior neck and left axilla was made.  I had a rosy discussion with the patient about his findings.  I think he is a good candidate for wide local excision and primary closure of the neck and wide local excision and TDAP flap of the left axilla.  I explained to him the concept behind this.  This could be done simultaneously.  All risks, benefits, and alternatives of the procedures including pain, infection, bleeding, scarring, asymmetry, seromas, hematomas, wound breakdown, wound dehiscence, injury to deeper structures, recurrence, wound healing complications of 100% loss of the flap, DVT, PE, MI, CVA, pneumonia, renal failure and death.  He understood everything and wants to proceed.  Consent obtained.  Photographs obtained.  We will try and schedule this as soon as possible.  He will need to discuss with Dr. Denny the management of the Remicade perioperatively.  All questions were answered.  He was happy with the visit.     Total time spent with chart review, visit itself and post-visit paperwork was 30 minutes.    MICHELE Ivy MD    cc:  Dennis Denny MD  [address]  [address]            Again, thank you for allowing me to participate in the care of your patient.      Sincerely,    SHELBY Ivy MD

## 2021-10-27 NOTE — PROGRESS NOTES
CONSULT NOTE    REFERRING PROVIDER:  Dennis Denny MD    PRESENTING COMPLAINT:  Consultation for hidradenitis suppurativa of the posterior neck and left axilla.    HISTORY OF PRESENTING COMPLAINT:  Jarrod is 35 years old.  He has been diagnosed with hidradenitis suppurativa for a few years.  He has been under the care of Dr. Denny.  He has been getting immunomodulating agents as well as other standard of care.  He continues to have some problems in the posterior neck and left axillary region despite adequate medical management and has been sent to me for further surgical management.  With regards to his armpit and his neck, he has had multiple debridements in the past.  Last debridement was 7 months ago.  He is off all antibiotics.    PAST MEDICAL HISTORY:  Hidradenitis suppurativa.    PAST SURGICAL HISTORY:  Nil.    MEDICATIONS:  Remicade.    ALLERGIES:  Nil.    SOCIAL HISTORY:  Does not smoke, socially drinks.  Lives in [place].  Is a .    REVIEW OF SYSTEMS:  Denies chest pain, shortness of breath, MI, CVA, DVT and PE.    PHYSICAL EXAMINATION:  Vital signs stable.  He is afebrile, in no obvious distress.  He is 6 feet, 335 pounds, BMI of 45 kg/m2.  On examination of his left axilla, he has Rodney grade 3 or 4 hidradenitis suppurativa.  No obvious cellulitis.  Under control right now.  This encompasses at least 80% of his axilla.  His back is free of disease.  His posterior neck has an area about 5 cm long and about 2 cm wide of some hidradenitis, non-cellulitic.  He has had previous excisions of the area.    ASSESSMENT AND PLAN:  Based on the above findings, a diagnosis of hidradenitis suppurativa of the posterior neck and left axilla was made.  I had a rosy discussion with the patient about his findings.  I think he is a good candidate for wide local excision and primary closure of the neck and wide local excision and TDAP flap of the left axilla.  I explained to him the concept behind this.  This  could be done simultaneously.  All risks, benefits, and alternatives of the procedures including pain, infection, bleeding, scarring, asymmetry, seromas, hematomas, wound breakdown, wound dehiscence, injury to deeper structures, recurrence, wound healing complications of 100% loss of the flap, DVT, PE, MI, CVA, pneumonia, renal failure and death.  He understood everything and wants to proceed.  Consent obtained.  Photographs obtained.  We will try and schedule this as soon as possible.  He will need to discuss with Dr. Denny the management of the Remicade perioperatively.  All questions were answered.  He was happy with the visit.     Total time spent with chart review, visit itself and post-visit paperwork was 30 minutes.    MICHELE Ivy MD    cc:  Dennis Denny MD  [address]  [address]

## 2021-10-27 NOTE — LETTER
November 1, 2021    RE:  Margarito Sofia                              [address] [address]   [place] [facility] [place] [address]      To Whom It May Concern:    This letter is written to document that Margarito Sofia is under the medical care of Dr. SHELBY Ivy of the Division of Reconstructive and Plastic Surgery at [facility].    Margarito will undergo surgery on 12/27/21 with . Margarito hopes to return to work on 1/3/22. At that time he will will have no restrictions.    Please take the above information into consideration when determining Margarito's future work schedule. If there are questions or concerns regarding the plan of care, please contact the Reconstructive and Plastic Surgery Clinic at [phone].    Sincerely,    SHELBY Ivy MD  Division of Reconstructive and Plastic Surgery  Department of Surgery

## 2021-10-28 ENCOUNTER — TELEPHONE (OUTPATIENT)
Dept: DERMATOLOGY | Facility: CLINIC | Age: 36
End: 2021-10-28
Payer: COMMERCIAL

## 2021-10-28 ENCOUNTER — DOCUMENTATION ONLY (OUTPATIENT)
Dept: SURGERY | Facility: CLINIC | Age: 36
End: 2021-10-28

## 2021-10-28 NOTE — TELEPHONE ENCOUNTER
LVM for pt to contact clinic to get scheduled. Dr Denny would like phone follow up within 4 wks. See note below if needed.

## 2021-10-28 NOTE — TELEPHONE ENCOUNTER
FUTURE VISIT INFORMATION      SURGERY INFORMATION:    Date: 12/27/21    Location: uu or    Surgeon:  SHELBY Ivy MD    Anesthesia Type:  general    Procedure: Left axillary and posterior next wound excision and local flap closure, SPY    Consult: ov 10/27/21    RECORDS REQUESTED FROM:       Primary Care Provider: None

## 2021-10-28 NOTE — TELEPHONE ENCOUNTER
----- Message from Dennis Denny MD sent at 10/27/2021 11:15 AM CDT -----  Regarding: RE: Update  Please schedule phone visit to prep him for surgery. Schedule within in next 4 weeks  Dennis  ----- Message -----  From: SHELBY Ivy MD  Sent: 10/27/2021   8:16 AM CDT  To: Jin Santana, EMT, FATUMA REYES, #  Subject: Update                                           Hi Dennis Conteh, thanks for the consult! Happy to plan excision and closure. Please manage the Remicaide timings jack-operatively as always :))    K/J, orders placed.    Thanks    Bethany

## 2021-10-28 NOTE — PROGRESS NOTES
RNCC: Farshad Eagle; [phone]    Surgery is scheduled with Dr. Ivy on 12/27 at [place].  Scheduled per patient.    H&P to be completed by PAC: Scheduled in person on 12/8    COVID-19 test: 12/24 at [facility]    Post-op: 1/12 as a in person visit.    Additional appointments (pre-op/post-op):   Pre-surgical consult on 12/8 at 10:30 AM    The RN completed the education regarding the surgery.     Patient will receive surgery arrival and start time from PAC.    The surgery packet was sent via US mail. and sent via [employer].    Patient will complete COVID-19 test that was scheduled by surgical coordinator 2-4 days prior to surgery.     I sent a MyChart to confirm the information above, will call if the patient requests.

## 2021-10-29 ENCOUNTER — HOME INFUSION (PRE-WILLOW HOME INFUSION) (OUTPATIENT)
Dept: PHARMACY | Facility: CLINIC | Age: 36
End: 2021-10-29
Payer: COMMERCIAL

## 2021-10-29 ENCOUNTER — LAB REQUISITION (OUTPATIENT)
Dept: LAB | Facility: CLINIC | Age: 36
End: 2021-10-29
Payer: COMMERCIAL

## 2021-10-29 ENCOUNTER — DOCUMENTATION ONLY (OUTPATIENT)
Dept: PHARMACY | Facility: CLINIC | Age: 36
End: 2021-10-29

## 2021-10-29 DIAGNOSIS — L73.2 HIDRADENITIS SUPPURATIVA: ICD-10-CM

## 2021-10-29 PROCEDURE — 84999 UNLISTED CHEMISTRY PROCEDURE: CPT | Performed by: DERMATOLOGY

## 2021-10-29 PROCEDURE — 80230 DRUG ASSAY INFLIXIMAB: CPT | Performed by: DERMATOLOGY

## 2021-10-29 PROCEDURE — 82397 CHEMILUMINESCENT ASSAY: CPT | Performed by: DERMATOLOGY

## 2021-10-29 NOTE — PROGRESS NOTES
Skilled Nurse visit in the [facility] to administer Remicade 700 mg IV. No recent elevated temperature, fever, chills, productive cough, coughing for 3 weeks or longer or hemoptysis, abnormal vital signs, night sweats, chest pain. No decrease in appetite, unexplained weight loss or fatigue.  No other new onset medical symptoms.  Current weight 340 lbs.  PIV placed right AC, 2 attempts.  Pre medicated with Tylenol 650 mg po and Benadryl 50 mg po. Labs drawn pre-infusion: Infliximab level. Infusion completed without complication or reaction. Pt reports therapy is helping in some of his abcess areas in managing symptoms related to therapy.     Yakelin Huber, CONCEPCIONN, RN  [phone]  [email or web address]

## 2021-11-01 LAB
Lab: NORMAL
PERFORMING LABORATORY: NORMAL
SPECIMEN STATUS: NORMAL
TEST NAME: NORMAL

## 2021-11-03 LAB — MISCELLANEOUS TEST 1 (ARUP): NORMAL

## 2021-11-23 ENCOUNTER — HOME INFUSION (PRE-WILLOW HOME INFUSION) (OUTPATIENT)
Dept: PHARMACY | Facility: CLINIC | Age: 36
End: 2021-11-23
Payer: COMMERCIAL

## 2021-11-26 ENCOUNTER — HOME INFUSION (PRE-WILLOW HOME INFUSION) (OUTPATIENT)
Dept: PHARMACY | Facility: CLINIC | Age: 36
End: 2021-11-26
Payer: COMMERCIAL

## 2021-11-29 NOTE — PROGRESS NOTES
This is a recent snapshot of the patient's [facility] medical record.  For current drug dose and complete information and questions, call [phone] or In Basket pool, fv home infusion ([id number])  CSN Number:  [id number]

## 2021-11-30 ENCOUNTER — HOME INFUSION (PRE-WILLOW HOME INFUSION) (OUTPATIENT)
Dept: PHARMACY | Facility: CLINIC | Age: 36
End: 2021-11-30
Payer: COMMERCIAL

## 2021-12-02 ENCOUNTER — VIRTUAL VISIT (OUTPATIENT)
Dept: DERMATOLOGY | Facility: CLINIC | Age: 36
End: 2021-12-02
Payer: COMMERCIAL

## 2021-12-02 DIAGNOSIS — L73.2 HIDRADENITIS SUPPURATIVA: Primary | ICD-10-CM

## 2021-12-02 PROCEDURE — 99214 OFFICE O/P EST MOD 30 MIN: CPT | Mod: TEL | Performed by: DERMATOLOGY

## 2021-12-02 RX ORDER — SULFAMETHOXAZOLE/TRIMETHOPRIM 800-160 MG
1 TABLET ORAL 2 TIMES DAILY
Qty: 180 TABLET | Refills: 1 | Status: SHIPPED | OUTPATIENT
Start: 2021-12-02 | End: 2022-03-03

## 2021-12-02 NOTE — LETTER
12/2/2021       RE: Margarito Sofia  [address]  [place] [address]     Dear Colleague,    Thank you for referring your patient, Margarito Sofia, to the [facility] DERMATOLOGY CLINIC [place] at [facility]. Please see a copy of my visit note below.    [facility] Dermatology Note  Encounter Date: Dec 2, 2021  Store-and-Forward and Telephone ([phone]). Location of teledermatologist: M Telephone visit  Total time: 15 min      Dermatology Problem List:  1. Hidradenitis suppurativa.  - current recommended tx: infliximab [id number] with 40mg methylpred, ILK to two sites 3/4/21, referred to plastics 10/7/2021   - Patient due for next TB Quantiferon test in August of 2021  - Labs done 8/2020  -past tx: has been on ABx for acne (incl Bactrim), clindamycin, rifampin, prednisone, bactrim, methotrexate 15mg (discontinued Summer 2020 due to elevated LFTs)  - Gaine da lot of weight recently will hold steroids  - Benzoyl peroxide wash  - If neck lesion still acting up next time will plan for excision     2. Acne vulgaris  -Has tried Accutane and a number of antibiotics   3. Hx of atypical nevus - ~2005 - removed in [place] -patient will bring us records at next visit  4. Hyperhydrosis  - Glycopyrrolate 1mg BID    ____________________________________________    Assessment & Plan:   # Hidradenitis suppurativa, Rashaun stage II.  - Pt scheduled for surgery on December 27th   - Will begin bactrim for antibacterial coverage before surgery   - recommend continuing BP wash   - will continue infliximab before surgery   - discussed that infliximab levels were appropriate 13.4  - nutrition referral submitted again today      # Hx of atypical nevus   - FBSE at follow-up    Follow-up: 12 weeks, sooner if needed.      Staff and Medical Student: Verónica Guevara, MS3    Staff Physician:  I was present with the medical student who participated in the  service and in the documentation of the note. I have verified the history and medical decision making. I agree with the assessment and plan of care as documented in the note.       Dennis Denny MD    Department of Dermatology  [facility]: Phone: [phone], Fax: [phone]  12/12/2021          ____________________________________________    CC:  Preparing for HS surgery    HPI:  Mr. Margarito Sofia is a(n) 36 year old male who presents today as a return patient for HS. The patient was last seen by myself on 10/7/21 at which point he was continue on infliximab 5 mg/kg for treatment of HS. He was also referred to plastics for excision. Notably, his weight gain improved after holding IV steroids with his infusions.     Patient states he has been doing well since his last visit approximately two months ago. He states his neck is about the same and that he has a new nodule in L armpit that started 2 weeks ago, initially drained and was tender, but is now no longer tender or draining. He is still doing infliximab infusions every 4 weeks and uses BP wash 1-2 weekly. He is scheduled for surgery on December 27th and they wanted to have his monthly infusion done before this procedure, so he has moved his infusion date for December up by a few days. He would also like to discuss the results of his blood test looking for antibodies against infliximab and would like another referral put in for a dietician given he did not receive the one he thought he was going to get at his last visit.     HS Nurse Assessment    Nurse Assessment Data 6/10/2021 10/7/2021 12/2/2021   Over the past month, about how many recurrent or new boils have you had? 2 2 recurring 2 new 1   Over the past week, how many dressing changes do you do each day? 0 1 0   Over the past week, has your wound drainage been: Mild Mild Mild   Rate your HS overall from 0-10  (0 = no disease, 10 = worst) over the past week:  0 4 5   Rate your pain score from 0-10 (0 = no disease, 10 = worst) for the most painful/symptomatic lesion in the past week:  0 - No Pain 2 4   Over the past week, how much has HS influenced your quality of life? slightly slightly slightly         Patient is otherwise feeling well, without additional skin concerns.    Physical Exam:  Vitals: There were no vitals taken for this visit.  SKIN: Teledermatology photos were reviewed; image quality and interpretability: acceptable. Image date: 10/27/21.  - pleasant, alert and interactive over the phone.  - No other lesions of concern on areas examined.     Medications:  Current Outpatient Medications   Medication     benzoyl peroxide (PANOXYL) 10 % external liquid     chlorhexidine (HIBICLENS) 4 % liquid     fluorometholone (FML LIQUIFILM) 0.1 % ophthalmic suspension     glycopyrrolate (ROBINUL) 1 MG tablet     inFLIXimab (REMICADE) 100 MG injection     vitamin D3 (CHOLECALCIFEROL) 1.25 MG (72912 UT) capsule     Vitamin D3 (CHOLECALCIFEROL) 25 mcg (1000 units) tablet     Current Facility-Administered Medications   Medication     lidocaine 1% with EPINEPHrine 1:100,000 injection 3 mL     triamcinolone acetonide (KENALOG-10) injection 10 mg      Past Medical/Surgical History:   Patient Active Problem List   Diagnosis     Hidradenitis suppurativa     Morbid obesity (H)     CC No referring provider defined for this encounter. on close of this encounter.        Again, thank you for allowing me to participate in the care of your patient.      Sincerely,    Dennis Denny MD

## 2021-12-02 NOTE — LETTER
Date:December 14, 2021      Provider requested that no letter be sent. Do not send.       [facility]

## 2021-12-02 NOTE — PROGRESS NOTES
[facility] Dermatology Note  Encounter Date: Dec 2, 2021  Store-and-Forward and Telephone ([phone]). Location of teledermatologist: M Telephone visit  Total time: 15 min      Dermatology Problem List:  1. Hidradenitis suppurativa.  - current recommended tx: infliximab [id number] with 40mg methylpred, ILK to two sites 3/4/21, referred to plastics 10/7/2021   - Patient due for next TB Quantiferon test in August of 2021  - Labs done 8/2020  -past tx: has been on ABx for acne (incl Bactrim), clindamycin, rifampin, prednisone, bactrim, methotrexate 15mg (discontinued Summer 2020 due to elevated LFTs)  - Gaine da lot of weight recently will hold steroids  - Benzoyl peroxide wash  - If neck lesion still acting up next time will plan for excision     2. Acne vulgaris  -Has tried Accutane and a number of antibiotics   3. Hx of atypical nevus - ~2005 - removed in [place] -patient will bring us records at next visit  4. Hyperhydrosis  - Glycopyrrolate 1mg BID    ____________________________________________    Assessment & Plan:   # Hidradenitis suppurativa, Rodney stage II.  - Pt scheduled for surgery on December 27th   - Will begin bactrim for antibacterial coverage before surgery   - recommend continuing BP wash   - will continue infliximab before surgery   - discussed that infliximab levels were appropriate 13.4  - nutrition referral submitted again today      # Hx of atypical nevus   - FBSE at follow-up    Follow-up: 12 weeks, sooner if needed.      Staff and Medical Student: Verónica Guevara, MS3    Staff Physician:  I was present with the medical student who participated in the service and in the documentation of the note. I have verified the history and medical decision making. I agree with the assessment and plan of care as documented in the note.       Dennis Denny MD    Department of Dermatology  [facility] Clinical Surgery  Center: Phone: [phone], Fax: [phone]  12/12/2021          ____________________________________________    CC:  Preparing for HS surgery    HPI:  Mr. Margarito Sofia is a(n) 36 year old male who presents today as a return patient for HS. The patient was last seen by myself on 10/7/21 at which point he was continue on infliximab 5 mg/kg for treatment of HS. He was also referred to plastics for excision. Notably, his weight gain improved after holding IV steroids with his infusions.     Patient states he has been doing well since his last visit approximately two months ago. He states his neck is about the same and that he has a new nodule in L armpit that started 2 weeks ago, initially drained and was tender, but is now no longer tender or draining. He is still doing infliximab infusions every 4 weeks and uses BP wash 1-2 weekly. He is scheduled for surgery on December 27th and they wanted to have his monthly infusion done before this procedure, so he has moved his infusion date for December up by a few days. He would also like to discuss the results of his blood test looking for antibodies against infliximab and would like another referral put in for a dietician given he did not receive the one he thought he was going to get at his last visit.     HS Nurse Assessment    Nurse Assessment Data 6/10/2021 10/7/2021 12/2/2021   Over the past month, about how many recurrent or new boils have you had? 2 2 recurring 2 new 1   Over the past week, how many dressing changes do you do each day? 0 1 0   Over the past week, has your wound drainage been: Mild Mild Mild   Rate your HS overall from 0-10 (0 = no disease, 10 = worst) over the past week:  0 4 5   Rate your pain score from 0-10 (0 = no disease, 10 = worst) for the most painful/symptomatic lesion in the past week:  0 - No Pain 2 4   Over the past week, how much has HS influenced your quality of life? slightly slightly slightly         Patient is otherwise feeling  well, without additional skin concerns.    Physical Exam:  Vitals: There were no vitals taken for this visit.  SKIN: Teledermatology photos were reviewed; image quality and interpretability: acceptable. Image date: 10/27/21.  - pleasant, alert and interactive over the phone.  - No other lesions of concern on areas examined.     Medications:  Current Outpatient Medications   Medication     benzoyl peroxide (PANOXYL) 10 % external liquid     chlorhexidine (HIBICLENS) 4 % liquid     fluorometholone (FML LIQUIFILM) 0.1 % ophthalmic suspension     glycopyrrolate (ROBINUL) 1 MG tablet     inFLIXimab (REMICADE) 100 MG injection     vitamin D3 (CHOLECALCIFEROL) 1.25 MG (21140 UT) capsule     Vitamin D3 (CHOLECALCIFEROL) 25 mcg (1000 units) tablet     Current Facility-Administered Medications   Medication     lidocaine 1% with EPINEPHrine 1:100,000 injection 3 mL     triamcinolone acetonide (KENALOG-10) injection 10 mg      Past Medical/Surgical History:   Patient Active Problem List   Diagnosis     Hidradenitis suppurativa     Morbid obesity (H)     CC No referring provider defined for this encounter. on close of this encounter.

## 2021-12-02 NOTE — NURSING NOTE
Dermatology Rooming Note    Margarito Sofia's goals for this visit include:   Chief Complaint   Patient presents with     Derm Problem     HS follow up, things have been good, has only had one flare since last visit     Stephanie Galloway CMA on 12/2/2021 at 3:31 PM

## 2021-12-02 NOTE — PROGRESS NOTES
This is a recent snapshot of the patient's [facility] medical record.  For current drug dose and complete information and questions, call [phone] or In Basket pool, fv home infusion ([id number])  CSN Number:  [id number]

## 2021-12-03 ENCOUNTER — TELEPHONE (OUTPATIENT)
Dept: DERMATOLOGY | Facility: CLINIC | Age: 36
End: 2021-12-03
Payer: COMMERCIAL

## 2021-12-03 DIAGNOSIS — Z11.59 ENCOUNTER FOR SCREENING FOR OTHER VIRAL DISEASES: ICD-10-CM

## 2021-12-03 NOTE — TELEPHONE ENCOUNTER
Nutrition Education Scheduling Outreach #1:    Call to patient to schedule. Left message with phone number to call to schedule.    Plan for 2nd outreach attempt within 1 week.    Mi [facility]  Diabetes and Nutrition Scheduling

## 2021-12-06 ENCOUNTER — HOME INFUSION (PRE-WILLOW HOME INFUSION) (OUTPATIENT)
Dept: PHARMACY | Facility: CLINIC | Age: 36
End: 2021-12-06
Payer: COMMERCIAL

## 2021-12-08 ENCOUNTER — PRE VISIT (OUTPATIENT)
Dept: SURGERY | Facility: CLINIC | Age: 36
End: 2021-12-08

## 2021-12-08 ENCOUNTER — OFFICE VISIT (OUTPATIENT)
Dept: PLASTIC SURGERY | Facility: CLINIC | Age: 36
End: 2021-12-08
Payer: COMMERCIAL

## 2021-12-08 ENCOUNTER — ANESTHESIA EVENT (OUTPATIENT)
Dept: SURGERY | Facility: CLINIC | Age: 36
End: 2021-12-08

## 2021-12-08 ENCOUNTER — LAB (OUTPATIENT)
Dept: LAB | Facility: CLINIC | Age: 36
End: 2021-12-08
Payer: COMMERCIAL

## 2021-12-08 ENCOUNTER — OFFICE VISIT (OUTPATIENT)
Dept: SURGERY | Facility: CLINIC | Age: 36
End: 2021-12-08
Payer: COMMERCIAL

## 2021-12-08 VITALS
BODY MASS INDEX: 42.66 KG/M2 | OXYGEN SATURATION: 96 % | DIASTOLIC BLOOD PRESSURE: 89 MMHG | WEIGHT: 315 LBS | HEIGHT: 72 IN | SYSTOLIC BLOOD PRESSURE: 140 MMHG | HEART RATE: 99 BPM

## 2021-12-08 VITALS
SYSTOLIC BLOOD PRESSURE: 137 MMHG | DIASTOLIC BLOOD PRESSURE: 96 MMHG | RESPIRATION RATE: 20 BRPM | WEIGHT: 315 LBS | HEIGHT: 72 IN | OXYGEN SATURATION: 96 % | TEMPERATURE: 97.9 F | HEART RATE: 88 BPM | BODY MASS INDEX: 42.66 KG/M2

## 2021-12-08 DIAGNOSIS — Z01.818 PREOP EXAMINATION: ICD-10-CM

## 2021-12-08 DIAGNOSIS — L73.2 HIDRADENITIS SUPPURATIVA: ICD-10-CM

## 2021-12-08 DIAGNOSIS — Z01.818 PREOP EXAMINATION: Primary | ICD-10-CM

## 2021-12-08 DIAGNOSIS — L73.2 HIDRADENITIS SUPPURATIVA: Primary | ICD-10-CM

## 2021-12-08 LAB
ALBUMIN SERPL-MCNC: 3.3 G/DL (ref 3.4–5)
ALP SERPL-CCNC: 87 U/L (ref 40–150)
ALT SERPL W P-5'-P-CCNC: 71 U/L (ref 0–70)
ANION GAP SERPL CALCULATED.3IONS-SCNC: 8 MMOL/L (ref 3–14)
AST SERPL W P-5'-P-CCNC: 40 U/L (ref 0–45)
BILIRUB SERPL-MCNC: 0.5 MG/DL (ref 0.2–1.3)
BUN SERPL-MCNC: 12 MG/DL (ref 7–30)
CALCIUM SERPL-MCNC: 8.9 MG/DL (ref 8.5–10.1)
CHLORIDE BLD-SCNC: 104 MMOL/L (ref 94–109)
CO2 SERPL-SCNC: 28 MMOL/L (ref 20–32)
CREAT SERPL-MCNC: 0.93 MG/DL (ref 0.66–1.25)
ERYTHROCYTE [DISTWIDTH] IN BLOOD BY AUTOMATED COUNT: 13.4 % (ref 10–15)
GFR SERPL CREATININE-BSD FRML MDRD: >90 ML/MIN/1.73M2
GLUCOSE BLD-MCNC: 174 MG/DL (ref 70–99)
HCT VFR BLD AUTO: 46.8 % (ref 40–53)
HGB BLD-MCNC: 15.6 G/DL (ref 13.3–17.7)
MCH RBC QN AUTO: 29.4 PG (ref 26.5–33)
MCHC RBC AUTO-ENTMCNC: 33.3 G/DL (ref 31.5–36.5)
MCV RBC AUTO: 88 FL (ref 78–100)
PLATELET # BLD AUTO: 316 10E3/UL (ref 150–450)
POTASSIUM BLD-SCNC: 4 MMOL/L (ref 3.4–5.3)
PROT SERPL-MCNC: 8.1 G/DL (ref 6.8–8.8)
RBC # BLD AUTO: 5.3 10E6/UL (ref 4.4–5.9)
SODIUM SERPL-SCNC: 140 MMOL/L (ref 133–144)
WBC # BLD AUTO: 10.1 10E3/UL (ref 4–11)

## 2021-12-08 PROCEDURE — 99204 OFFICE O/P NEW MOD 45 MIN: CPT | Performed by: CLINICAL NURSE SPECIALIST

## 2021-12-08 PROCEDURE — 99213 OFFICE O/P EST LOW 20 MIN: CPT | Performed by: PLASTIC SURGERY

## 2021-12-08 PROCEDURE — 36415 COLL VENOUS BLD VENIPUNCTURE: CPT | Performed by: PATHOLOGY

## 2021-12-08 PROCEDURE — 85027 COMPLETE CBC AUTOMATED: CPT | Performed by: PATHOLOGY

## 2021-12-08 PROCEDURE — 80053 COMPREHEN METABOLIC PANEL: CPT | Performed by: PATHOLOGY

## 2021-12-08 ASSESSMENT — MIFFLIN-ST. JEOR
SCORE: 2554.21
SCORE: 2566.91

## 2021-12-08 ASSESSMENT — LIFESTYLE VARIABLES: TOBACCO_USE: 0

## 2021-12-08 ASSESSMENT — PAIN SCALES - GENERAL
PAINLEVEL: NO PAIN (0)
PAINLEVEL: NO PAIN (0)

## 2021-12-08 NOTE — LETTER
12/8/2021       RE: Margarito Sofia  [address]  [place] [address]     Dear Colleague,    Thank you for referring your patient, Margarito Sofia, to the [facility] PLASTIC AND RECONSTRUCTIVE SURGERY CLINIC [place] at [facility]. Please see a copy of my visit note below.    PRESENTING COMPLAINT:  Preoperative visit for upcoming posterior neck and left axillary hidradenitis suppurativa excision and local flap closure scheduled for 12/27/2021.    HISTORY OF PRESENTING COMPLAINT:  Mr. Sofia is 36 years old, here for his preop visit.  No change in history and physical exam.  He has met with Dr. Denny of Dermatology and his perioperative immunotherapy and antibiotic treatment has been discussed and planned.  He is seen preop clearance as well.    ASSESSMENT AND PLAN:  Based on above findings, a diagnosis hidradenitis suppurativa of the left axilla and posterior neck.  Here for preoperative visit for upcoming excision and closure flap closure was made.  Went over the planned procedure with the patient in detail.  If primary closure is possible we will do that.  Otherwise, we will do flap closures.  He understood.  All risks, benefits, and alternatives of the procedures including pain, infection, bleeding, scarring, asymmetry, seromas, hematomas, wound breakdown, wound dehiscence 100% guaranteed, flap loss, requirement of further surgeries, staged reconstructions, injury to deeper structures, DVT, PE, MI, CVA, pneumonia, renal failure and death were all explained.  He understood them all and wants to proceed.  I look forward to helping him out in the near future.    All visit was chaperoned by my nurse, Magi Tolliver.    Total time spent with chart review, visit itself and post-visit paperwork was 20 minutes.          Again, thank you for allowing me to participate in the care of your patient.      Sincerely,    SHELBY Ivy MD

## 2021-12-08 NOTE — PATIENT INSTRUCTIONS
Preparing for Your Surgery      Name:  Margarito Sofia   MRN:  [id number]   :  1985   Today's Date:  2021       Arriving for surgery:  Surgery date:  21  Arrival time:  08:40 a.m.    Restrictions due to COVID 19:       One visitor is allowed in the Pre Op area.       When you go into surgery, one visitor is allowed to wait in the Surgery Waiting Room       (provided there is enough space to social distance).         In hospital patients are allowed 1 visitor per day       The visitor may change daily     Visiting Hours: 8 am - 8:30 pm   No ill visitors.   All visitors must wear face mask.    [address] parking is available for anyone with mobility limitations or disabilities.  ([facility]  24 hours/ 7 days a week; [facility]  7 am- 3:30 pm, Mon- Fri)    Please come to:     [facility] [address]  [address] [place] [address]    - ? parking is available in front of the hospital      -    Please proceed to [address] on the 3rd floor. [phone]?     - ?If you are in need of directions, wheelchair or escort please stop at the Information Desk in the lobby.  Inform the information person that you are here for surgery; a wheelchair and escort to [address] will be provided.?     What can I eat or drink?  -  You may eat and drink normally for up to 8 hours before your surgery. (Until midnight the night before your surgery)  -  You may have clear liquids until 2 hours before surgery. (Until 08:40 a.m.)    Examples of clear liquids:  Water  Clear broth  Juices (apple, white grape, white cranberry  and cider) without pulp  Noncarbonated, powder based beverages  (lemonade and Clem-Aid)  Sodas (Sprite, 7-Up, ginger ale and seltzer)  Coffee or tea (without milk or cream)  Gatorade    -  No Alcohol for at least 24 hours before surgery     Which medicines can I take?    Hold Aspirin for 7 days before surgery.   Hold Multivitamins for 7 days before  surgery.  Hold Supplements for 7 days before surgery.  Hold Ibuprofen (Advil, Motrin) for 1 day before surgery--unless otherwise directed by surgeon.  Hold Naproxen (Aleve) for 4 days before surgery.  CONTINUE , Infliximab (Remicade) PER INFUSION SCHEDULE     -  PLEASE TAKE these medications the day of surgery:  Sulfamethoxazole-trimethoprim (Bactrim)    How do I prepare myself?  - Please take 2 showers before surgery using Scrubcare or Hibiclens soap.    Use this soap only from the neck to your toes.     Leave the soap on your skin for one minute--then rinse thoroughly.      You may use your own shampoo and conditioner; no other hair products.   - Please remove all jewelry and body piercings.  - No lotions, deodorants or fragrance.  - No makeup or fingernail polish.   - Bring your ID and insurance card.    -If you have a Deep Brain Stimulator, Spinal Cord Stimulator or any neuro stimulator device---you must bring the remote control to the hospital     - All patients are required to have a Covid-19 test within 4 days of surgery/procedure.      -Patients will be contacted by the [facility] scheduling team within 1 week of surgery to make an appointment.      - Patients may call the Scheduling team at [phone] if they have not been scheduled within 4 days of  surgery.      Questions or Concerns:    - For any questions regarding the day of surgery or your hospital stay, please contact the Pre Admission Nursing Office at [phone].       - If you have health changes between today and your surgery please call your surgeon.       For questions after surgery please call your surgeons office.

## 2021-12-08 NOTE — ANESTHESIA PREPROCEDURE EVALUATION
Anesthesia Pre-Procedure Evaluation    Patient: Margarito Sofia   MRN: [id number] : 1985        Preoperative Diagnosis: * No surgery found *    Procedure :   PAC EVALUATION       Past Medical History:   Diagnosis Date     Hidradenitis suppurativa      Morbid obesity (H)       History reviewed. No pertinent surgical history.   No Known Allergies   Social History     Tobacco Use     Smoking status: Never Smoker     Smokeless tobacco: Never Used   Substance Use Topics     Alcohol use: Yes     Comment: socially      Wt Readings from Last 1 Encounters:   21 (!) 160.9 kg (354 lb 11.2 oz)        Anesthesia Evaluation   Pt has not had prior anesthetic         ROS/MED HX  ENT/Pulmonary:     (+) NICKY risk factors, snores loudly, obese,  (-) tobacco use and recent URI   Neurologic:  - neg neurologic ROS     Cardiovascular:     (+) -----No previous cardiac testing  (-) taking anticoagulants/antiplatelets   METS/Exercise Tolerance: >4 METS    Hematologic:  - neg hematologic  ROS     Musculoskeletal:  - neg musculoskeletal ROS     GI/Hepatic:  - neg GI/hepatic ROS     Renal/Genitourinary:  - neg Renal ROS     Endo:     (+) Obesity,     Psychiatric/Substance Use:  - neg psychiatric ROS     Infectious Disease:  - neg infectious disease ROS     Malignancy:  - neg malignancy ROS     Other: Comment: Hidradenitis suppurativa s/p multiple debridements and on Remicade, will remain on per Dr. Ivy           Physical Exam    Airway        Mallampati: II   TM distance: > 3 FB   Neck ROM: limited   Mouth opening: > 3 cm    Respiratory Devices and Support         Dental    unable to assess        Cardiovascular          Rhythm and rate: regular and normal     Pulmonary           breath sounds clear to auscultation           OUTSIDE LABS:  CBC:   Lab Results   Component Value Date    WBC 12.2 (H) 2021    WBC 12.3 (H) 2020    HGB 14.9 2021    HGB 15.8 2020    HCT 45.6 2021    HCT 47.4 2020      07/09/2021     08/17/2020     BMP:   Lab Results   Component Value Date     07/09/2021     08/17/2020    POTASSIUM 3.7 07/09/2021    POTASSIUM 4.6 08/17/2020    CHLORIDE 105 07/09/2021    CHLORIDE 106 08/17/2020    CO2 26 07/09/2021    CO2 27 08/17/2020    BUN 10 07/09/2021    BUN 8 08/17/2020    CR 0.84 07/09/2021    CR 0.75 08/17/2020    GLC 84 07/09/2021    GLC 75 08/17/2020     COAGS: No results found for: PTT, INR, FIBR  POC: No results found for: BGM, HCG, HCGS  HEPATIC:   Lab Results   Component Value Date    ALBUMIN 3.5 07/09/2021    PROTTOTAL 8.1 07/09/2021    ALT 64 07/09/2021    AST 37 07/09/2021    ALKPHOS 91 07/09/2021    BILITOTAL 0.4 07/09/2021     OTHER:   Lab Results   Component Value Date    A1C 6.2 (H) 07/09/2021    KOURTNEY 9.3 07/09/2021    TSH 1.31 07/09/2021             PAC Discussion and Assessment    ASA Classification: 3  Case is suitable for: [place]  Anesthetic techniques and relevant risks discussed: GA  Invasive monitoring and risk discussed: No    Possibility and Risk of blood transfusion discussed: No            PAC Resident/NP Anesthesia Assessment: ASSESSMENT and PLAN  Margarito Sofia is a 36 year old male who presents for pre-operative H & P in preparation for Left axillary and posterior next wound excision and local flap closure, SPY with Dr. Ivy on 12/27/21 at [facility].   RCRI: 0.4% risk of serious cardiac event  VTE risk: 1.8%  NICKY: 4/8=Intermediate risk   PONV: 2    --Hidradenitis suppurativa s/p multiple debridements and medical therapy. Above procedures planned. Will take Bactrim on DOS. Patient is receiving Remicade every 4 weeks and has discussed this with Dr. Ivy. He was reportedly instructed to remain on but to have his last dose at least a week before surgery. His next dose is scheduled on 12/20/21.   --No personal anesthesia history. Reports that mother had some type of issues where her BP  dropped. Did mention that she may have metabolized the anesthesia slowly. No details available. We called his mother in [place] while he was in clinic and she also could not provide details, but said that she had some medical records that she would send him. I asked him to bring them for Anesthesia on DOS. If we are able to obtain ahead of time will review and scan into record.   --BMI 48. Truncal distribution. Able to lay flat. Limited neck extension.   --No cardiac history, symptoms or meds. Good activity tolerance.   --Nonsmoker. Denies pulmonary symptoms. Intermediate risk for NICKY. Reports snoring  --Last A1c 6.2  --Denies history of blood transfusion.       Patient was discussed with Dr Banks.       The patient is optimized and acceptable candidate for proposed procedure. Arrival time, NPO, shower and medication instructions provided by nursing staff today.    ADDENDUM: 12/21/21  Multiple calls to patient in this interim to see if any of his mother's records had been obtained. According to patient, mother has asked for her records but they have not been received. No further information available.          Reviewed and Signed by PAC Mid-Level Provider/Resident  Mid-Level Provider/Resident: TATY Putnam, CNS  Date: 12/8/21  Time: 9:17am                               TATY Otero

## 2021-12-08 NOTE — PROGRESS NOTES
PRESENTING COMPLAINT:  Preoperative visit for upcoming posterior neck and left axillary hidradenitis suppurativa excision and local flap closure scheduled for 12/27/2021.    HISTORY OF PRESENTING COMPLAINT:  Mr. Sofia is 36 years old, here for his preop visit.  No change in history and physical exam.  He has met with Dr. Denny of Dermatology and his perioperative immunotherapy and antibiotic treatment has been discussed and planned.  He is seen preop clearance as well.    ASSESSMENT AND PLAN:  Based on above findings, a diagnosis hidradenitis suppurativa of the left axilla and posterior neck.  Here for preoperative visit for upcoming excision and closure flap closure was made.  Went over the planned procedure with the patient in detail.  If primary closure is possible we will do that.  Otherwise, we will do flap closures.  He understood.  All risks, benefits, and alternatives of the procedures including pain, infection, bleeding, scarring, asymmetry, seromas, hematomas, wound breakdown, wound dehiscence 100% guaranteed, flap loss, requirement of further surgeries, staged reconstructions, injury to deeper structures, DVT, PE, MI, CVA, pneumonia, renal failure and death were all explained.  He understood them all and wants to proceed.  I look forward to helping him out in the near future.    All visit was chaperoned by my nurse, Magi Tolliver.    Total time spent with chart review, visit itself and post-visit paperwork was 20 minutes.

## 2021-12-08 NOTE — H&P (VIEW-ONLY)
Pre-Operative H & P     CC:  Preoperative exam to assess for increased cardiopulmonary risk while undergoing surgery and anesthesia.    Date of Encounter: 12/8/2021  Primary Care Physician:  No Ref-Primary, Physician     Reason for visit:   Encounter Diagnoses   Name Primary?     Hidradenitis suppurativa      Preop examination Yes       HPI  Margarito Sofia is a 36 year old male who presents for pre-operative H & P in preparation for Left axillary and posterior next wound excision and local flap closure, SPY with Dr. Ivy on 12/27/21 at [facility].     History is obtained from the patient and chart review    Patient who was recently referred to Dr. Ivy for hidradenitis suppurativa for the last few years.  He has been under the care of Dr. Denny and has been receiving immunomodulating agents as well as other standards of care, including multiple debridements. He continues to have some problems in the posterior neck and left axillary region despite adequate medical management and has been sent to me for further surgical management. He has been counseled for above procedures.     His history is otherwise significant for obesity     Hx of abnormal bleeding or anti-platelet use: Denies.     Prior to Admission Medications  Current Outpatient Medications   Medication Sig Dispense Refill     benzoyl peroxide (PANOXYL) 10 % external liquid Use daily as directed 148 mL 11     inFLIXimab (REMICADE) 100 MG injection 700 mg every 28 days        sulfamethoxazole-trimethoprim (BACTRIM DS) 800-160 MG tablet Take 1 tablet by mouth 2 times daily 180 tablet 1     vitamin D3 (CHOLECALCIFEROL) 1.25 MG (47299 UT) capsule Take 1 capsule (50,000 Units) by mouth every 7 days 12 capsule 3     Vitamin D3 (CHOLECALCIFEROL) 25 mcg (1000 units) tablet Take 2 tablets (50 mcg) by mouth daily 180 tablet 4     chlorhexidine (HIBICLENS) 4 % liquid Wash boil-prone areas three times weekly while  in shower       fluorometholone (FML LIQUIFILM) 0.1 % ophthalmic suspension SHAKE LQ AND INT 1 GTT IN OU D (Patient not taking: Reported on 12/8/2021)  5     glycopyrrolate (ROBINUL) 1 MG tablet Take 1 tablet (1 mg) by mouth 2 times daily (Patient not taking: Reported on 12/8/2021) 60 tablet 3       Family History  Family History   Problem Relation Age of Onset     Inflammatory Bowel Disease Mother      Skin Cancer No family hx of      Melanoma No family hx of        Past Medical History:   Diagnosis Date     Hidradenitis suppurativa      Morbid obesity (H)       History reviewed. No pertinent surgical history.   No Known Allergies   Social History     Tobacco Use     Smoking status: Never Smoker     Smokeless tobacco: Never Used   Substance Use Topics     Alcohol use: Yes     Comment: socially      Wt Readings from Last 1 Encounters:   12/08/21 (!) 160.9 kg (354 lb 11.2 oz)          ROS/MED HISTORY  The complete review of systems is negative other than noted in the HPI or here.    ENT/Pulmonary:     (+) NICKY risk factors, snores loudly, obese,  (-) tobacco use and recent URI   Neurologic:  - neg neurologic ROS     Cardiovascular:     (+) -----No previous cardiac testing  (-) taking anticoagulants/antiplatelets   METS/Exercise Tolerance: >4 METS    Hematologic:  - neg hematologic  ROS     Musculoskeletal:  - neg musculoskeletal ROS     GI/Hepatic:  - neg GI/hepatic ROS     Renal/Genitourinary:  - neg Renal ROS     Endo:     (+) Obesity,     Psychiatric/Substance Use:  - neg psychiatric ROS     Infectious Disease:  - neg infectious disease ROS     Malignancy:  - neg malignancy ROS     Other: Comment: Hidradenitis suppurativa s/p multiple debridements and on Remicade, will remain on per Dr. Ivy         Lab Results   Component Value Date    WBC 10.1 12/08/2021    WBC 12.2 07/09/2021     Lab Results   Component Value Date    RBC 5.30 12/08/2021    RBC 5.16 07/09/2021     Lab Results   Component Value Date    HGB 15.6  12/08/2021    HGB 14.9 07/09/2021     Lab Results   Component Value Date    HCT 46.8 12/08/2021    HCT 45.6 07/09/2021     Lab Results   Component Value Date    MCV 88 12/08/2021    MCV 88 07/09/2021     Lab Results   Component Value Date    MCH 29.4 12/08/2021    MCH 28.9 07/09/2021     Lab Results   Component Value Date    MCHC 33.3 12/08/2021    MCHC 32.7 07/09/2021     Lab Results   Component Value Date    RDW 13.4 12/08/2021    RDW 12.9 07/09/2021     Lab Results   Component Value Date     12/08/2021     07/09/2021     Last Comprehensive Metabolic Panel:  Sodium   Date Value Ref Range Status   12/08/2021 140 133 - 144 mmol/L Final   07/09/2021 136 133 - 144 mmol/L Final     Potassium   Date Value Ref Range Status   12/08/2021 4.0 3.4 - 5.3 mmol/L Final   07/09/2021 3.7 3.4 - 5.3 mmol/L Final     Chloride   Date Value Ref Range Status   12/08/2021 104 94 - 109 mmol/L Final   07/09/2021 105 94 - 109 mmol/L Final     Carbon Dioxide   Date Value Ref Range Status   07/09/2021 26 20 - 32 mmol/L Final     Carbon Dioxide (CO2)   Date Value Ref Range Status   12/08/2021 28 20 - 32 mmol/L Final     Anion Gap   Date Value Ref Range Status   12/08/2021 8 3 - 14 mmol/L Final   07/09/2021 5 3 - 14 mmol/L Final     Glucose   Date Value Ref Range Status   12/08/2021 174 (H) 70 - 99 mg/dL Final   07/09/2021 84 70 - 99 mg/dL Final     Urea Nitrogen   Date Value Ref Range Status   12/08/2021 12 7 - 30 mg/dL Final   07/09/2021 10 7 - 30 mg/dL Final     Creatinine   Date Value Ref Range Status   12/08/2021 0.93 0.66 - 1.25 mg/dL Final   07/09/2021 0.84 0.66 - 1.25 mg/dL Final     GFR Estimate   Date Value Ref Range Status   12/08/2021 >90 >60 mL/min/1.73m2 Final     Comment:     As of July 11, 2021, eGFR is calculated by the CKD-EPI creatinine equation, without race adjustment. eGFR can be influenced by muscle mass, exercise, and diet. The reported eGFR is an estimation only and is only applicable if the renal  function is stable.   07/09/2021 >90 >60 mL/min/[1.73_m2] Final     Comment:     Non  GFR Calc  Starting 12/18/2018, serum creatinine based estimated GFR (eGFR) will be   calculated using the Chronic Kidney Disease Epidemiology Collaboration   (CKD-EPI) equation.       Calcium   Date Value Ref Range Status   12/08/2021 8.9 8.5 - 10.1 mg/dL Final   07/09/2021 9.3 8.5 - 10.1 mg/dL Final     Lab Results   Component Value Date    AST 40 12/08/2021    AST 37 07/09/2021     Lab Results   Component Value Date    ALT 71 12/08/2021    ALT 64 07/09/2021     No results found for: BILICONJ   Lab Results   Component Value Date    BILITOTAL 0.5 12/08/2021    BILITOTAL 0.4 07/09/2021     Lab Results   Component Value Date    ALBUMIN 3.3 12/08/2021    ALBUMIN 3.5 07/09/2021     Lab Results   Component Value Date    PROTTOTAL 8.1 12/08/2021    PROTTOTAL 8.1 07/09/2021      Lab Results   Component Value Date    ALKPHOS 87 12/08/2021    ALKPHOS 91 07/09/2021       OTHER:   Lab Results   Component Value Date    A1C 6.2 (H) 07/09/2021    KOURTNEY 9.3 07/09/2021    TSH 1.31 07/09/2021       BP (!) 137/96 (BP Location: Right arm, Patient Position: Sitting, Cuff Size: Adult Large)   Pulse 88   Temp 97.9  F (36.6  C) (Oral)   Resp 20   Ht 1.829 m (6')   Wt (!) 160.9 kg (354 lb 11.2 oz)   SpO2 96%   BMI 48.11 kg/m         Physical Exam  Constitutional: Awake, alert, cooperative, no apparent distress, and appears stated age.  Eyes: Pupils equal, round and reactive to light, extra ocular muscles intact, sclera clear, conjunctiva normal.   HENT: Normocephalic, oral pharynx with moist mucus membranes, good dentition. No goiter appreciated.   Respiratory: Clear to auscultation bilaterally, no crackles or wheezing. No cough or obvious dyspnea.  Cardiovascular: Regular rate and rhythm, normal S1 and S2, and no murmur noted.  Carotids +2, no bruits. No edema. Palpable pulses to radial  DP and PT arteries.   GI: Normal bowel sounds,  soft, non-distended, non-tender, no masses palpated. Difficult exam due to obese abdomen.  Lymph/Hematologic: No cervical lymphadenopathy and no supraclavicular lymphadenopathy.  Genitourinary: Deferred.   Skin: Warm and dry.   Musculoskeletal: Limited ROM of neck. There is no redness, warmth, or swelling of the joints. Gross motor strength is normal.  Neurologic: Awake, alert, oriented to name, place and time. Cranial nerves II-XII are grossly intact. Gait is normal.   Neuropsychiatric: Calm, cooperative. Normal affect.     PRIOR LABS/DIAGNOSTIC STUDIES:   All labs and imaging personally reviewed   EKG: Not indicated  The patient's records and results personally reviewed by this provider.     Outside records reviewed from: care everywhere    ASSESSMENT and PLAN  Margarito Sofia is a 36 year old male who presents for pre-operative H & P in preparation for Left axillary and posterior next wound excision and local flap closure, SPY with Dr. Ivy on 12/27/21 at [facility].   RCRI: 0.4% risk of serious cardiac event  VTE risk: 1.8%  NICKY: 4/8=Intermediate risk   PONV: 2    --Hidradenitis suppurativa s/p multiple debridements and medical therapy. Above procedures planned. Will take Bactrim on DOS. Patient is receiving Remicade every 4 weeks and has discussed this with Dr. Ivy. He was reportedly instructed to remain on but to have his last dose at least a week before surgery. His next dose is scheduled on 12/20/21.   --No personal anesthesia history. Reports that mother had some type of issues where her BP dropped. Did mention that she may have metabolized the anesthesia slowly. No details available. We called his mother in [place] while he was in clinic and she also could not provide details, but said that she had some medical records that she would send him. I asked him to bring them for Anesthesia on DOS. If we are able to obtain ahead of time will review and scan into  record.   --BMI 48. Truncal distribution. Able to lay flat. Limited neck extension.   --No cardiac history, symptoms or meds. Good activity tolerance.   --Nonsmoker. Denies pulmonary symptoms. Intermediate risk for NICKY. Reports snoring  --Last A1c 6.2  --Denies history of blood transfusion.       Patient was discussed with Dr Banks.       The patient is optimized and acceptable candidate for proposed procedure. Arrival time, NPO, shower and medication instructions provided by nursing staff today.    ADDENDUM: 12/21/21  Multiple calls to patient in this interim to see if any of his mother's records had been obtained. According to patient, mother has asked for her records but they have not been received. No further information available.      On the day of service:     Prep time: 0 minutes  Visit time: 15 minutes  Documentation time: 32 minutes  ------------------------------------------  Total time: 47 minutes      TATY Otero CNS  Preoperative Assessment Center  [facility]  Clinic and Surgery Center  Phone: [phone]  Fax: [phone]

## 2021-12-08 NOTE — H&P
Pre-Operative H & P     CC:  Preoperative exam to assess for increased cardiopulmonary risk while undergoing surgery and anesthesia.    Date of Encounter: 12/8/2021  Primary Care Physician:  No Ref-Primary, Physician     Reason for visit:   Encounter Diagnoses   Name Primary?     Hidradenitis suppurativa      Preop examination Yes       HPI  Margarito Sofia is a 36 year old male who presents for pre-operative H & P in preparation for Left axillary and posterior next wound excision and local flap closure, SPY with Dr. Ivy on 12/27/21 at [facility].     History is obtained from the patient and chart review    Patient who was recently referred to Dr. Ivy for hidradenitis suppurativa for the last few years.  He has been under the care of Dr. Denny and has been receiving immunomodulating agents as well as other standards of care, including multiple debridements. He continues to have some problems in the posterior neck and left axillary region despite adequate medical management and has been sent to me for further surgical management. He has been counseled for above procedures.     His history is otherwise significant for obesity     Hx of abnormal bleeding or anti-platelet use: Denies.     Prior to Admission Medications  Current Outpatient Medications   Medication Sig Dispense Refill     benzoyl peroxide (PANOXYL) 10 % external liquid Use daily as directed 148 mL 11     inFLIXimab (REMICADE) 100 MG injection 700 mg every 28 days        sulfamethoxazole-trimethoprim (BACTRIM DS) 800-160 MG tablet Take 1 tablet by mouth 2 times daily 180 tablet 1     vitamin D3 (CHOLECALCIFEROL) 1.25 MG (96967 UT) capsule Take 1 capsule (50,000 Units) by mouth every 7 days 12 capsule 3     Vitamin D3 (CHOLECALCIFEROL) 25 mcg (1000 units) tablet Take 2 tablets (50 mcg) by mouth daily 180 tablet 4     chlorhexidine (HIBICLENS) 4 % liquid Wash boil-prone areas three times weekly while  in shower       fluorometholone (FML LIQUIFILM) 0.1 % ophthalmic suspension SHAKE LQ AND INT 1 GTT IN OU D (Patient not taking: Reported on 12/8/2021)  5     glycopyrrolate (ROBINUL) 1 MG tablet Take 1 tablet (1 mg) by mouth 2 times daily (Patient not taking: Reported on 12/8/2021) 60 tablet 3       Family History  Family History   Problem Relation Age of Onset     Inflammatory Bowel Disease Mother      Skin Cancer No family hx of      Melanoma No family hx of        Past Medical History:   Diagnosis Date     Hidradenitis suppurativa      Morbid obesity (H)       History reviewed. No pertinent surgical history.   No Known Allergies   Social History     Tobacco Use     Smoking status: Never Smoker     Smokeless tobacco: Never Used   Substance Use Topics     Alcohol use: Yes     Comment: socially      Wt Readings from Last 1 Encounters:   12/08/21 (!) 160.9 kg (354 lb 11.2 oz)          ROS/MED HISTORY  The complete review of systems is negative other than noted in the HPI or here.    ENT/Pulmonary:     (+) NICKY risk factors, snores loudly, obese,  (-) tobacco use and recent URI   Neurologic:  - neg neurologic ROS     Cardiovascular:     (+) -----No previous cardiac testing  (-) taking anticoagulants/antiplatelets   METS/Exercise Tolerance: >4 METS    Hematologic:  - neg hematologic  ROS     Musculoskeletal:  - neg musculoskeletal ROS     GI/Hepatic:  - neg GI/hepatic ROS     Renal/Genitourinary:  - neg Renal ROS     Endo:     (+) Obesity,     Psychiatric/Substance Use:  - neg psychiatric ROS     Infectious Disease:  - neg infectious disease ROS     Malignancy:  - neg malignancy ROS     Other: Comment: Hidradenitis suppurativa s/p multiple debridements and on Remicade, will remain on per Dr. Ivy         Lab Results   Component Value Date    WBC 10.1 12/08/2021    WBC 12.2 07/09/2021     Lab Results   Component Value Date    RBC 5.30 12/08/2021    RBC 5.16 07/09/2021     Lab Results   Component Value Date    HGB 15.6  12/08/2021    HGB 14.9 07/09/2021     Lab Results   Component Value Date    HCT 46.8 12/08/2021    HCT 45.6 07/09/2021     Lab Results   Component Value Date    MCV 88 12/08/2021    MCV 88 07/09/2021     Lab Results   Component Value Date    MCH 29.4 12/08/2021    MCH 28.9 07/09/2021     Lab Results   Component Value Date    MCHC 33.3 12/08/2021    MCHC 32.7 07/09/2021     Lab Results   Component Value Date    RDW 13.4 12/08/2021    RDW 12.9 07/09/2021     Lab Results   Component Value Date     12/08/2021     07/09/2021     Last Comprehensive Metabolic Panel:  Sodium   Date Value Ref Range Status   12/08/2021 140 133 - 144 mmol/L Final   07/09/2021 136 133 - 144 mmol/L Final     Potassium   Date Value Ref Range Status   12/08/2021 4.0 3.4 - 5.3 mmol/L Final   07/09/2021 3.7 3.4 - 5.3 mmol/L Final     Chloride   Date Value Ref Range Status   12/08/2021 104 94 - 109 mmol/L Final   07/09/2021 105 94 - 109 mmol/L Final     Carbon Dioxide   Date Value Ref Range Status   07/09/2021 26 20 - 32 mmol/L Final     Carbon Dioxide (CO2)   Date Value Ref Range Status   12/08/2021 28 20 - 32 mmol/L Final     Anion Gap   Date Value Ref Range Status   12/08/2021 8 3 - 14 mmol/L Final   07/09/2021 5 3 - 14 mmol/L Final     Glucose   Date Value Ref Range Status   12/08/2021 174 (H) 70 - 99 mg/dL Final   07/09/2021 84 70 - 99 mg/dL Final     Urea Nitrogen   Date Value Ref Range Status   12/08/2021 12 7 - 30 mg/dL Final   07/09/2021 10 7 - 30 mg/dL Final     Creatinine   Date Value Ref Range Status   12/08/2021 0.93 0.66 - 1.25 mg/dL Final   07/09/2021 0.84 0.66 - 1.25 mg/dL Final     GFR Estimate   Date Value Ref Range Status   12/08/2021 >90 >60 mL/min/1.73m2 Final     Comment:     As of July 11, 2021, eGFR is calculated by the CKD-EPI creatinine equation, without race adjustment. eGFR can be influenced by muscle mass, exercise, and diet. The reported eGFR is an estimation only and is only applicable if the renal  function is stable.   07/09/2021 >90 >60 mL/min/[1.73_m2] Final     Comment:     Non  GFR Calc  Starting 12/18/2018, serum creatinine based estimated GFR (eGFR) will be   calculated using the Chronic Kidney Disease Epidemiology Collaboration   (CKD-EPI) equation.       Calcium   Date Value Ref Range Status   12/08/2021 8.9 8.5 - 10.1 mg/dL Final   07/09/2021 9.3 8.5 - 10.1 mg/dL Final     Lab Results   Component Value Date    AST 40 12/08/2021    AST 37 07/09/2021     Lab Results   Component Value Date    ALT 71 12/08/2021    ALT 64 07/09/2021     No results found for: BILICONJ   Lab Results   Component Value Date    BILITOTAL 0.5 12/08/2021    BILITOTAL 0.4 07/09/2021     Lab Results   Component Value Date    ALBUMIN 3.3 12/08/2021    ALBUMIN 3.5 07/09/2021     Lab Results   Component Value Date    PROTTOTAL 8.1 12/08/2021    PROTTOTAL 8.1 07/09/2021      Lab Results   Component Value Date    ALKPHOS 87 12/08/2021    ALKPHOS 91 07/09/2021       OTHER:   Lab Results   Component Value Date    A1C 6.2 (H) 07/09/2021    KOURTNEY 9.3 07/09/2021    TSH 1.31 07/09/2021       BP (!) 137/96 (BP Location: Right arm, Patient Position: Sitting, Cuff Size: Adult Large)   Pulse 88   Temp 97.9  F (36.6  C) (Oral)   Resp 20   Ht 1.829 m (6')   Wt (!) 160.9 kg (354 lb 11.2 oz)   SpO2 96%   BMI 48.11 kg/m         Physical Exam  Constitutional: Awake, alert, cooperative, no apparent distress, and appears stated age.  Eyes: Pupils equal, round and reactive to light, extra ocular muscles intact, sclera clear, conjunctiva normal.   HENT: Normocephalic, oral pharynx with moist mucus membranes, good dentition. No goiter appreciated.   Respiratory: Clear to auscultation bilaterally, no crackles or wheezing. No cough or obvious dyspnea.  Cardiovascular: Regular rate and rhythm, normal S1 and S2, and no murmur noted.  Carotids +2, no bruits. No edema. Palpable pulses to radial  DP and PT arteries.   GI: Normal bowel sounds,  soft, non-distended, non-tender, no masses palpated. Difficult exam due to obese abdomen.  Lymph/Hematologic: No cervical lymphadenopathy and no supraclavicular lymphadenopathy.  Genitourinary: Deferred.   Skin: Warm and dry.   Musculoskeletal: Limited ROM of neck. There is no redness, warmth, or swelling of the joints. Gross motor strength is normal.  Neurologic: Awake, alert, oriented to name, place and time. Cranial nerves II-XII are grossly intact. Gait is normal.   Neuropsychiatric: Calm, cooperative. Normal affect.     PRIOR LABS/DIAGNOSTIC STUDIES:   All labs and imaging personally reviewed   EKG: Not indicated  The patient's records and results personally reviewed by this provider.     Outside records reviewed from: care everywhere    ASSESSMENT and PLAN  Margarito Sofia is a 36 year old male who presents for pre-operative H & P in preparation for Left axillary and posterior next wound excision and local flap closure, SPY with Dr. Ivy on 12/27/21 at [facility].   RCRI: 0.4% risk of serious cardiac event  VTE risk: 1.8%  NICKY: 4/8=Intermediate risk   PONV: 2    --Hidradenitis suppurativa s/p multiple debridements and medical therapy. Above procedures planned. Will take Bactrim on DOS. Patient is receiving Remicade every 4 weeks and has discussed this with Dr. Ivy. He was reportedly instructed to remain on but to have his last dose at least a week before surgery. His next dose is scheduled on 12/20/21.   --No personal anesthesia history. Reports that mother had some type of issues where her BP dropped. Did mention that she may have metabolized the anesthesia slowly. No details available. We called his mother in [place] while he was in clinic and she also could not provide details, but said that she had some medical records that she would send him. I asked him to bring them for Anesthesia on DOS. If we are able to obtain ahead of time will review and scan into  record.   --BMI 48. Truncal distribution. Able to lay flat. Limited neck extension.   --No cardiac history, symptoms or meds. Good activity tolerance.   --Nonsmoker. Denies pulmonary symptoms. Intermediate risk for NICKY. Reports snoring  --Last A1c 6.2  --Denies history of blood transfusion.       Patient was discussed with Dr Banks.       The patient is optimized and acceptable candidate for proposed procedure. Arrival time, NPO, shower and medication instructions provided by nursing staff today.    ADDENDUM: 12/21/21  Multiple calls to patient in this interim to see if any of his mother's records had been obtained. According to patient, mother has asked for her records but they have not been received. No further information available.      On the day of service:     Prep time: 0 minutes  Visit time: 15 minutes  Documentation time: 32 minutes  ------------------------------------------  Total time: 47 minutes      TATY Otero CNS  Preoperative Assessment Center  [facility]  Cl[facility] and [facility]  Phone: [phone]  Fax: [phone]

## 2021-12-08 NOTE — NURSING NOTE
Chief Complaint   Patient presents with     Consult     pre-op consult -- DOS 12/27       Vitals:    12/08/21 1006   BP: (!) 140/89   Pulse: 99   SpO2: 96%   Weight: (!) 159.6 kg (351 lb 14.4 oz)   Height: 1.829 m (6')       Body mass index is 47.73 kg/m .          NOMAN WHITE EMT

## 2021-12-14 ENCOUNTER — TELEPHONE (OUTPATIENT)
Dept: SURGERY | Facility: CLINIC | Age: 36
End: 2021-12-14
Payer: COMMERCIAL

## 2021-12-14 NOTE — TELEPHONE ENCOUNTER
Spoke with Jarrod to follow up on family medical records regarding history of anesthesia issue.  Jarrod stated his mom left a message with her doctor to request information about any anesthesia issues she had in the past.  Will follow up with Jarrod by end of the week to discuss.  Keri Alejandre RN

## 2021-12-16 ENCOUNTER — HOME INFUSION (PRE-WILLOW HOME INFUSION) (OUTPATIENT)
Dept: PHARMACY | Facility: CLINIC | Age: 36
End: 2021-12-16
Payer: COMMERCIAL

## 2021-12-17 ENCOUNTER — TELEPHONE (OUTPATIENT)
Dept: SURGERY | Facility: CLINIC | Age: 36
End: 2021-12-17
Payer: COMMERCIAL

## 2021-12-20 ENCOUNTER — HOME INFUSION (PRE-WILLOW HOME INFUSION) (OUTPATIENT)
Dept: PHARMACY | Facility: CLINIC | Age: 36
End: 2021-12-20
Payer: COMMERCIAL

## 2021-12-20 ENCOUNTER — TELEPHONE (OUTPATIENT)
Dept: SURGERY | Facility: CLINIC | Age: 36
End: 2021-12-20
Payer: COMMERCIAL

## 2021-12-20 ENCOUNTER — DOCUMENTATION ONLY (OUTPATIENT)
Dept: PHARMACY | Facility: CLINIC | Age: 36
End: 2021-12-20
Payer: COMMERCIAL

## 2021-12-20 NOTE — TELEPHONE ENCOUNTER
"Spoke with doug to follow up on obtaining his mother's medical records showing her history of anesthesia issues.  Doug said his mom \"is still working on it\" and once he has the records he will \"let you guys know\".  Keri Alejandre RN  "

## 2021-12-24 ENCOUNTER — LAB (OUTPATIENT)
Dept: URGENT CARE | Facility: URGENT CARE | Age: 36
End: 2021-12-24
Payer: COMMERCIAL

## 2021-12-24 DIAGNOSIS — Z11.59 ENCOUNTER FOR SCREENING FOR OTHER VIRAL DISEASES: ICD-10-CM

## 2021-12-24 LAB — SARS-COV-2 RNA RESP QL NAA+PROBE: NEGATIVE

## 2021-12-24 PROCEDURE — U0003 INFECTIOUS AGENT DETECTION BY NUCLEIC ACID (DNA OR RNA); SEVERE ACUTE RESPIRATORY SYNDROME CORONAVIRUS 2 (SARS-COV-2) (CORONAVIRUS DISEASE [COVID-19]), AMPLIFIED PROBE TECHNIQUE, MAKING USE OF HIGH THROUGHPUT TECHNOLOGIES AS DESCRIBED BY CMS-2020-01-R: HCPCS

## 2021-12-24 PROCEDURE — U0005 INFEC AGEN DETEC AMPLI PROBE: HCPCS

## 2021-12-26 ENCOUNTER — ANESTHESIA EVENT (OUTPATIENT)
Dept: SURGERY | Facility: CLINIC | Age: 36
DRG: 571 | End: 2021-12-26
Payer: COMMERCIAL

## 2021-12-26 ASSESSMENT — LIFESTYLE VARIABLES: TOBACCO_USE: 0

## 2021-12-26 NOTE — ANESTHESIA PREPROCEDURE EVALUATION
Anesthesia Pre-Procedure Evaluation    Patient: Margarito Sofia   MRN: [id number] : 1985        Preoperative Diagnosis: Hidradenitis suppurativa [L73.2]    Procedure : Procedure(s):  Left axillary and posterior next wound excision and local flap closure, SPY          Past Medical History:   Diagnosis Date     Hidradenitis suppurativa      Morbid obesity (H)       No past surgical history on file.   No Known Allergies   Social History     Tobacco Use     Smoking status: Never Smoker     Smokeless tobacco: Never Used   Substance Use Topics     Alcohol use: Yes     Comment: socially      Wt Readings from Last 1 Encounters:   21 (!) 159.6 kg (351 lb 14.4 oz)        Anesthesia Evaluation            ROS/MED HX  ENT/Pulmonary:     (+) NICKY risk factors, snores loudly, obese,  (-) tobacco use and recent URI   Neurologic:  - neg neurologic ROS     Cardiovascular:     (+) -----No previous cardiac testing  (-) taking anticoagulants/antiplatelets   METS/Exercise Tolerance: >4 METS    Hematologic:  - neg hematologic  ROS     Musculoskeletal:  - neg musculoskeletal ROS     GI/Hepatic: Comment: - minimal ALT elevation   - neg GI/hepatic ROS     Renal/Genitourinary:  - neg Renal ROS     Endo: Comment: - Pre-diabetes, A1c 6.2 (2021)    (+) Obesity (BMI ~ 48),     Psychiatric/Substance Use:  - neg psychiatric ROS     Infectious Disease:  - neg infectious disease ROS     Malignancy:  - neg malignancy ROS     Other: Comment: Hidradenitis suppurativa s/p multiple debridements and on Remicade, will remain on per Dr. Ivy  - Family history of intraoperative hypotension and possible slow drug metabolizer in mother. Planning to bring mother's records on DOS.           Physical Exam    Airway        Mallampati: III   TM distance: > 3 FB   Neck ROM: full   Mouth opening: > 3 cm    Respiratory Devices and Support         Dental  no notable dental history         Cardiovascular   cardiovascular exam normal           Pulmonary   pulmonary exam normal                OUTSIDE LABS:  CBC:   Lab Results   Component Value Date    WBC 10.1 12/08/2021    WBC 12.2 (H) 07/09/2021    HGB 15.6 12/08/2021    HGB 14.9 07/09/2021    HCT 46.8 12/08/2021    HCT 45.6 07/09/2021     12/08/2021     07/09/2021     BMP:   Lab Results   Component Value Date     12/08/2021     07/09/2021    POTASSIUM 4.0 12/08/2021    POTASSIUM 3.7 07/09/2021    CHLORIDE 104 12/08/2021    CHLORIDE 105 07/09/2021    CO2 28 12/08/2021    CO2 26 07/09/2021    BUN 12 12/08/2021    BUN 10 07/09/2021    CR 0.93 12/08/2021    CR 0.84 07/09/2021     (H) 12/08/2021    GLC 84 07/09/2021     COAGS: No results found for: PTT, INR, FIBR  POC: No results found for: BGM, HCG, HCGS  HEPATIC:   Lab Results   Component Value Date    ALBUMIN 3.3 (L) 12/08/2021    PROTTOTAL 8.1 12/08/2021    ALT 71 (H) 12/08/2021    AST 40 12/08/2021    ALKPHOS 87 12/08/2021    BILITOTAL 0.5 12/08/2021     OTHER:   Lab Results   Component Value Date    A1C 6.2 (H) 07/09/2021    KOURTNEY 8.9 12/08/2021    TSH 1.31 07/09/2021       Anesthesia Plan    ASA Status:  2   NPO Status:  NPO Appropriate    Anesthesia Type: General.     - Airway: ETT   Induction: Intravenous.   Maintenance: Inhalation.   Techniques and Equipment:     - Lines/Monitors: BIS     Consents    Anesthesia Plan(s) and associated risks, benefits, and realistic alternatives discussed. Questions answered and patient/representative(s) expressed understanding.     - Discussed: Risks, Benefits and Alternatives for BOTH SEDATION and the PROCEDURE were discussed     - Discussed with:  Patient      - Extended Intubation/Ventilatory Support Discussed: No.      - Patient is DNR/DNI Status: No    Use of blood products discussed: No .     Postoperative Care    Pain management: Oral pain medications.   PONV prophylaxis: Ondansetron (or other 5HT-3), Dexamethasone or Solumedrol     Comments:                Jagjit Montenegro,  MD

## 2021-12-27 ENCOUNTER — HOSPITAL ENCOUNTER (INPATIENT)
Facility: CLINIC | Age: 36
LOS: 1 days | Discharge: HOME | DRG: 571 | End: 2021-12-28
Attending: PLASTIC SURGERY | Admitting: PLASTIC SURGERY
Payer: COMMERCIAL

## 2021-12-27 ENCOUNTER — ANESTHESIA (OUTPATIENT)
Dept: SURGERY | Facility: CLINIC | Age: 36
DRG: 571 | End: 2021-12-27
Payer: COMMERCIAL

## 2021-12-27 DIAGNOSIS — Z98.890 S/P FLAP GRAFT: Primary | ICD-10-CM

## 2021-12-27 LAB
GLUCOSE BLDC GLUCOMTR-MCNC: 127 MG/DL (ref 70–99)
GLUCOSE BLDC GLUCOMTR-MCNC: 175 MG/DL (ref 70–99)

## 2021-12-27 PROCEDURE — 88305 TISSUE EXAM BY PATHOLOGIST: CPT | Mod: 26 | Performed by: PATHOLOGY

## 2021-12-27 PROCEDURE — 250N000009 HC RX 250: Performed by: PLASTIC SURGERY

## 2021-12-27 PROCEDURE — 999N000141 HC STATISTIC PRE-PROCEDURE NURSING ASSESSMENT: Performed by: PLASTIC SURGERY

## 2021-12-27 PROCEDURE — 250N000011 HC RX IP 250 OP 636: Performed by: PLASTIC SURGERY

## 2021-12-27 PROCEDURE — 12045 INTMD RPR N-HF/GENIT12.6-20: CPT | Mod: 59 | Performed by: PLASTIC SURGERY

## 2021-12-27 PROCEDURE — 0JB40ZZ EXCISION OF RIGHT NECK SUBCUTANEOUS TISSUE AND FASCIA, OPEN APPROACH: ICD-10-PCS | Performed by: PLASTIC SURGERY

## 2021-12-27 PROCEDURE — 15734 MUSCLE-SKIN GRAFT TRUNK: CPT | Mod: GC | Performed by: PLASTIC SURGERY

## 2021-12-27 PROCEDURE — 258N000001 HC RX 258: Performed by: PLASTIC SURGERY

## 2021-12-27 PROCEDURE — 0KXG0Z0 TRANSFER LEFT TRUNK MUSCLE WITH SKIN, OPEN APPROACH: ICD-10-PCS | Performed by: PLASTIC SURGERY

## 2021-12-27 PROCEDURE — 250N000011 HC RX IP 250 OP 636: Performed by: STUDENT IN AN ORGANIZED HEALTH CARE EDUCATION/TRAINING PROGRAM

## 2021-12-27 PROCEDURE — 0JB50ZZ EXCISION OF LEFT NECK SUBCUTANEOUS TISSUE AND FASCIA, OPEN APPROACH: ICD-10-PCS | Performed by: PLASTIC SURGERY

## 2021-12-27 PROCEDURE — 11451 EXC SKN HDRDNT AX COMPLEX: CPT | Mod: LT | Performed by: PLASTIC SURGERY

## 2021-12-27 PROCEDURE — 0JBF0ZZ EXCISION OF LEFT UPPER ARM SUBCUTANEOUS TISSUE AND FASCIA, OPEN APPROACH: ICD-10-PCS | Performed by: PLASTIC SURGERY

## 2021-12-27 PROCEDURE — 15101 SPLT AGRFT T/A/L EA ADDL 100: CPT | Mod: GC | Performed by: PLASTIC SURGERY

## 2021-12-27 PROCEDURE — 250N000009 HC RX 250: Performed by: ANESTHESIOLOGY

## 2021-12-27 PROCEDURE — 360N000077 HC SURGERY LEVEL 4, PER MIN: Performed by: PLASTIC SURGERY

## 2021-12-27 PROCEDURE — 120N000002 HC R&B MED SURG/OB UMMC

## 2021-12-27 PROCEDURE — 258N000003 HC RX IP 258 OP 636: Performed by: STUDENT IN AN ORGANIZED HEALTH CARE EDUCATION/TRAINING PROGRAM

## 2021-12-27 PROCEDURE — 15100 SPLT AGRFT T/A/L 1ST 100SQCM: CPT | Mod: GC | Performed by: PLASTIC SURGERY

## 2021-12-27 PROCEDURE — 710N000010 HC RECOVERY PHASE 1, LEVEL 2, PER MIN: Performed by: PLASTIC SURGERY

## 2021-12-27 PROCEDURE — 250N000013 HC RX MED GY IP 250 OP 250 PS 637

## 2021-12-27 PROCEDURE — 250N000009 HC RX 250: Performed by: STUDENT IN AN ORGANIZED HEALTH CARE EDUCATION/TRAINING PROGRAM

## 2021-12-27 PROCEDURE — 37799 UNLISTED PX VASCULAR SURGERY: CPT | Mod: GC | Performed by: PLASTIC SURGERY

## 2021-12-27 PROCEDURE — 272N000001 HC OR GENERAL SUPPLY STERILE: Performed by: PLASTIC SURGERY

## 2021-12-27 PROCEDURE — 250N000025 HC SEVOFLURANE, PER MIN: Performed by: PLASTIC SURGERY

## 2021-12-27 PROCEDURE — 88305 TISSUE EXAM BY PATHOLOGIST: CPT | Mod: TC | Performed by: PLASTIC SURGERY

## 2021-12-27 PROCEDURE — 250N000013 HC RX MED GY IP 250 OP 250 PS 637: Performed by: STUDENT IN AN ORGANIZED HEALTH CARE EDUCATION/TRAINING PROGRAM

## 2021-12-27 PROCEDURE — 11426 EXC H-F-NK-SP B9+MARG >4 CM: CPT | Mod: GC | Performed by: PLASTIC SURGERY

## 2021-12-27 PROCEDURE — 250N000011 HC RX IP 250 OP 636

## 2021-12-27 PROCEDURE — 370N000017 HC ANESTHESIA TECHNICAL FEE, PER MIN: Performed by: PLASTIC SURGERY

## 2021-12-27 RX ORDER — MINERAL OIL
OIL (ML) MISCELLANEOUS PRN
Status: DISCONTINUED | OUTPATIENT
Start: 2021-12-27 | End: 2021-12-27 | Stop reason: HOSPADM

## 2021-12-27 RX ORDER — HYDROMORPHONE HCL IN WATER/PF 6 MG/30 ML
0.2 PATIENT CONTROLLED ANALGESIA SYRINGE INTRAVENOUS
Status: DISCONTINUED | OUTPATIENT
Start: 2021-12-27 | End: 2021-12-28

## 2021-12-27 RX ORDER — HYDRALAZINE HYDROCHLORIDE 20 MG/ML
2.5-5 INJECTION INTRAMUSCULAR; INTRAVENOUS EVERY 10 MIN PRN
Status: DISCONTINUED | OUTPATIENT
Start: 2021-12-27 | End: 2021-12-27 | Stop reason: HOSPADM

## 2021-12-27 RX ORDER — OXYCODONE HYDROCHLORIDE 5 MG/1
5 TABLET ORAL EVERY 4 HOURS PRN
Status: DISCONTINUED | OUTPATIENT
Start: 2021-12-27 | End: 2021-12-27 | Stop reason: HOSPADM

## 2021-12-27 RX ORDER — ACETAMINOPHEN 325 MG/1
975 TABLET ORAL ONCE
Status: COMPLETED | OUTPATIENT
Start: 2021-12-27 | End: 2021-12-27

## 2021-12-27 RX ORDER — ONDANSETRON 2 MG/ML
INJECTION INTRAMUSCULAR; INTRAVENOUS PRN
Status: DISCONTINUED | OUTPATIENT
Start: 2021-12-27 | End: 2021-12-27

## 2021-12-27 RX ORDER — ACETAMINOPHEN 325 MG/1
650 TABLET ORAL EVERY 4 HOURS PRN
Status: DISCONTINUED | OUTPATIENT
Start: 2021-12-30 | End: 2021-12-28 | Stop reason: HOSPADM

## 2021-12-27 RX ORDER — FENTANYL CITRATE 50 UG/ML
INJECTION, SOLUTION INTRAMUSCULAR; INTRAVENOUS PRN
Status: DISCONTINUED | OUTPATIENT
Start: 2021-12-27 | End: 2021-12-27

## 2021-12-27 RX ORDER — METOPROLOL TARTRATE 1 MG/ML
1-2 INJECTION, SOLUTION INTRAVENOUS EVERY 5 MIN PRN
Status: DISCONTINUED | OUTPATIENT
Start: 2021-12-27 | End: 2021-12-27 | Stop reason: HOSPADM

## 2021-12-27 RX ORDER — FENTANYL CITRATE 50 UG/ML
25 INJECTION, SOLUTION INTRAMUSCULAR; INTRAVENOUS EVERY 5 MIN PRN
Status: DISCONTINUED | OUTPATIENT
Start: 2021-12-27 | End: 2021-12-27 | Stop reason: HOSPADM

## 2021-12-27 RX ORDER — CEFAZOLIN SODIUM IN 0.9 % NACL 3 G/100 ML
3 INTRAVENOUS SOLUTION, PIGGYBACK (ML) INTRAVENOUS
Status: COMPLETED | OUTPATIENT
Start: 2021-12-27 | End: 2021-12-27

## 2021-12-27 RX ORDER — SODIUM CHLORIDE, SODIUM LACTATE, POTASSIUM CHLORIDE, CALCIUM CHLORIDE 600; 310; 30; 20 MG/100ML; MG/100ML; MG/100ML; MG/100ML
INJECTION, SOLUTION INTRAVENOUS CONTINUOUS
Status: DISCONTINUED | OUTPATIENT
Start: 2021-12-27 | End: 2021-12-27 | Stop reason: HOSPADM

## 2021-12-27 RX ORDER — DEXMEDETOMIDINE HYDROCHLORIDE 4 UG/ML
.1-1.2 INJECTION, SOLUTION INTRAVENOUS CONTINUOUS
Status: DISCONTINUED | OUTPATIENT
Start: 2021-12-27 | End: 2021-12-27 | Stop reason: HOSPADM

## 2021-12-27 RX ORDER — ONDANSETRON 4 MG/1
4 TABLET, ORALLY DISINTEGRATING ORAL EVERY 6 HOURS PRN
Qty: 10 TABLET | Refills: 0 | Status: SHIPPED | OUTPATIENT
Start: 2021-12-27 | End: 2022-10-31

## 2021-12-27 RX ORDER — NALOXONE HYDROCHLORIDE 0.4 MG/ML
0.4 INJECTION, SOLUTION INTRAMUSCULAR; INTRAVENOUS; SUBCUTANEOUS
Status: DISCONTINUED | OUTPATIENT
Start: 2021-12-27 | End: 2021-12-28 | Stop reason: HOSPADM

## 2021-12-27 RX ORDER — LIDOCAINE HYDROCHLORIDE 20 MG/ML
INJECTION, SOLUTION INFILTRATION; PERINEURAL PRN
Status: DISCONTINUED | OUTPATIENT
Start: 2021-12-27 | End: 2021-12-27

## 2021-12-27 RX ORDER — POLYETHYLENE GLYCOL 3350 17 G/17G
17 POWDER, FOR SOLUTION ORAL DAILY
Qty: 510 G | Refills: 0 | Status: SHIPPED | OUTPATIENT
Start: 2021-12-27 | End: 2022-10-31

## 2021-12-27 RX ORDER — METHOCARBAMOL 750 MG/1
375 TABLET, FILM COATED ORAL EVERY 6 HOURS PRN
Qty: 20 TABLET | Refills: 0 | Status: SHIPPED | OUTPATIENT
Start: 2021-12-27 | End: 2021-12-28

## 2021-12-27 RX ORDER — OXYCODONE HYDROCHLORIDE 5 MG/1
5 TABLET ORAL EVERY 6 HOURS PRN
Qty: 15 TABLET | Refills: 0 | Status: SHIPPED | OUTPATIENT
Start: 2021-12-27 | End: 2021-12-31

## 2021-12-27 RX ORDER — ONDANSETRON 4 MG/1
4 TABLET, ORALLY DISINTEGRATING ORAL EVERY 30 MIN PRN
Status: DISCONTINUED | OUTPATIENT
Start: 2021-12-27 | End: 2021-12-27 | Stop reason: HOSPADM

## 2021-12-27 RX ORDER — INDOCYANINE GREEN AND WATER 25 MG
KIT INJECTION PRN
Status: DISCONTINUED | OUTPATIENT
Start: 2021-12-27 | End: 2021-12-27

## 2021-12-27 RX ORDER — LIDOCAINE 40 MG/G
CREAM TOPICAL
Status: DISCONTINUED | OUTPATIENT
Start: 2021-12-27 | End: 2021-12-27 | Stop reason: HOSPADM

## 2021-12-27 RX ORDER — NALOXONE HYDROCHLORIDE 0.4 MG/ML
0.2 INJECTION, SOLUTION INTRAMUSCULAR; INTRAVENOUS; SUBCUTANEOUS
Status: DISCONTINUED | OUTPATIENT
Start: 2021-12-27 | End: 2021-12-28 | Stop reason: HOSPADM

## 2021-12-27 RX ORDER — EPHEDRINE SULFATE 50 MG/ML
INJECTION, SOLUTION INTRAMUSCULAR; INTRAVENOUS; SUBCUTANEOUS PRN
Status: DISCONTINUED | OUTPATIENT
Start: 2021-12-27 | End: 2021-12-27

## 2021-12-27 RX ORDER — PROCHLORPERAZINE MALEATE 5 MG
10 TABLET ORAL EVERY 6 HOURS PRN
Status: DISCONTINUED | OUTPATIENT
Start: 2021-12-27 | End: 2021-12-28 | Stop reason: HOSPADM

## 2021-12-27 RX ORDER — AMOXICILLIN 250 MG
1 CAPSULE ORAL 2 TIMES DAILY
Qty: 60 TABLET | Refills: 0 | Status: SHIPPED | OUTPATIENT
Start: 2021-12-28 | End: 2022-10-31

## 2021-12-27 RX ORDER — LIDOCAINE 40 MG/G
CREAM TOPICAL
Status: DISCONTINUED | OUTPATIENT
Start: 2021-12-27 | End: 2021-12-28 | Stop reason: HOSPADM

## 2021-12-27 RX ORDER — ONDANSETRON 2 MG/ML
4 INJECTION INTRAMUSCULAR; INTRAVENOUS EVERY 30 MIN PRN
Status: DISCONTINUED | OUTPATIENT
Start: 2021-12-27 | End: 2021-12-27 | Stop reason: HOSPADM

## 2021-12-27 RX ORDER — OXYCODONE HYDROCHLORIDE 5 MG/1
5 TABLET ORAL EVERY 4 HOURS PRN
Status: DISCONTINUED | OUTPATIENT
Start: 2021-12-27 | End: 2021-12-28 | Stop reason: HOSPADM

## 2021-12-27 RX ORDER — METHOCARBAMOL 750 MG/1
750 TABLET, FILM COATED ORAL EVERY 6 HOURS PRN
Status: DISCONTINUED | OUTPATIENT
Start: 2021-12-27 | End: 2021-12-28

## 2021-12-27 RX ORDER — ACETAMINOPHEN 325 MG/1
650 TABLET ORAL EVERY 4 HOURS PRN
Qty: 90 TABLET | Refills: 0 | Status: SHIPPED | OUTPATIENT
Start: 2021-12-27

## 2021-12-27 RX ORDER — POLYETHYLENE GLYCOL 3350 17 G/17G
17 POWDER, FOR SOLUTION ORAL DAILY
Status: DISCONTINUED | OUTPATIENT
Start: 2021-12-28 | End: 2021-12-28 | Stop reason: HOSPADM

## 2021-12-27 RX ORDER — BISACODYL 10 MG
10 SUPPOSITORY, RECTAL RECTAL DAILY PRN
Status: DISCONTINUED | OUTPATIENT
Start: 2021-12-27 | End: 2021-12-28 | Stop reason: HOSPADM

## 2021-12-27 RX ORDER — PROPOFOL 10 MG/ML
INJECTION, EMULSION INTRAVENOUS PRN
Status: DISCONTINUED | OUTPATIENT
Start: 2021-12-27 | End: 2021-12-27

## 2021-12-27 RX ORDER — HYDROMORPHONE HYDROCHLORIDE 1 MG/ML
0.2 INJECTION, SOLUTION INTRAMUSCULAR; INTRAVENOUS; SUBCUTANEOUS EVERY 5 MIN PRN
Status: DISCONTINUED | OUTPATIENT
Start: 2021-12-27 | End: 2021-12-27 | Stop reason: HOSPADM

## 2021-12-27 RX ORDER — DEXAMETHASONE SODIUM PHOSPHATE 4 MG/ML
INJECTION, SOLUTION INTRA-ARTICULAR; INTRALESIONAL; INTRAMUSCULAR; INTRAVENOUS; SOFT TISSUE PRN
Status: DISCONTINUED | OUTPATIENT
Start: 2021-12-27 | End: 2021-12-27

## 2021-12-27 RX ORDER — AMOXICILLIN 250 MG
1 CAPSULE ORAL 2 TIMES DAILY
Status: DISCONTINUED | OUTPATIENT
Start: 2021-12-27 | End: 2021-12-28 | Stop reason: HOSPADM

## 2021-12-27 RX ORDER — OXYCODONE HYDROCHLORIDE 10 MG/1
10 TABLET ORAL EVERY 4 HOURS PRN
Status: DISCONTINUED | OUTPATIENT
Start: 2021-12-27 | End: 2021-12-28 | Stop reason: HOSPADM

## 2021-12-27 RX ORDER — CEFAZOLIN SODIUM IN 0.9 % NACL 3 G/100 ML
3 INTRAVENOUS SOLUTION, PIGGYBACK (ML) INTRAVENOUS SEE ADMIN INSTRUCTIONS
Status: DISCONTINUED | OUTPATIENT
Start: 2021-12-27 | End: 2021-12-27 | Stop reason: HOSPADM

## 2021-12-27 RX ORDER — ONDANSETRON 2 MG/ML
4 INJECTION INTRAMUSCULAR; INTRAVENOUS EVERY 6 HOURS PRN
Status: DISCONTINUED | OUTPATIENT
Start: 2021-12-27 | End: 2021-12-28 | Stop reason: HOSPADM

## 2021-12-27 RX ORDER — HYDROMORPHONE HCL IN WATER/PF 6 MG/30 ML
0.4 PATIENT CONTROLLED ANALGESIA SYRINGE INTRAVENOUS
Status: DISCONTINUED | OUTPATIENT
Start: 2021-12-27 | End: 2021-12-28

## 2021-12-27 RX ORDER — ONDANSETRON 4 MG/1
4 TABLET, ORALLY DISINTEGRATING ORAL EVERY 6 HOURS PRN
Status: DISCONTINUED | OUTPATIENT
Start: 2021-12-27 | End: 2021-12-28 | Stop reason: HOSPADM

## 2021-12-27 RX ORDER — ACETAMINOPHEN 325 MG/1
975 TABLET ORAL EVERY 8 HOURS
Status: DISCONTINUED | OUTPATIENT
Start: 2021-12-27 | End: 2021-12-28 | Stop reason: HOSPADM

## 2021-12-27 RX ADMIN — INDOCYANINE GREEN AND WATER 12.5 MG: KIT at 10:03

## 2021-12-27 RX ADMIN — Medication 5 MG: at 11:09

## 2021-12-27 RX ADMIN — Medication 3 G: at 07:55

## 2021-12-27 RX ADMIN — ACETAMINOPHEN 975 MG: 325 TABLET, FILM COATED ORAL at 07:22

## 2021-12-27 RX ADMIN — HYDROMORPHONE HYDROCHLORIDE 0.5 MG: 1 INJECTION, SOLUTION INTRAMUSCULAR; INTRAVENOUS; SUBCUTANEOUS at 11:47

## 2021-12-27 RX ADMIN — SODIUM CHLORIDE, POTASSIUM CHLORIDE, SODIUM LACTATE AND CALCIUM CHLORIDE: 600; 310; 30; 20 INJECTION, SOLUTION INTRAVENOUS at 07:38

## 2021-12-27 RX ADMIN — ROCURONIUM BROMIDE 20 MG: 50 INJECTION, SOLUTION INTRAVENOUS at 10:12

## 2021-12-27 RX ADMIN — ONDANSETRON 4 MG: 2 INJECTION INTRAMUSCULAR; INTRAVENOUS at 11:30

## 2021-12-27 RX ADMIN — ROCURONIUM BROMIDE 20 MG: 50 INJECTION, SOLUTION INTRAVENOUS at 09:35

## 2021-12-27 RX ADMIN — DEXAMETHASONE SODIUM PHOSPHATE 4 MG: 4 INJECTION, SOLUTION INTRA-ARTICULAR; INTRALESIONAL; INTRAMUSCULAR; INTRAVENOUS; SOFT TISSUE at 08:25

## 2021-12-27 RX ADMIN — DEXMEDETOMIDINE HYDROCHLORIDE 0.17 MCG/KG/HR: 4 INJECTION, SOLUTION INTRAVENOUS at 08:28

## 2021-12-27 RX ADMIN — DOCUSATE SODIUM 50 MG AND SENNOSIDES 8.6 MG 1 TABLET: 8.6; 5 TABLET, FILM COATED ORAL at 20:16

## 2021-12-27 RX ADMIN — ROCURONIUM BROMIDE 20 MG: 50 INJECTION, SOLUTION INTRAVENOUS at 09:09

## 2021-12-27 RX ADMIN — Medication 10 MG: at 07:44

## 2021-12-27 RX ADMIN — Medication 10 MG: at 11:16

## 2021-12-27 RX ADMIN — HYDROMORPHONE HYDROCHLORIDE 0.4 MG: 0.2 INJECTION, SOLUTION INTRAMUSCULAR; INTRAVENOUS; SUBCUTANEOUS at 16:31

## 2021-12-27 RX ADMIN — SUGAMMADEX 400 MG: 100 INJECTION, SOLUTION INTRAVENOUS at 11:47

## 2021-12-27 RX ADMIN — ROCURONIUM BROMIDE 50 MG: 50 INJECTION, SOLUTION INTRAVENOUS at 07:47

## 2021-12-27 RX ADMIN — PROPOFOL 200 MG: 10 INJECTION, EMULSION INTRAVENOUS at 07:43

## 2021-12-27 RX ADMIN — ACETAMINOPHEN 975 MG: 325 TABLET, FILM COATED ORAL at 16:31

## 2021-12-27 RX ADMIN — HYDROMORPHONE HYDROCHLORIDE 0.2 MG: 1 INJECTION, SOLUTION INTRAMUSCULAR; INTRAVENOUS; SUBCUTANEOUS at 13:35

## 2021-12-27 RX ADMIN — LIDOCAINE HYDROCHLORIDE 100 MG: 20 INJECTION, SOLUTION INFILTRATION; PERINEURAL at 07:43

## 2021-12-27 RX ADMIN — FENTANYL CITRATE 100 MCG: 50 INJECTION, SOLUTION INTRAMUSCULAR; INTRAVENOUS at 07:43

## 2021-12-27 RX ADMIN — OXYCODONE HYDROCHLORIDE 10 MG: 10 TABLET ORAL at 22:53

## 2021-12-27 RX ADMIN — HYDROMORPHONE HYDROCHLORIDE 0.2 MG: 1 INJECTION, SOLUTION INTRAMUSCULAR; INTRAVENOUS; SUBCUTANEOUS at 14:11

## 2021-12-27 RX ADMIN — ACETAMINOPHEN 975 MG: 325 TABLET, FILM COATED ORAL at 22:53

## 2021-12-27 RX ADMIN — MIDAZOLAM 2 MG: 1 INJECTION INTRAMUSCULAR; INTRAVENOUS at 07:35

## 2021-12-27 RX ADMIN — ROCURONIUM BROMIDE 20 MG: 50 INJECTION, SOLUTION INTRAVENOUS at 08:45

## 2021-12-27 RX ADMIN — HYDROMORPHONE HYDROCHLORIDE 0.4 MG: 0.2 INJECTION, SOLUTION INTRAMUSCULAR; INTRAVENOUS; SUBCUTANEOUS at 20:16

## 2021-12-27 RX ADMIN — OXYCODONE HYDROCHLORIDE 10 MG: 10 TABLET ORAL at 18:24

## 2021-12-27 RX ADMIN — PROPOFOL 150 MG: 10 INJECTION, EMULSION INTRAVENOUS at 07:47

## 2021-12-27 RX ADMIN — PROPOFOL 20 MG: 10 INJECTION, EMULSION INTRAVENOUS at 08:45

## 2021-12-27 ASSESSMENT — MIFFLIN-ST. JEOR: SCORE: 2561

## 2021-12-27 ASSESSMENT — ACTIVITIES OF DAILY LIVING (ADL)
ADLS_ACUITY_SCORE: 3
ADLS_ACUITY_SCORE: 4
ADLS_ACUITY_SCORE: 3
ADLS_ACUITY_SCORE: 5
ADLS_ACUITY_SCORE: 5
ADLS_ACUITY_SCORE: 3
ADLS_ACUITY_SCORE: 5
ADLS_ACUITY_SCORE: 4

## 2021-12-27 NOTE — PROGRESS NOTES
Care Management Discharge Note/Plan for Discharge December 28, 2021    Discharge Date: 12/27/2021     Discharge Disposition:  Home with [employer] wound vac.    Discharge Services:  None.  Will follow up with Dr. Ivy in clinic for wound management.    Discharge DME:  [employer] Wound Vac.  Verified via [employer].    Discharge Transportation:  Family.    Private pay costs discussed: Not applicable    Education Provided on the Discharge Plan:  Yes by MD.  Persons Notified of Discharge Plans: patient  Patient/Family in Agreement with the Plan:  yes    Handoff Referral Completed: Yes    Additional Information:    Home tomorrow and follow up in clinic per MD team.  [facility] Home Wound Vac System approved.  No home care needed because vac will remain in place and be removed by plastic surgeon and updated would care needs will take place in the clinic setting.    Inpatient cares continue per MD team plans of care. Inpatient Care Coordinator RN will be available.    BELKIS Gant.S.DECLAN., R.N., P.H.N..  Care Coordinator     Pager   [facility]

## 2021-12-27 NOTE — LETTER
"Transition Communication Hand-off for Care Transitions to Next Level of Care Provider    Name: Margarito Sofia  : 1985  MRN #: [id number]  Primary Care Provider: Physician No Ref-Primary     Primary Clinic: No address on file     Reason for Hospitalization:  Hidradenitis suppurativa [L73.2]  Admit Date/Time: 2021  5:24 AM  Discharge Date: ***  Payor Source: Payor: MEDICA / Plan: MEDICA CHOICE / Product Type: Indemnity /     Readmission Assessment Measure (DANIEL) Risk Score/category: ***    Plan of Care Goals/Milestone Events:   Patient Concerns: ***   Patient Goals:   Short-term ***   Long-term ***   Medical Goals   Short-term ***   Long-term ***         Reason for Communication Hand-off Referral: {CCREASONCMCTNHANDOFFREFERRALCTS:01842}    Discharge Plan:       Concern for non-adherence with plan of care:   Y/N ***  Discharge Needs Assessment:  Needs      Most Recent Value   Equipment Currently Used at Home none          Already enrolled in Tele-monitoring program and name of program:  ***  Follow-up specialty is recommended: {YES / NO:67123::\"Yes\"}    Follow-up plan:    Future Appointments   Date Time Provider Department Center   2022 10:15 AM SHELBY Ivy MD [facility]   2022 10:30 AM Natali Sabillon RN [facility]   2022 10:30 AM SHELBY Ivy MD [facility]   2022  2:30 PM [facility] [place] [facility]       Any outstanding tests or procedures:              Key Recommendations:      Khadra Bishop RN    AVS/Discharge Summary is the source of truth; this is a helpful guide for improved communication of patient story          "

## 2021-12-27 NOTE — PROGRESS NOTES
This is a recent snapshot of the patient's [facility] medical record.  For current drug dose and complete information and questions, call [phone] or In Basket pool, [facility] home infusion ([id number])  CSN Number:  [id number]

## 2021-12-27 NOTE — ANESTHESIA POSTPROCEDURE EVALUATION
Patient: Margarito Sofia    Procedure: Procedure(s):  Left axillary and wound excision with latissimus flap and split thickness skin graft, spy, neck wound excision and closure       Diagnosis:Hidradenitis suppurativa [L73.2]  Diagnosis Additional Information: No value filed.    Anesthesia Type:  General    Note:  Disposition: Admission   Postop Pain Control: Uneventful            Sign Out: Well controlled pain   PONV:    Neuro/Psych: Uneventful            Sign Out: Acceptable/Baseline neuro status   Airway/Respiratory: Uneventful            Sign Out: Acceptable/Baseline resp. status   CV/Hemodynamics: Uneventful            Sign Out: Acceptable CV status; No obvious hypovolemia; No obvious fluid overload   Other NRE: NONE   DID A NON-ROUTINE EVENT OCCUR? No           Last vitals:  Vitals Value Taken Time   /63 12/27/21 1350   Temp 37  C (98.6  F) 12/27/21 1201   Pulse 85 12/27/21 1358   Resp 17 12/27/21 1358   SpO2 96 % 12/27/21 1358   Vitals shown include unvalidated device data.    Electronically Signed By: Loc Noriega MD  December 27, 2021  1:59 PM

## 2021-12-27 NOTE — PROGRESS NOTES
Care Management Follow Up    Length of Stay (days): 0    Concerns to be Addressed: Discharge planning, wound vac       Anticipated Discharge Disposition: Home     Anticipated Discharge Services: TBD  Anticipated Discharge DME: Home Wound VAC    Referrals Placed by CM/SW: [facility] for home wound VAC   Private pay costs discussed: Not applicable    Additional Information:  This writer received update that pt in OR and will need a home wound vac ordered for discharge. Per chart review wound VAC was placed over axillary incision and skin graft. Wound vac order (ready VAC) completed on [employer] website and sent to MD for electronic signature. Paged resident to update. Awaiting insurance approval.     CC will continue to monitor patient's medical condition and progress towards discharge.  Tarah Ayon RN BSN  6C Unit Care Coordinator  Phone number: [phone]  Pager: [phone]

## 2021-12-27 NOTE — OP NOTE
Procedure Date: 12/27/2021    PREOPERATIVE DIAGNOSES:  Left axillary hidradenitis suppurativa and posterior neck hidradenitis suppurativa.    POSTOPERATIVE DIAGNOSES:  Left axillary hidradenitis suppurativa and posterior neck hidradenitis suppurativa.    PROCEDURES PERFORMED:  1.  Left axillary wound excision including skin and subcutaneous tissue with dimensions approximately 15 x 15 cm with reconstruction using left latissimus dorsi muscle flap and split-thickness skin graft from left back measuring 15 x 15 cm2.  2.  Posterior neck hidradenitis suppurativa excision, including skin and subcutaneous tissue, total length of approximately 15 cm, closed in a layered fashion.  3.  Intraoperative chemical angiography using the SPY Elite system (this included supervision of intravenous ICG dye injection and interpretation of the angiogram; indication of use was to evaluate the blood flow to the TDAP flap to plan appropriate flap reconstruction).    SURGEON:  Bethany Ivy MD    RESIDENT:  Pedro Luis Corona MD    ANESTHESIA:  General anesthesia with endotracheal tube intubation.    COMPLICATIONS:  Nil.    DRAINS:  A 15-Romansh Wilder drain in the back.    SPECIMENS:  1.  Left axillary wound.  2.  Posterior neck wound.    ESTIMATED BLOOD LOSS:  150 mL.    DESCRIPTION OF PROCEDURE:  After informed consent was taken from the patient and the proper site and procedure were ascertained with him and he was appropriately marked, he was taken to the operating room.  He was placed in supine position with his knees comfortably flexed, pillows underneath them and pneumoboots placed and running prior to induction of anesthesia.  Preoperative antibiotics given in the OR.  All pressure points were appropriately padded.  General anesthesia was administered without any complications.  He was then placed in the left lateral position with his side up and his left arm free, such that we could get to the left axilla, back, and his posterior neck.  He  had an axillary roll placed to keep pressure off of the axilla.  He was then secured appropriately, appropriate padding placed and then prepped and draped in standard surgical fashion.    We began first at the axilla.  Marked out the area that needed to be excised, approximately 15 x 15 cm.  It was excised including skin, subcutaneous tissue down to the axilla.  None of the deep structures were exposed.  Primary closure could not be obtained.  The wound was thoroughly irrigated with antibiotic irrigation and Betadine, reprepped and draped and started the closure portion.  I decided to use a left-sided thoracodorsal artery  based flap.  Tried to Doppler out a  near the wound in the axillary area, but it was very difficult to find one.  Nonetheless, rusty out the proposed island of skin that I would use to close the wound defect based on the anterior border of the latissimus dorsi muscle.  I then made the lateral incision, dissected through subcutaneous tissues down to the deep fascia, identified the anterior border of the latissimus muscle.  I then elevated the skin island without making the medial incision yet from a lateral to medial direction, looking for any perforators that I could not Doppler, but at least could try and visualize.  I ultimately find one, but it was small and quite a distance away from the axilla.  Nonetheless, we tried to Doppler that on the skin, found a light signal and based my island of skin on this .  Cut the medial incision, islandized the flap and then raised the distal and proximal skin flaps off the underlying muscle to the .  As soon as we got to the , the flap seemed to be perfused, but there was venous congestion in the flap.  The patient's flap was extremely thick, large and it seemed like the venous outflow was not enough of this flap, even though the Doppler signal was in place.  A chemical angiography using the SPY Elite system  with 0.5 mL of ICG dye injected and tried to look at the blood flow to the flap, which showed that half of the flap lit up nicely, but the other distal half did not.  This also did not give us enough flap dimensions to give us closure of the defect in the axilla.  Based on this finding, my decision was not to do the TDAP flap and in fact use the underlying latissimus muscle as a rotation flap to cover the defect in the axilla and then skin graft that using the skin of the TDAP flap that I had raised.  I isolated the latissimus that I needed, cut distally, elevated it towards the axilla, identifying the thoracodorsal pedicle and protecting it.  Once the flap had been elevated to a point I could rotate without any tension, I stopped the dissection.  At this point, I ensured hemostasis, closed temporarily the donor site in the back with staples and inset the latissimus muscle in the axilla and stapled it into position.  I then harvested the skin from the TDAP flap that had been removed using a Pawan dermatome at 0.015-inch thickness mesh in a 1.5:1 manner.  Now, I turned my attention back to the axilla.  Sewed the muscle flap into the axilla using 2-0 Monocryl suture in a running manner, closed the donor site with 0 PDS suture, 2-0 Monocryl suture in the deep dermal layer and staples.  I then placed the split thickness skin graft over the muscle, stapled it into position and then placed a VAC dressing over the entire axilla as well as an incisional VAC over the back incision.  Once this was completed, I turned my attention to the neck.  The hidradenitis suppurativa area was marked out, cut using electrocautery through the subcutaneous tissues down to the deep fascia, completely excised, irrigated it out and ensured hemostasis, cleaned it with Betadine and then closed using 0 PDS suture in a deep dermal fashion to approximate the skin and then the skin was closed using 0 PDS suture in interrupted fashion, keeping it  relatively open, so it could drain.  A Xeroform dry gauze and dressing was placed.  The patient tolerated the procedure well.  All counts were correct at the end of the case.  The patient was extubated and sent to recovery room in stable condition.    SHELBY Ivy MD        D: 2021   T: 2021   MT: [id number]    Name:     JANEL LOPEZ  MRN:      [id number]        Account:        [id number]   :      1985           Procedure Date: 2021     Document: [id number]

## 2021-12-27 NOTE — ANESTHESIA CARE TRANSFER NOTE
Patient: Margarito Sofia    Procedure: Procedure(s):  Left axillary and wound excision with latissimus flap and split thickness skin graft, spy, neck wound excision and closure       Diagnosis: Hidradenitis suppurativa [L73.2]  Diagnosis Additional Information: No value filed.    Anesthesia Type:   General     Note:    Oropharynx: oral airway in place  Level of Consciousness: drowsy  Oxygen Supplementation: face mask  Level of Supplemental Oxygen (L/min / FiO2): 6  Independent Airway: airway patency satisfactory and stable  Dentition: dentition unchanged  Vital Signs Stable: post-procedure vital signs reviewed and stable  Report to RN Given: handoff report given  Patient transferred to: PACU  Comments: VSS. Breathing spontaneously at a regular rate with adequate tidal volumes and maintaining O2 sats. Oral airway in place as he wakes up, airway patent. No apparent complications from anesthesia.     Jagjit Montenegro MD   Anesthesia CA-1    Handoff Report: Identifed the Patient, Identified the Reponsible Provider, Reviewed the pertinent medical history, Discussed the surgical course, Reviewed Intra-OP anesthesia mangement and issues during anesthesia, Set expectations for post-procedure period and Allowed opportunity for questions and acknowledgement of understanding      Vitals:  Vitals Value Taken Time   BP 89/55 12/27/21 1210   Temp     Pulse 84 12/27/21 1214   Resp     SpO2 94 % 12/27/21 1214   Vitals shown include unvalidated device data.    Electronically Signed By: Jagjit Montenegro MD  December 27, 2021  12:14 PM

## 2021-12-27 NOTE — ANESTHESIA PROCEDURE NOTES
Airway       Patient location during procedure: OR       Procedure Start/Stop Times: 12/27/2021 7:52 AM  Staff -        Anesthesiologist:  Aneudy Gallardo MD       Resident/Fellow: Jagjit Montenegro MD       Performed By: resident  Consent for Airway        Urgency: elective  Indications and Patient Condition       Indications for airway management: jack-procedural         Mask difficulty assessment: 2 - vent by mask + OA or adjuvant +/- NMBA    Final Airway Details       Final airway type: endotracheal airway       Successful airway: ETT - single  Endotracheal Airway Details        ETT size (mm): 7.5       Cuffed: yes       Successful intubation technique: video laryngoscopy       VL Blade Size: MAC 4       Grade View of Cords: 1       Adjucts: stylet       Position: Right       Measured from: gums/teeth       Secured at (cm): 24       Bite block used: Soft    Post intubation assessment        Placement verified by: capnometry, equal breath sounds and chest rise        Number of attempts at approach: 1       Number of other approaches attempted: 0       Secured with: other (comment) (tube tamer)       Ease of procedure: easy       Dentition: Intact and Unchanged    Additional Comments       Redundant tissue, long epiglottis. Likely difficult DL.

## 2021-12-27 NOTE — PLAN OF CARE
POD# 0 L axillary and wound excision w/ latissimus flap and split thickness skin graft, spy, neck wound excision and closure    Report received from Bekah PACU RN. Pt arrived to  via stretcher around 1500H. Sleepy but easily arouseable. Pain is manageable as of this time. JASWINDER left lateral back with dark red drainage, left lateral incision with wound vac at 125 mmhg. Back neck incision with dressing with minimal serosanguinous drainage. Post op vital signs initiated and as follows: Blood pressure 111/57, pulse 89, temperature 97  F (36.1  C), temperature source Oral, resp. rate 17, height 1.829 m (6'), weight (!) 160.3 kg (353 lb 6.4 oz), SpO2 97 %. PIV right hand SL. Orders released, waiting for pharmacist to verified orders. Pt already walked in from outside of the room to his bed.    PLAN: Continue with the post op cares. Incentive spirometer, pain and incisional management. Due to void at 1800H.      Admitted/transferred from: PACU  2 RN full   skin assessment completed by Lynda Craig RN and Yakelin Mena RN  Skin assessment finding: other surgical incision back of the neck, left lateral back with wound vac, generalized bumps underneath the abdominal fold due to hidradenitis.    Interventions/actions: skin interventions No change adjustment as of this time.      Will continue to monitor.

## 2021-12-27 NOTE — BRIEF OP NOTE
[facility]    Brief Operative Note    Pre-operative diagnosis: Hidradenitis suppurativa [L73.2]  Post-operative diagnosis Same as pre-operative diagnosis    Procedure: Procedure(s):  Left axillary and wound excision with latissimus flap and split thickness skin graft, spy, neck wound excision and closure  Surgeon: Surgeon(s) and Role:     * SHELBY Ivy MD - Primary          Serena Corona MD Resident PGY1  Anesthesia: General   Estimated Blood Loss: 150mL     Drains: Left axillary drain JASWINDER   Specimens:   ID Type Source Tests Collected by Time Destination   1 : Left axillary wound Tissue Axilla, Left SURGICAL PATHOLOGY EXAM SHELBY Ivy MD 12/27/2021  8:56 AM    2 : Neck wound Tissue Other SURGICAL PATHOLOGY EXAM SHELBY Ivy MD 12/27/2021 10:57 AM      Findings:   Infected hidradenitis of neck   Complications: None.  Implants: * No implants in log *     -Wound vac for axillary incision and skin grft.    -Xeroform dry gauze for neck dressing, allow to drain   -Pt should be placed in sling     Serena Corona PGY1 Plastic Surgery

## 2021-12-28 ENCOUNTER — HOME INFUSION (PRE-WILLOW HOME INFUSION) (OUTPATIENT)
Dept: PHARMACY | Facility: CLINIC | Age: 36
End: 2021-12-28
Payer: COMMERCIAL

## 2021-12-28 ENCOUNTER — PATIENT OUTREACH (OUTPATIENT)
Dept: PLASTIC SURGERY | Facility: CLINIC | Age: 36
End: 2021-12-28
Payer: COMMERCIAL

## 2021-12-28 VITALS
SYSTOLIC BLOOD PRESSURE: 122 MMHG | RESPIRATION RATE: 18 BRPM | WEIGHT: 315 LBS | HEART RATE: 97 BPM | DIASTOLIC BLOOD PRESSURE: 77 MMHG | HEIGHT: 72 IN | OXYGEN SATURATION: 95 % | BODY MASS INDEX: 42.66 KG/M2 | TEMPERATURE: 98.3 F

## 2021-12-28 LAB
ANION GAP SERPL CALCULATED.3IONS-SCNC: 8 MMOL/L (ref 3–14)
BUN SERPL-MCNC: 14 MG/DL (ref 7–30)
CALCIUM SERPL-MCNC: 8.7 MG/DL (ref 8.5–10.1)
CHLORIDE BLD-SCNC: 104 MMOL/L (ref 94–109)
CO2 SERPL-SCNC: 24 MMOL/L (ref 20–32)
CREAT SERPL-MCNC: 0.82 MG/DL (ref 0.66–1.25)
ERYTHROCYTE [DISTWIDTH] IN BLOOD BY AUTOMATED COUNT: 13.8 % (ref 10–15)
GFR SERPL CREATININE-BSD FRML MDRD: >90 ML/MIN/1.73M2
GLUCOSE BLD-MCNC: 148 MG/DL (ref 70–99)
GLUCOSE BLDC GLUCOMTR-MCNC: 147 MG/DL (ref 70–99)
HCT VFR BLD AUTO: 43.9 % (ref 40–53)
HGB BLD-MCNC: 14.2 G/DL (ref 13.3–17.7)
MAGNESIUM SERPL-MCNC: 2.4 MG/DL (ref 1.6–2.3)
MCH RBC QN AUTO: 29.6 PG (ref 26.5–33)
MCHC RBC AUTO-ENTMCNC: 32.3 G/DL (ref 31.5–36.5)
MCV RBC AUTO: 92 FL (ref 78–100)
PLATELET # BLD AUTO: 324 10E3/UL (ref 150–450)
POTASSIUM BLD-SCNC: 4.1 MMOL/L (ref 3.4–5.3)
RBC # BLD AUTO: 4.79 10E6/UL (ref 4.4–5.9)
SODIUM SERPL-SCNC: 136 MMOL/L (ref 133–144)
WBC # BLD AUTO: 17.6 10E3/UL (ref 4–11)

## 2021-12-28 PROCEDURE — 36415 COLL VENOUS BLD VENIPUNCTURE: CPT

## 2021-12-28 PROCEDURE — 85027 COMPLETE CBC AUTOMATED: CPT

## 2021-12-28 PROCEDURE — 250N000013 HC RX MED GY IP 250 OP 250 PS 637

## 2021-12-28 PROCEDURE — 82310 ASSAY OF CALCIUM: CPT

## 2021-12-28 PROCEDURE — 83735 ASSAY OF MAGNESIUM: CPT

## 2021-12-28 PROCEDURE — 250N000013 HC RX MED GY IP 250 OP 250 PS 637: Performed by: STUDENT IN AN ORGANIZED HEALTH CARE EDUCATION/TRAINING PROGRAM

## 2021-12-28 PROCEDURE — L3670 SO ACRO/CLAV CAN WEB PRE OTS: HCPCS

## 2021-12-28 PROCEDURE — 250N000011 HC RX IP 250 OP 636

## 2021-12-28 RX ORDER — DIAZEPAM 5 MG
5-10 TABLET ORAL EVERY 6 HOURS PRN
Qty: 15 TABLET | Refills: 0 | Status: SHIPPED | OUTPATIENT
Start: 2021-12-28 | End: 2022-10-31

## 2021-12-28 RX ORDER — DIAZEPAM 5 MG
5-10 TABLET ORAL EVERY 6 HOURS PRN
Status: DISCONTINUED | OUTPATIENT
Start: 2021-12-28 | End: 2021-12-28 | Stop reason: HOSPADM

## 2021-12-28 RX ORDER — DIAZEPAM 5 MG
15 TABLET ORAL EVERY 6 HOURS PRN
Status: DISCONTINUED | OUTPATIENT
Start: 2021-12-28 | End: 2021-12-28

## 2021-12-28 RX ORDER — DIAZEPAM 5 MG
5 TABLET ORAL EVERY 6 HOURS PRN
Status: DISCONTINUED | OUTPATIENT
Start: 2021-12-28 | End: 2021-12-28

## 2021-12-28 RX ADMIN — HYDROMORPHONE HYDROCHLORIDE 0.4 MG: 0.2 INJECTION, SOLUTION INTRAMUSCULAR; INTRAVENOUS; SUBCUTANEOUS at 04:55

## 2021-12-28 RX ADMIN — METHOCARBAMOL 750 MG: 750 TABLET ORAL at 13:52

## 2021-12-28 RX ADMIN — DOCUSATE SODIUM 50 MG AND SENNOSIDES 8.6 MG 1 TABLET: 8.6; 5 TABLET, FILM COATED ORAL at 08:08

## 2021-12-28 RX ADMIN — POLYETHYLENE GLYCOL 3350 17 G: 17 POWDER, FOR SOLUTION ORAL at 08:08

## 2021-12-28 RX ADMIN — ACETAMINOPHEN 975 MG: 325 TABLET, FILM COATED ORAL at 14:44

## 2021-12-28 RX ADMIN — DIAZEPAM 10 MG: 5 TABLET ORAL at 16:03

## 2021-12-28 RX ADMIN — OXYCODONE HYDROCHLORIDE 10 MG: 10 TABLET ORAL at 12:24

## 2021-12-28 RX ADMIN — OXYCODONE HYDROCHLORIDE 10 MG: 10 TABLET ORAL at 08:07

## 2021-12-28 RX ADMIN — ACETAMINOPHEN 975 MG: 325 TABLET, FILM COATED ORAL at 08:08

## 2021-12-28 RX ADMIN — METHOCARBAMOL 750 MG: 750 TABLET ORAL at 08:08

## 2021-12-28 ASSESSMENT — ACTIVITIES OF DAILY LIVING (ADL)
ADLS_ACUITY_SCORE: 5
ADLS_ACUITY_SCORE: 7
ADLS_ACUITY_SCORE: 7
ADLS_ACUITY_SCORE: 5
ADLS_ACUITY_SCORE: 5
ADLS_ACUITY_SCORE: 7
ADLS_ACUITY_SCORE: 5
ADLS_ACUITY_SCORE: 7

## 2021-12-28 NOTE — DISCHARGE SUMMARY
[facility]  Discharge Summary  Plastic Surgery     Margarito Sofia MRN# [id number]   YOB: 1985 Age: 36 year old     Date of Admission:  12/27/2021  Date of Discharge::  12/28/2021  Admitting Physician:  SHELBY Ivy MD  Discharge Physician:  Dr. Ivy   Primary Care Physician:        No Ref-Primary, Physician          Admission Diagnoses:   Hidradenitis suppurativa [L73.2]            Discharge Diagnosis:   There are no discharge diagnoses documented for the most recent discharge.            Procedures:     Procedure(s):  Left axillary and wound excision with latissimus flap and split thickness skin graft, spy, neck wound excision and closure          Consultations:   SOCIAL WORK IP CONSULT  ORTHOSIS EXTREMITY UPPER REFERRAL IP CONSULT          Brief History of Illness:   Mr. Sofia is 36 years old with hidradenitis suppurativa of the left axilla and posterior neck resistant to conservative management who arrived for surgical removal.  He previously met with Dr. Denny of Dermatology and his perioperative immunotherapy and antibiotic treatment was discussed and. He underwent a left axillary wound excision with LDTAP and STSG, posterior neck excision and primary closure on 12/27/21.            Hospital Course:     The patient was admitted and underwent the above procedure. The patient tolerated the procedure well. There were no complications. Postoperatively the patient was transferred to the general floor for further care. The patient's diet was slowly advanced as bowel function returned. Pain was controlled with oral pain medication and the patient was able to ambulate and void without difficulty. The patient received appropriate education post operatively. On POD 1 the patient was discharged to home with appropriate instructions and follow up. Patient discharged with a wound vac to the skin graft and axillary incision sites. Pt also discharged with a JASWINDER drain.  Patient's neck wound sutured with allowable drainage. Pt discharged with muscle relaxant and appropriate follow up scheduled. The patient acknowledged understanding and were in agreement with the plan.           Imaging Studies:   No results found for this or any previous visit.           Medications Prior to Admission:     No medications prior to admission.              Discharge Medications:     Discharge Medication List as of 12/28/2021 10:44 AM      START taking these medications    Details   acetaminophen (TYLENOL) 325 MG tablet Take 2 tablets (650 mg) by mouth every 4 hours as needed for other (For optimal non-opioid multimodal pain management to improve pain control.), Disp-90 tablet, R-0, E-Prescribe      ondansetron (ZOFRAN-ODT) 4 MG ODT tab Take 1 tablet (4 mg) by mouth every 6 hours as needed for nausea or vomiting, Disp-10 tablet, R-0, E-Prescribe      oxyCODONE (ROXICODONE) 5 MG tablet Take 1 tablet (5 mg) by mouth every 6 hours as needed for breakthrough pain, Disp-15 tablet, R-0, Local Print      polyethylene glycol (MIRALAX) 17 GM/Dose powder Take 17 g by mouth daily, Disp-510 g, R-0, E-Prescribe      senna-docusate (SENOKOT-S/PERICOLACE) 8.6-50 MG tablet Take 1 tablet by mouth 2 times daily, Disp-60 tablet, R-0, E-Prescribe      methocarbamol (ROBAXIN) 750 MG tablet Take 0.5 tablets (375 mg) by mouth every 6 hours as needed for muscle spasms, Disp-20 tablet, R-0, E-Prescribe         CONTINUE these medications which have NOT CHANGED    Details   inFLIXimab (REMICADE) 100 MG injection 700 mg every 28 days , Historical      sulfamethoxazole-trimethoprim (BACTRIM DS) 800-160 MG tablet Take 1 tablet by mouth 2 times daily, Disp-180 tablet, R-1, E-Prescribe      vitamin D3 (CHOLECALCIFEROL) 1.25 MG (85201 UT) capsule Take 1 capsule (50,000 Units) by mouth every 7 days, Disp-12 capsule, R-3, E-Prescribe      Vitamin D3 (CHOLECALCIFEROL) 25 mcg (1000 units) tablet Take 2 tablets (50 mcg) by mouth daily,  Disp-180 tablet, R-4, Local Print      benzoyl peroxide (PANOXYL) 10 % external liquid Use daily as directedDisp-148 mL, [id number]      fluorometholone (FML LIQUIFILM) 0.1 % ophthalmic suspension SHAKE LQ AND INT 1 GTT IN OU D, R-5, Historical      glycopyrrolate (ROBINUL) 1 MG tablet Take 1 tablet (1 mg) by mouth 2 times daily, Disp-60 tablet, R-3, E-Prescribe                     Medications Discontinued or Adjusted During This Hospitalization:   No change           Antibiotics Prescribed at Discharge:   None prescribed           Day of Discharge Physical Exam:   Temp:  [96.5  F (35.8  C)-98.5  F (36.9  C)] 98.3  F (36.8  C)  Pulse:  [94-99] 97  Resp:  [18] 18  BP: (113-122)/(58-79) 122/77  SpO2:  [95 %-96 %] 95 %    General: awake, alert, no acute distress, laying comfortably in bed   CV: warm, well perfused   Pulm: breathing comfortably    Abdomen: soft, non-distended, appropriately tender, no rebound or guarding;     Extremities: no edema, moving all extremities spontaneously and without apparent deficit    Posterior Neck: Incision site is clean, intact  Axillary site: Wound vac in place, functioning appropriately. JASWINDER drain with serosanguinous output.             Final Pathology Result:   Pending            Discharge Instructions and Follow-Up:     Discharge Procedure Orders   Reason for your hospital stay   Order Comments: You were hospitalized due to your recent surgery and and wound vac placement.     Activity   Order Comments: Your activity upon discharge: activity as tolerated    Please do not apply any pressure to left axilla. Wear sling in abduction (as if you're making a wing). Wear sling until follow up.     Order Specific Question Answer Comments   Is discharge order? Yes      Wound care and dressings   Order Comments: Neck surgical site: Please change your neck dressing every so often as it becomes wet. This excision was made to allow for drainage. Please do not be alarmed if the dressing soaks.  Place gauze or other material that absorb fluid on dressing site and xeroform if available. Vaseline also works.    For your axillary surgical site: Please leave the wound vac in place until your follow up visit in 1 week with Dr. Ivy. The vac should not be removed. Avoid getting this site wet. Avoid showering. If you must shower only to the bottom half of your body.Do not let water near the wound vac site.     Please wear sling as much as possible. This will help avoid compressing the graft and flap.     Tubes and drains   Order Comments: You are going home with the following tubes or drains: JASWINDER.  Strip the JASWINDER drain every 4 hours and record the drain output daily. You can record it in mL or the number of times it becomes full. Please bring this log with you to your follow up appointment in ~1 week.     Discharge Instructions   Order Comments: SKIN GRAFT POST-OPERATIVE INSTRUCTIONS    Instructions  ?  Have someone drive you home after surgery and help you at home for 1-2     days.  ?  Get plenty of rest and follow balanced diet.  ?  Decreased activity may promote constipation, so you may want to add     more raw fruit to your diet, and be sure to increase fluid intake.  ? Take pain medication as prescribed. Do not take aspirin or any products     containing aspirin unless approved by your surgeon.  ?  Do not drink alcohol when taking pain medications.  ?  Even when not taking pain medications, no alcohol for 3 weeks as it     causes fluid retention.  ?  If you are taking vitamins with iron, resume these as tolerated.  ?  Do not smoke, as smoking delays healing and increases the risk of     Complications.    Activities  ?  It depends upon where on your body the skin graft was performed.  The area involved     should remain immobile to allow proper healing of the graft.  ?  Do not drive until you are no longer taking narcotics and have been approved to drive      by your surgeon.  ?  No lifting greater than 5-10  pounds for the first 2-3 weeks.    Skin Graft Site Care  ?  Keep the VAC dressing (if used) in place without changing the foam dressing,                  cannister, or settings until your clinic visit.  ?  Keep the VAC dressing plugged to the outlet when possible to prevent loss of                  battery power.  ?  If the VAC dressing leaks or loses suction, please do not take down the dressing.      Rather, try to find the leak and patch it with the adhesive dressing and reseal the             suction.  If unable to do so, call the clinic or hospital (numbers at the end of this               document).   ?  If a bolster dressing is used instead of the VAC dressing, keep it in place until your          clinic visit.    What to Expect  ?  Some swelling and bruising  ?  Slight bleeding  ?  Pain and soreness at sites    When to Call:  ?  If there is leakage of the VAC dressing or constant beeping from the machine and you       cannot take care of it.  ?  If there is constant major leakage from the donor site and you cannot control it.  ?  If you have increased swelling or bruising.  ?  If swelling and redness persist for a few days.  ?  If you have severe or increased pain not relieved by medication.  ?  If you have any side effects to medications; such as, rash, nausea,      headache, vomiting or constipation.  ?  If you have an oral temperature over 100.4 degrees.  ?  If you have any yellowish or greenish drainage from the incisions or      notice a foul odor.  ?  If you develop increased pain in your calves, shortness of breath, or chest     pain.  ?  If you develop any symptoms of concern.     For Medical Questions, Please Call:  ?   [phone], Monday - Friday, 8 a.m. - 4:30 p.m.  ?   After hours and on weekends, call Hospital Paging at [phone] and       ask for the Plastic Surgeon on call.     Diet   Order Comments: Follow this diet upon discharge: Regular Diet.     Order Specific Question Answer  Comments   Is discharge order? Yes               Home Health Care:     Not needed           Discharge Disposition:     Discharged to home      Condition at discharge: Stable    Patient was discussed with staff     Serena Corona  PGY1 Plastic Surgery

## 2021-12-28 NOTE — PLAN OF CARE
Pt vss and A&Ox4. Home vacuum set up and suction set to 125. Abductor sling given and in place- pt educated on how to put on and take off sling. OT came to reassess sling size. Tolerating regular diet well- denies nausea. Pt is passing gas. Discharge paperwork printed, reviewed, and signed by pt and RN. Questions answered. Medication picked up from discharge pharmacy. Transport ride set up for 1530.

## 2021-12-28 NOTE — PROGRESS NOTES
S: [facility] in good spirits. O: I see the EPIC order for the LT abduction sling. A: He was fit with an Ultra sling 4 with abduction pillow to keep his arm away from his body. Instructions were given and seemed to be understood. P: FU PRN. G: The goal is to abduct his LT arm during healing.  Electronically signed Ravindra Molina CPO, LPO.

## 2021-12-28 NOTE — PLAN OF CARE
/72 (BP Location: Right arm)   Pulse 96   Temp 97.4  F (36.3  C) (Oral)   Resp 18   Ht 1.829 m (6')   Wt (!) 160.3 kg (353 lb 6.4 oz)   SpO2 96%   BMI 47.93 kg/m       A&Ox4. VSS ON 2 L O2 via NC. Voiding spontaneously. Up with Ax1. PIV saline locked. JASWINDER left lateral back draining out dark red drainage. Left lateral incision with wound vac @ 125 mmHg. Back neck incision covered with dressing with dried drainage. Continue with plan of care.

## 2021-12-28 NOTE — PROVIDER NOTIFICATION
Notified Resident at 3:10 PM regarding change in condition.      Spoke with: No one    Orders were not obtained.    Comments: Pt about to discharge and having pain and muscle spasms that are unresolved with heat, PO Robaxin, and PO Tylenol

## 2021-12-28 NOTE — PATIENT INSTRUCTIONS
"Returned pt's father Don phone call to discuss pt receiving home care. I updated that since pt was hospitalized, he will have a discharging nurse coordinator assisting with either setting up home care vs setting pt up with wound care clinic. Oscar is nervous about pt getting assistance as he was supposed to be flying in to stay with pt for a week and has been unable to get a flight. Oscar has been communicating with his son but \"believes he is a young kid and doesn't know what to ask.\"  I assured Oscar that his son would get the help he needs as part of his discharge planning.  Farshad RDZ RN  "

## 2021-12-28 NOTE — PLAN OF CARE
/63 (BP Location: Right arm)   Pulse 91   Temp 97.4  F (36.3  C) (Oral)   Resp 17   Ht 1.829 m (6')   Wt (!) 160.3 kg (353 lb 6.4 oz)   SpO2 94%   BMI 47.93 kg/m      Assumed care from 5280-4195. POD 0. VSS on 4L of O2 NC.   Neuro: A&Ox4.  GI/: +BS; Not passing gas yet. Last BM before surgery this morning. Voiding spontaneously.   Diet/appetite: Tolerating full liquid diet.   Activity: Up with Ax1 and ambulated in hallway once.   Skin/drains/Lines: PIV saline locked. JASWINDER left lateral back putting out dark red drainage. Left lateral incision with wound vack set @ 125 mmHg. Back neck incision covered with dressing with scant dried drainage. Continue POC.      Pt will discharge to home tomorrow with portable wound vac and arm immobilizer.

## 2021-12-29 LAB
PATH REPORT.COMMENTS IMP SPEC: NORMAL
PATH REPORT.COMMENTS IMP SPEC: NORMAL
PATH REPORT.FINAL DX SPEC: NORMAL
PATH REPORT.GROSS SPEC: NORMAL
PATH REPORT.MICROSCOPIC SPEC OTHER STN: NORMAL
PHOTO IMAGE: NORMAL

## 2021-12-30 ENCOUNTER — PATIENT OUTREACH (OUTPATIENT)
Dept: PLASTIC SURGERY | Facility: CLINIC | Age: 36
End: 2021-12-30
Payer: COMMERCIAL

## 2022-01-05 ENCOUNTER — OFFICE VISIT (OUTPATIENT)
Dept: WOUND CARE | Facility: CLINIC | Age: 37
End: 2022-01-05
Payer: COMMERCIAL

## 2022-01-05 ENCOUNTER — OFFICE VISIT (OUTPATIENT)
Dept: PLASTIC SURGERY | Facility: CLINIC | Age: 37
End: 2022-01-05
Payer: COMMERCIAL

## 2022-01-05 DIAGNOSIS — L73.2 HIDRADENITIS SUPPURATIVA: ICD-10-CM

## 2022-01-05 DIAGNOSIS — Z98.890 S/P FLAP GRAFT: ICD-10-CM

## 2022-01-05 DIAGNOSIS — L73.2 HIDRADENITIS SUPPURATIVA: Primary | ICD-10-CM

## 2022-01-05 DIAGNOSIS — E66.01 MORBID OBESITY (H): Primary | ICD-10-CM

## 2022-01-05 PROCEDURE — 99024 POSTOP FOLLOW-UP VISIT: CPT | Performed by: PLASTIC SURGERY

## 2022-01-05 PROCEDURE — 99024 POSTOP FOLLOW-UP VISIT: CPT

## 2022-01-05 ASSESSMENT — PAIN SCALES - GENERAL: PAINLEVEL: NO PAIN (0)

## 2022-01-05 NOTE — PROGRESS NOTES
PRESENTING COMPLAINT:  Postoperative visit status post left axillary hidradenitis suppurativa excision with a left latissimus dorsi muscle flap and skin graft, along with posterior neck hidradenitis suppurativa excision and direct closure done 12/27/2021.    HISTORY OF PRESENTING COMPLAINT:  Mr. Sofia is 36 years old and is a week out from surgery, overall doing very well, no major issues.    PHYSICAL EXAMINATION:  Vital signs stable.  He is afebrile, in no obvious distress.  I took down the VAC on the left arm, left axilla.  The flap has healed well.  Skin graft has taken 100%.  No evidence of infection.  No evidence of seroma.  JASWINDER drainage is about 25-30 cc a day.  His posterior neck wound is also infection-free, healing in secondarily as we closed it primarily, but left it loosely open.    ASSESSMENT AND PLAN:  Based on the above findings, a diagnosis of left axillary and posterior neck hidradenitis suppurativa excision and flap closure with skin graft was made.  Plan is now to do daily dressings.  My wound nurse, Neena, will supervise that.  I will see him back next week to remove the drain and then another week later to remove the staples and sutures.  All questions were answered.  He was happy with the visit.  All exam and discussion from beginning to end done in the presence of my nurse, Magi Tolliver.

## 2022-01-05 NOTE — PROGRESS NOTES
Patient comes to wound clinic for wound assessment per request of dr. Ivy. He has history of a Open surgical wound due to : Left axillary wound excision including skin and subcutaneous tissue with dimensions approximately 15 x 15 cm with reconstruction using left latissimus dorsi muscle flap and split-thickness skin graft from left back measuring 15 x 15 cm2.  2.  Posterior neck hidradenitis suppurativa excision, including skin and subcutaneous tissue, total length of approximately 15 cm, closed in a layered fashion.    Clean gloves are donned and current dressing removed.     Objective findings:    Morbid obesity (H)  Hidradenitis suppurativa  S/P flap graft    Number of wounds: 1    Location: left axilla     Type of wound:   Post-operative wound: Yes,     Wound measured: Length 20 cm x Width 15 cm x Depth 0.1 cm ,     Thickness: Full    Drainage: moderate, bloody       Wound specifics:     Tunneling:  N/A      Undermining: N/A    Surgical site assessment: NPWT dressing in use    Wound Base:  Graft     Tenderness: Comfortably manageable    Odor: none    Periwound Skin: intact,           Subjective finding:     Pt states: doing ok    Patient is assessed for discomfort which is: moderate    Today's status of the wound: initial assessment Graft took very well    Treatment:   Wound vac removed. Cleaned with wound spray and covered with Xeroform, and abd pad taped in place.  Pt received the following instructions:    Cleansing with Microklenz Primary Dressing Xeroform. Secondary Dressing: abd pads  Secure with: tape. Frequency:daily or twice if there is a lot of drainage  Wear binder around chest to compress back incision.   Signs and symptoms of infection taught.  Patient needs to be seen 1 week. . Dr. Ivy was available for supervision of care if needed or if questions should arise and regarding plan of care.  Natali Sabillon RN, CWON

## 2022-01-05 NOTE — LETTER
1/5/2022       RE: Margarito Sofia  [address]  [place] [address]     Dear Colleague,    Thank you for referring your patient, Margarito Sofia, to the [facility] PLASTIC AND RECONSTRUCTIVE SURGERY CLINIC [place] at [facility]. Please see a copy of my visit note below.    PRESENTING COMPLAINT:  Postoperative visit status post left axillary hidradenitis suppurativa excision with a left latissimus dorsi muscle flap and skin graft, along with posterior neck hidradenitis suppurativa excision and direct closure done 12/27/2021.    HISTORY OF PRESENTING COMPLAINT:  Mr. Sofia is 36 years old and is a week out from surgery, overall doing very well, no major issues.    PHYSICAL EXAMINATION:  Vital signs stable.  He is afebrile, in no obvious distress.  I took down the VAC on the left arm, left axilla.  The flap has healed well.  Skin graft has taken 100%.  No evidence of infection.  No evidence of seroma.  JASWINDER drainage is about 25-30 cc a day.  His posterior neck wound is also infection-free, healing in secondarily as we closed it primarily, but left it loosely open.    ASSESSMENT AND PLAN:  Based on the above findings, a diagnosis of left axillary and posterior neck hidradenitis suppurativa excision and flap closure with skin graft was made.  Plan is now to do daily dressings.  My wound nurse, Neena, will supervise that.  I will see him back next week to remove the drain and then another week later to remove the staples and sutures.  All questions were answered.  He was happy with the visit.  All exam and discussion from beginning to end done in the presence of my nurse, Maig Tolliver.          Again, thank you for allowing me to participate in the care of your patient.      Sincerely,    SHELBY Ivy MD

## 2022-01-05 NOTE — NURSING NOTE
Chief Complaint   Patient presents with     JOLEEN Garay, is being seen today for a post -op, take down Vac and to see Laura as well, experiencing tenderness tightness in the forearm and axillary area, as reported by patient.       There were no vitals filed for this visit.    There is no height or weight on file to calculate BMI.      Xi Powers LPN

## 2022-01-05 NOTE — LETTER
Date:January 20, 2022      Patient was self referred, no letter generated. Do not send.        [facility] Health Information

## 2022-01-12 ENCOUNTER — OFFICE VISIT (OUTPATIENT)
Dept: PLASTIC SURGERY | Facility: CLINIC | Age: 37
End: 2022-01-12
Payer: COMMERCIAL

## 2022-01-12 VITALS
DIASTOLIC BLOOD PRESSURE: 86 MMHG | HEART RATE: 109 BPM | OXYGEN SATURATION: 98 % | WEIGHT: 315 LBS | SYSTOLIC BLOOD PRESSURE: 133 MMHG | HEIGHT: 72 IN | TEMPERATURE: 98.4 F | BODY MASS INDEX: 42.66 KG/M2

## 2022-01-12 DIAGNOSIS — L73.2 HIDRADENITIS SUPPURATIVA: Primary | ICD-10-CM

## 2022-01-12 PROCEDURE — 99024 POSTOP FOLLOW-UP VISIT: CPT | Performed by: PLASTIC SURGERY

## 2022-01-12 ASSESSMENT — PAIN SCALES - GENERAL: PAINLEVEL: MILD PAIN (3)

## 2022-01-12 ASSESSMENT — MIFFLIN-ST. JEOR: SCORE: 2577.34

## 2022-01-12 NOTE — PROGRESS NOTES
Service Date: 2022    PRESENTING COMPLAINT:  Postoperative visit status post left axillary hidradenitis suppurativa excision and left latissimus dorsi muscle flap with skin graft done 2021.    HISTORY OF PRESENTING COMPLAINT:  Mr. Sofia is 36 years old, here for regular followup visit, doing well, no major issues.    PHYSICAL EXAMINATION:  Vital signs are stable.  He is afebrile, in no obvious distress.  Everything is healing in well.    ASSESSMENT AND PLAN:  Based upon the above findings, a diagnosis of left axilla flap closure and posterior neck excision and closure was made.  Took out every other staple.  Advised continued dressing changes.  I also removed the JASWINDER drain.  I will see him back in a week's time to remove the rest of his staples and sutures.  All questions were answered.  He was happy with the visit.  All exam and discussion from beginning to end done in the presence of my nurse, Magi Tolliver.    SHELBY Ivy MD        D: 2022   T: 2022   MT: shona    Name:     JANEL SOFIA  MRN:      [id number]        Account:      [id number]   :      1985           Service Date: 2022       Document: [id number]

## 2022-01-12 NOTE — LETTER
2022       RE: Margarito Sofia  [address]  [place] [address]     Dear Colleague,    Thank you for referring your patient, Margarito Sofia, to the [facility] PLASTIC AND RECONSTRUCTIVE SURGERY CLINIC [place] at [facility]. Please see a copy of my visit note below.    Service Date: 2022    PRESENTING COMPLAINT:  Postoperative visit status post left axillary hidradenitis suppurativa excision and left latissimus dorsi muscle flap with skin graft done 2021.    HISTORY OF PRESENTING COMPLAINT:  Mr. Sofia is 36 years old, here for regular followup visit, doing well, no major issues.    PHYSICAL EXAMINATION:  Vital signs are stable.  He is afebrile, in no obvious distress.  Everything is healing in well.    ASSESSMENT AND PLAN:  Based upon the above findings, a diagnosis of left axilla flap closure and posterior neck excision and closure was made.  Took out every other staple.  Advised continued dressing changes.  I also removed the JASWINDER drain.  I will see him back in a week's time to remove the rest of his staples and sutures.  All questions were answered.  He was happy with the visit.  All exam and discussion from beginning to end done in the presence of my nurse, Magi Tolliver.    SHELBY Ivy MD        D: 2022   T: 2022   MT: shona    Name:     MARGARITO SOFIA  MRN:      [id number]        Account:      [id number]   :      1985           Service Date: 2022       Document: [id number]      Again, thank you for allowing me to participate in the care of your patient.      Sincerely,    SHELBY Ivy MD

## 2022-01-12 NOTE — NURSING NOTE
Chief Complaint   Patient presents with     JOLEEN Garay, is being seen today for a 2 week post-op DOS 12/27/21, experiencing burning sensation and tightness in the left arm, as reported by patient.       Vitals:    01/12/22 1004   BP: 133/86   BP Location: Right arm   Patient Position: Chair   Cuff Size: Adult Large   Pulse: 109   Temp: 98.4  F (36.9  C)   TempSrc: Oral   SpO2: 98%   Weight: (!) 161.9 kg (357 lb)   Height: 1.829 m (6')       Body mass index is 48.42 kg/m .      Xi Powers LPN

## 2022-01-18 ENCOUNTER — HOME INFUSION (PRE-WILLOW HOME INFUSION) (OUTPATIENT)
Dept: PHARMACY | Facility: CLINIC | Age: 37
End: 2022-01-18

## 2022-01-19 ENCOUNTER — OFFICE VISIT (OUTPATIENT)
Dept: PLASTIC SURGERY | Facility: CLINIC | Age: 37
End: 2022-01-19
Payer: COMMERCIAL

## 2022-01-19 VITALS
DIASTOLIC BLOOD PRESSURE: 98 MMHG | TEMPERATURE: 98.4 F | SYSTOLIC BLOOD PRESSURE: 141 MMHG | OXYGEN SATURATION: 96 % | HEART RATE: 102 BPM

## 2022-01-19 DIAGNOSIS — L73.2 HIDRADENITIS SUPPURATIVA: Primary | ICD-10-CM

## 2022-01-19 DIAGNOSIS — Z98.890 S/P FLAP GRAFT: ICD-10-CM

## 2022-01-19 PROCEDURE — 99024 POSTOP FOLLOW-UP VISIT: CPT | Performed by: PLASTIC SURGERY

## 2022-01-19 ASSESSMENT — PAIN SCALES - GENERAL: PAINLEVEL: MILD PAIN (2)

## 2022-01-19 NOTE — LETTER
1/19/2022       RE: Margarito Sofia  [address]  [place] [address]     Dear Colleague,    Thank you for referring your patient, Margarito Sofia, to the [facility] PLASTIC AND RECONSTRUCTIVE SURGERY CLINIC [place] at [facility]. Please see a copy of my visit note below.    PRESENTING COMPLAINT:  Postoperative visit status post hidradenitis suppurativa excision of the left axilla and posterior neck with a latissimus flap skin graft and closure done on 12/27/2021.    HISTORY OF PRESENTING COMPLAINT:  Mr. Sofia is 36 years old, 3 weeks out from surgery, doing extremely well, very happy with the results.  No issues.    PHYSICAL EXAMINATION:  Vital signs are stable.  He is afebrile, in no obvious distress.  Everything is healing in well.    ASSESSMENT AND PLAN:  Based on above findings, a diagnosis of hidradenitis suppurativa excision and flap closure was made.  Took out all the staples and sutures.  He will continue with dressing changes.  Allow everything to heal in.  Range of motion of the left shoulder was advocated.  I will see him back in 3 weeks.  All exam and discussion from beginning to end done in presence of my nurse, Magi Tolliver.          Again, thank you for allowing me to participate in the care of your patient.      Sincerely,    SHELBY Ivy MD

## 2022-01-19 NOTE — PROGRESS NOTES
PRESENTING COMPLAINT:  Postoperative visit status post hidradenitis suppurativa excision of the left axilla and posterior neck with a latissimus flap skin graft and closure done on 12/27/2021.    HISTORY OF PRESENTING COMPLAINT:  Mr. Sofia is 36 years old, 3 weeks out from surgery, doing extremely well, very happy with the results.  No issues.    PHYSICAL EXAMINATION:  Vital signs are stable.  He is afebrile, in no obvious distress.  Everything is healing in well.    ASSESSMENT AND PLAN:  Based on above findings, a diagnosis of hidradenitis suppurativa excision and flap closure was made.  Took out all the staples and sutures.  He will continue with dressing changes.  Allow everything to heal in.  Range of motion of the left shoulder was advocated.  I will see him back in 3 weeks.  All exam and discussion from beginning to end done in presence of my nurse, Magi Tolliver.

## 2022-01-19 NOTE — NURSING NOTE
Chief Complaint   Patient presents with     Surgical Followup     3 week post op DOS 12/27 hidranitis and flap       Vitals:    01/19/22 0834   BP: (!) 141/98   Pulse: 102   Temp: 98.4  F (36.9  C)   TempSrc: Oral   SpO2: 96%       There is no height or weight on file to calculate BMI.          NOMAN WHITE EMT

## 2022-01-19 NOTE — LETTER
Date:February 2, 2022      Patient was self referred, no letter generated. Do not send.        [facility] Health Information

## 2022-01-20 ENCOUNTER — HOME INFUSION (PRE-WILLOW HOME INFUSION) (OUTPATIENT)
Dept: PHARMACY | Facility: CLINIC | Age: 37
End: 2022-01-20

## 2022-01-21 ENCOUNTER — HOME INFUSION (PRE-WILLOW HOME INFUSION) (OUTPATIENT)
Dept: PHARMACY | Facility: CLINIC | Age: 37
End: 2022-01-21
Payer: COMMERCIAL

## 2022-01-21 ENCOUNTER — DOCUMENTATION ONLY (OUTPATIENT)
Dept: PHARMACY | Facility: CLINIC | Age: 37
End: 2022-01-21
Payer: COMMERCIAL

## 2022-01-21 NOTE — PROGRESS NOTES
Skilled Nurse visit in the [facility]fusion Suite to administer Remicade 800mg.  No recent elevated temperature, fever, chills, productive cough, coughing for 3 weeks or longer or hemoptysis, abnormal vital signs, night sweats, chest pain. No  decrease in your appetite, unexplained weight loss or fatigue.  No other new onset medical symptoms.  Current weight 350lb.  PIV placed RAC,  1 attempt/s.  Pre medicated with Acetaminophen, Diphenhydramine. Labs drawn none. Infusion completed without complication or reaction. Pt reports therapy is partially effective in managing symptoms related to therapy.  Sherrie Coleman RN  [facility] infusion  [email or web address]  [phone]

## 2022-02-08 NOTE — PROGRESS NOTES
This is a recent snapshot of the patient's [facility] medical record.  For current drug dose and complete information and questions, call [phone] or In Basket pool, fv home infusion ([id number])  CSN Number:  [id number]

## 2022-02-09 ENCOUNTER — TELEPHONE (OUTPATIENT)
Dept: PHARMACY | Facility: CLINIC | Age: 37
End: 2022-02-09
Payer: COMMERCIAL

## 2022-02-09 NOTE — TELEPHONE ENCOUNTER
[facility] PRIOR AUTHORIZATION REQUEST     Drug including DOSE: REMICADE 800 mg IV Every 4 weeks  J Code: [id number]  NDC: [address]-21  ICD 10 code: l73.2    Date(s) of Service: 02/09/2022    Insurance Name: medica  Insurance ID: [id number]    Provider: LISSETTE RIVERS MD  Provider NPI: [id number]      [facility]  NPI: [id number]

## 2022-02-10 NOTE — TELEPHONE ENCOUNTER
PA Initiation    Medication: REMICADE 800 mg IV Every 4 weeks  Insurance Company: [employer] [place] - Phone [phone] Fax [phone]  Pharmacy Filling the Rx: [facility]  Filling Pharmacy Phone:    Filling Pharmacy Fax:    Start Date: 2/10/2022    Central Prior Authorization Team   Phone: [phone]

## 2022-02-11 ENCOUNTER — HOSPITAL ENCOUNTER (OUTPATIENT)
Dept: NUTRITION | Facility: CLINIC | Age: 37
Discharge: HOME | End: 2022-02-11
Attending: DERMATOLOGY | Admitting: DERMATOLOGY
Payer: COMMERCIAL

## 2022-02-11 DIAGNOSIS — L73.2 HIDRADENITIS SUPPURATIVA: ICD-10-CM

## 2022-02-11 PROCEDURE — 97802 MEDICAL NUTRITION INDIV IN: CPT | Mod: TEL,95 | Performed by: DIETITIAN, REGISTERED

## 2022-02-11 NOTE — DISCHARGE INSTRUCTIONS
Tirso Garay,    It was nice talking with you today.  Here are the goals we determined:    GOALS   Place chips in bowl  No eating while shey   Read serving size on snack items     Tips for Weight Loss and Weight Management  [email or web address]    Please call if you have any questions prior to our next appointment.    Thanks,    Adrian Killian, RD, LD  [facility] Outpatient Dietitian  [phone] (office phone)

## 2022-02-11 NOTE — PROGRESS NOTES
Originating Location (pt. Location): Home    Distant Location (provider location):  [facility] NUTRITION SERVICES     Mode of Communication:  telephone     OUTPATIENT NUTRITION ASSESSMENT (TELEPHONE VISIT DUE TO COVID-19)  REASON FOR ASSESSMENT  Margarito Sofia referred by Dr. Denny for MNT related to Hidradenitis suppurativa [L73.2]  - Primary    Patient accompanied by self      ASSESSMENT   Nutrition History:  - Information obtained from patient.  - Patient is on a regular diet at home.  Patient states has struggled with his weight since he was a kid.  Patient is an emotional eater as will eat fast food when stressed.  Patient states mother would prepare foods for comfort for him when he was kid.  Patient often multitasks and eats.  Patient eats meal within 5 minutes.     Patient feels his biggest struggle with food is he is always hungry -eats whenever   Patient tries to eat healthy 1 liban Drinks to eat healthy 1 week per month   Gets off track by eating unhealthy food   Weekly grocery shopping   LightUp with scrambled eggs  Protein shake (premier protein shake) -feels hungry 1-2 hours after drinking   Pizza -entire pizza  (1 time per week) lunch and dinner   Eats quickly 2 minutes     Diet Recall:  Breakfast: breakfast sandwich on the way to work   9:00-10:00 crackers or granola bar  Lunch: may bring-spaghetti and meatballs or ham and cheese sandwich    Dinner: drive through 2 sandwich (chicken or cheeseburger) + fries and soft drink BK; chicken breasts + mixed salad or hot dogs (3) on buns  Snack: popcorn (single serving bags); candy bar   Beverages: 1-2 energy drinks 240 mg caffeine; 1/2 gallon water per day on work days; soda or Gatorade weekends   Dining out: every meal     Exercise: Patient with recent surgery and waiting for approval for to exercise.    NUTRITION FOCUSED PHYSICAL ASSESSMENT (NFPA) FOR DIAGNOSING MALNUTRITION  No due to telephone visit due to COVID-19 pandemic  "    LABS  Labs reviewed    MEDICATIONS  Medications reviewed    ANTHROPOMETRICS   Height: 6'0\"  Weight: 357 lbs   BMI (kg/m2): 48.4 kg/m2   Weight Status:  Obesity Grade III BMI >40  %IBW: 200%  Weight History:   Wt Readings from Last 10 Encounters:   01/12/22 (!) 161.9 kg (357 lb)   12/27/21 (!) 160.3 kg (353 lb 6.4 oz)   12/08/21 (!) 159.6 kg (351 lb 14.4 oz)   12/08/21 (!) 160.9 kg (354 lb 11.2 oz)   10/07/21 (!) 152 kg (335 lb)   09/26/19 135.5 kg (298 lb 11.2 oz)   09/12/19 131.5 kg (290 lb)     ASSESSED NUTRITION NEEDS  Estimated Energy Needs: 2246-0528 kcals/day (11-14 Kcal/Kg)  Justification:  (obese)  Estimated Protein Needs:  grams protein/day (1.2-1.5 g pro/Kg)  Justification:  (Repletion post surgery)  Estimated Fluid Needs: 5007-5598 mL/day (1 mL/Kcal)    ASSESSED MALNUTRITION STATUS  % Weight Loss:  None noted  % Intake:  No decreased intake noted  Subcutaneous Fat Loss:  None observed due to telephone visit due to COVID-19 pandemic   Loss of Muscle Mass:  None observed due to telephone visit due to COVID-19 pandemic   Fluid Retention:  None noted due to telephone visit due to COVID-19 pandemic     Malnutrition Diagnosis:  Patient does not meet two of the above criteria necessary for diagnosing malnutrition    DIAGNOSIS   Nutrition Diagnosis:  Obese, class III related to excess energy intake as evidenced by current BMI of 48.4 kg/m2     INTERVENTIONS   Nutrition Prescription   Recommend modified calorie intake and behavior modification for weight loss     IMPLEMENTATION   Assessed learning needs and learning preference  Teaching Method(s) used: Booklet / Handout  Explanation  Diet Education:  Provided education on weight loss diet   Nutrition Education (Content):   a)  Discussed portion sizes, label reading, eating pattern, exercise and behavior modification. Patient instructed on label reading using label from home.  Encouraged 60 grams carbohydrates per meal and 30 grams carbohydrates per " snack.  Recommend patient practice avoiding eating while shey.  Suggest gradual diet and behavior changes for long term weight loss success.  Supported patient with the challenge of weight loss.   b)  Provided Tips for Weight Management   Nutrition Education (Application):   a)  Discussed current eating plans and recommended alternative food choices   b)  Patient verbalizes understanding of diet by stating will avoid eating while shey   Expected patient engagement: good    GOALS (Patient determined)   Chips in bowl  No eating while shey   Read serving size on snack items     FOLLOW UP/MONITORING   Progress towards goals will be monitored and evaluated per protocol and Practice Guidelines  Patient to follow up in 4 weeks  RD name and number provided     Time Spent with Patient  35 minutes    Adrian Albarran, RD, LD  [facility] Outpatient Dietitian  [phone] (office phone)

## 2022-02-16 ENCOUNTER — OFFICE VISIT (OUTPATIENT)
Dept: PLASTIC SURGERY | Facility: CLINIC | Age: 37
End: 2022-02-16
Payer: COMMERCIAL

## 2022-02-16 ENCOUNTER — HOME INFUSION (PRE-WILLOW HOME INFUSION) (OUTPATIENT)
Dept: PHARMACY | Facility: CLINIC | Age: 37
End: 2022-02-16

## 2022-02-16 VITALS
WEIGHT: 315 LBS | DIASTOLIC BLOOD PRESSURE: 97 MMHG | SYSTOLIC BLOOD PRESSURE: 136 MMHG | HEART RATE: 104 BPM | OXYGEN SATURATION: 95 % | BODY MASS INDEX: 42.66 KG/M2 | TEMPERATURE: 98.6 F | HEIGHT: 72 IN

## 2022-02-16 DIAGNOSIS — Z98.890 S/P FLAP GRAFT: Primary | ICD-10-CM

## 2022-02-16 PROCEDURE — 99024 POSTOP FOLLOW-UP VISIT: CPT | Performed by: PLASTIC SURGERY

## 2022-02-16 ASSESSMENT — PAIN SCALES - GENERAL: PAINLEVEL: NO PAIN (0)

## 2022-02-16 NOTE — LETTER
2/16/2022       RE: Margarito Sofia  [address]  [place] [address]     Dear Colleague,    Thank you for referring your patient, Margarito Sofia, to the [facility] PLASTIC AND RECONSTRUCTIVE SURGERY CLINIC [place] at [facility]. Please see a copy of my visit note below.    POSTOPERATIVE VISIT NOTE    PRESENTING COMPLAINT:  Postoperative visit status post left axillary hidradenitis suppurativa excision with latissimus flap and skin graft closure on 12/27/2021.    HISTORY OF PRESENTING COMPLAINT:  Mr. Sofia is 36 years old.  He is 2 months out from surgery, almost completely healed.  Happy with results.  No issues.    PHYSICAL EXAMINATION:    VITAL SIGNS:  Stable.  He is afebrile, in no obvious distress.    SKIN:  Everything is almost completely healed.  Small scab on the most posterior aspect of the flap.    ASSESSMENT AND PLAN:  Based on the above findings, a diagnosis of left axillary hidradenitis suppurativa excision and flap closure with skin graft was made.  Healing well.  Advised aggressive moisturization.  No restrictions.  I will see him back in about 6-8 weeks.  We can plan the next excision and flap on the right side.  All questions were answered.  He was happy with the visit.  All exam and discussion done in the presence of my nurse, Magi Tolliver.          Again, thank you for allowing me to participate in the care of your patient.      Sincerely,    SHELBY Ivy MD

## 2022-02-16 NOTE — NURSING NOTE
Chief Complaint   Patient presents with     JOLEEN Garay, is being seen today for a 6 week post-op, no concerns at this time, as reported by patient.       Vitals:    02/16/22 0806   BP: (!) 136/97   BP Location: Left arm   Patient Position: Chair   Cuff Size: Adult Large   Pulse: 104   Temp: 98.6  F (37  C)   TempSrc: Oral   SpO2: 95%   Weight: (!) 162.4 kg (358 lb)   Height: 1.829 m (6')       Body mass index is 48.55 kg/m .      Xi Powers LPN

## 2022-02-16 NOTE — PROGRESS NOTES
POSTOPERATIVE VISIT NOTE    PRESENTING COMPLAINT:  Postoperative visit status post left axillary hidradenitis suppurativa excision with latissimus flap and skin graft closure on 12/27/2021.    HISTORY OF PRESENTING COMPLAINT:  Mr. Sofia is 36 years old.  He is 2 months out from surgery, almost completely healed.  Happy with results.  No issues.    PHYSICAL EXAMINATION:    VITAL SIGNS:  Stable.  He is afebrile, in no obvious distress.    SKIN:  Everything is almost completely healed.  Small scab on the most posterior aspect of the flap.    ASSESSMENT AND PLAN:  Based on the above findings, a diagnosis of left axillary hidradenitis suppurativa excision and flap closure with skin graft was made.  Healing well.  Advised aggressive moisturization.  No restrictions.  I will see him back in about 6-8 weeks.  We can plan the next excision and flap on the right side.  All questions were answered.  He was happy with the visit.  All exam and discussion done in the presence of my nurse, Magi Tolliver.

## 2022-02-16 NOTE — LETTER
Date:February 19, 2022      Patient was self referred, no letter generated. Do not send.        [facility] Health Information

## 2022-02-17 ENCOUNTER — HOME INFUSION (PRE-WILLOW HOME INFUSION) (OUTPATIENT)
Dept: PHARMACY | Facility: CLINIC | Age: 37
End: 2022-02-17
Payer: COMMERCIAL

## 2022-02-17 NOTE — PROGRESS NOTES
This is a recent snapshot of the patient's [facility] medical record.  For current drug dose and complete information and questions, call [phone] or In Basket pool, fv home infusion ([id number])  CSN Number:  [id number]

## 2022-02-18 ENCOUNTER — HOME INFUSION (PRE-WILLOW HOME INFUSION) (OUTPATIENT)
Dept: PHARMACY | Facility: CLINIC | Age: 37
End: 2022-02-18

## 2022-02-21 NOTE — PROGRESS NOTES
This is a recent snapshot of the patient's [facility] medical record.  For current drug dose and complete information and questions, call [phone] or In Basket pool, fv home infusion ([id number])  CSN Number:  [id number]

## 2022-03-03 ENCOUNTER — VIRTUAL VISIT (OUTPATIENT)
Dept: DERMATOLOGY | Facility: CLINIC | Age: 37
End: 2022-03-03
Payer: COMMERCIAL

## 2022-03-03 DIAGNOSIS — L73.2 HIDRADENITIS SUPPURATIVA: ICD-10-CM

## 2022-03-03 PROCEDURE — 99214 OFFICE O/P EST MOD 30 MIN: CPT | Mod: 95 | Performed by: DERMATOLOGY

## 2022-03-03 RX ORDER — SULFAMETHOXAZOLE/TRIMETHOPRIM 800-160 MG
1 TABLET ORAL 2 TIMES DAILY
Qty: 180 TABLET | Refills: 1 | Status: SHIPPED | OUTPATIENT
Start: 2022-03-03 | End: 2022-06-09

## 2022-03-03 RX ORDER — BENZOYL PEROXIDE 10 G/100G
SUSPENSION TOPICAL
Qty: 148 ML | Refills: 11 | Status: SHIPPED | OUTPATIENT
Start: 2022-03-03 | End: 2022-06-09

## 2022-03-03 NOTE — LETTER
Date:March 4, 2022      Provider requested that no letter be sent. Do not send.       [facility]

## 2022-03-03 NOTE — LETTER
3/3/2022       RE: Margarito Sofia  [address]  [facility] [address]     Dear Colleague,    Thank you for referring your patient, Margarito Sofia, to the [facility] DERMATOLOGY CLINIC [place] at [facility]. Please see a copy of my visit note below.    [facility] Dermatology Note  Encounter Date: Mar 3, 2022  Telephone ([phone]). Location of teledermatologist: [facility] DERMATOLOGY CLINIC [place]. Start time: 6:37pm. End time: 6:45pm    Dermatology Problem List:  FBsE 6/10/21   1. Hidradenitis suppurativa.  - current recommended tx: infliximab [id number] with 40mg methylpred, ILK to two sites 3/4/21, referred to plastics 10/7/2021   - Patient due for next TB Quantiferon test in August of 2021  - Labs done 8/2020  -past tx: has been on ABx for acne (incl Bactrim), clindamycin, rifampin, prednisone, bactrim, methotrexate 15mg (discontinued Summer 2020 due to elevated LFTs)  - Gaine da lot of weight recently will hold steroids  - Benzoyl peroxide wash  - If neck lesion still acting up next time will plan for excision     2. Acne vulgaris  -Has tried Accutane and a number of antibiotics     3. Hx of atypical nevus - ~2005 - removed in [place] -patient will bring us records at next visit    4. Hyperhydrosis  - Glycopyrrolate 1mg BID    SH:   ____________________________________________    Assessment & Plan:     # Hidradenitis suppurativaRashaun stage II.  - s/p surgery in rt axilla and neck, healing well. Minimal activity and draining in lefty axilla,. I see two small tunnels which are not draining.   - Continue bactrim DS  - continuing BP wash   - Continue infliximab  5mg/kg  - Following with nutrition     # Hx of atypical nevus   - FBSE in 3 months    Follow-up: 3 weeks in person    Staff:     Dennis Denny MD, FAAD, FACP     Departments of Internal Medicine and  Dermatology  [facility]  [phone]  ____________________________________________    CC: follow-up HS     HPI:  Mr. Margarito Sofia is a(n) 36 year old male who presents today as a return patient for HS. The patient was last seen in dermatology on 12/2/21 by myself at which time he was continued on infliximab and BP wash for treatment of HS. He was also started on bactrim for antibacterial coverage prior to his scheduled surgery on 12/27.    - No symptoms or history concerning for TB    No known TB exposure    No recent travel to TB endemic area   No chronic cough, hemoptysis, major weight changes or night sweats.   Not a healthcare worker   Not homeless and has not worked in a homeless shelter    HS Nurse Assessment    Nurse Assessment Data 10/7/2021 12/2/2021 3/3/2022   Over the past 30 days how many old lesions flared back up? - - 1   Over the past 30 days how many new lesions did you get? 2 recurring 2 new 1 0   Over the past week, how many dressing changes do you do each day? 1 0 1   Over the past week, has your wound drainage been: Mild Mild Mild   Rate your HS overall from 0 - 10 (0 = no disease, 10 = worst) over the past week:  4 5 3   Rate your pain score from 0 - 10 (0 = no disease, 10 = worst) for the most painful/symptomatic lesion in the past week:  2 4 1   Over the past week, how much has HS influenced your quality of life? slightly slightly not at all       Patient is otherwise feeling well, without additional skin concerns.    Physical Exam:  SKIN: Teledermatology photos were reviewed; image quality and interpretability: acceptable. Image date: 3/3/22  - Healing flap in rt axilla.No active lesions or drainage.  There is still swelling at the site,   - Healing scar on neck.   - 2 small draining tunnels in left axila  - No other lesions of concern on areas examined.     Medications:  Current Outpatient Medications   Medication     acetaminophen (TYLENOL) 325 MG tablet     benzoyl  peroxide (PANOXYL) 10 % external liquid     diazepam (VALIUM) 5 MG tablet     fluorometholone (FML LIQUIFILM) 0.1 % ophthalmic suspension     glycopyrrolate (ROBINUL) 1 MG tablet     inFLIXimab (REMICADE) 100 MG injection     ondansetron (ZOFRAN-ODT) 4 MG ODT tab     polyethylene glycol (MIRALAX) 17 GM/Dose powder     senna-docusate (SENOKOT-S/PERICOLACE) 8.6-50 MG tablet     sulfamethoxazole-trimethoprim (BACTRIM DS) 800-160 MG tablet     vitamin D3 (CHOLECALCIFEROL) 1.25 MG (41493 UT) capsule     Vitamin D3 (CHOLECALCIFEROL) 25 mcg (1000 units) tablet     Current Facility-Administered Medications   Medication     lidocaine 1% with EPINEPHrine 1:100,000 injection 3 mL     triamcinolone acetonide (KENALOG-10) injection 10 mg      Past Medical/Surgical History:   Patient Active Problem List   Diagnosis     Hidradenitis suppurativa     Morbid obesity (H)     Past Medical History:   Diagnosis Date     Hidradenitis suppurativa      Morbid obesity (H)        CC Dennis Denny MD  [address] [place] [address] on close of this encounter.      Again, thank you for allowing me to participate in the care of your patient.      Sincerely,    Dennis Denny MD

## 2022-03-03 NOTE — PROGRESS NOTES
[facility] Dermatology Note  Encounter Date: Mar 3, 2022  Telephone ([phone]). Location of teledermatologist: [facility] DERMATOLOGY CLINIC [place]. Start time: 6:37pm. End time: 6:45pm    Dermatology Problem List:  FBsE 6/10/21   1. Hidradenitis suppurativa.  - current recommended tx: infliximab [id number] with 40mg methylpred, ILK to two sites 3/4/21, referred to plastics 10/7/2021   - Patient due for next TB Quantiferon test in August of 2021  - Labs done 8/2020  -past tx: has been on ABx for acne (incl Bactrim), clindamycin, rifampin, prednisone, bactrim, methotrexate 15mg (discontinued Summer 2020 due to elevated LFTs)  - Gaine da lot of weight recently will hold steroids  - Benzoyl peroxide wash  - If neck lesion still acting up next time will plan for excision     2. Acne vulgaris  -Has tried Accutane and a number of antibiotics     3. Hx of atypical nevus - ~2005 - removed in [place] -patient will bring us records at next visit    4. Hyperhydrosis  - Glycopyrrolate 1mg BID    SH:   ____________________________________________    Assessment & Plan:     # Hidradenitis suppurativa, Rashaun stage II.  - s/p surgery in rt axilla and neck, healing well. Minimal activity and draining in lefty axilla,. I see two small tunnels which are not draining.   - Continue bactrim DS  - continuing BP wash   - Continue infliximab  5mg/kg  - Following with nutrition     # Hx of atypical nevus   - FBSE in 3 months    Follow-up: 3 weeks in person    Staff:     Dennis Denny MD, FAAD, FACP     Departments of Internal Medicine and Dermatology  [facility]  [phone]  ____________________________________________    CC: follow-up HS     HPI:  Mr. Margarito Sofia is a(n) 36 year old male who presents today as a return patient for HS. The patient was last seen in dermatology on 12/2/21 by myself at which time he was continued on infliximab and BP  wash for treatment of HS. He was also started on bactrim for antibacterial coverage prior to his scheduled surgery on 12/27.    - No symptoms or history concerning for TB    No known TB exposure    No recent travel to TB endemic area   No chronic cough, hemoptysis, major weight changes or night sweats.   Not a healthcare worker   Not homeless and has not worked in a homeless shelter    HS Nurse Assessment    Nurse Assessment Data 10/7/2021 12/2/2021 3/3/2022   Over the past 30 days how many old lesions flared back up? - - 1   Over the past 30 days how many new lesions did you get? 2 recurring 2 new 1 0   Over the past week, how many dressing changes do you do each day? 1 0 1   Over the past week, has your wound drainage been: Mild Mild Mild   Rate your HS overall from 0 - 10 (0 = no disease, 10 = worst) over the past week:  4 5 3   Rate your pain score from 0 - 10 (0 = no disease, 10 = worst) for the most painful/symptomatic lesion in the past week:  2 4 1   Over the past week, how much has HS influenced your quality of life? slightly slightly not at all       Patient is otherwise feeling well, without additional skin concerns.    Physical Exam:  SKIN: Teledermatology photos were reviewed; image quality and interpretability: acceptable. Image date: 3/3/22  - Healing flap in rt axilla.No active lesions or drainage.  There is still swelling at the site,   - Healing scar on neck.   - 2 small draining tunnels in left axila  - No other lesions of concern on areas examined.     Medications:  Current Outpatient Medications   Medication     acetaminophen (TYLENOL) 325 MG tablet     benzoyl peroxide (PANOXYL) 10 % external liquid     diazepam (VALIUM) 5 MG tablet     fluorometholone (FML LIQUIFILM) 0.1 % ophthalmic suspension     glycopyrrolate (ROBINUL) 1 MG tablet     inFLIXimab (REMICADE) 100 MG injection     ondansetron (ZOFRAN-ODT) 4 MG ODT tab     polyethylene glycol (MIRALAX) 17 GM/Dose powder     senna-docusate  (SENOKOT-S/PERICOLACE) 8.6-50 MG tablet     sulfamethoxazole-trimethoprim (BACTRIM DS) 800-160 MG tablet     vitamin D3 (CHOLECALCIFEROL) 1.25 MG (78018 UT) capsule     Vitamin D3 (CHOLECALCIFEROL) 25 mcg (1000 units) tablet     Current Facility-Administered Medications   Medication     lidocaine 1% with EPINEPHrine 1:100,000 injection 3 mL     triamcinolone acetonide (KENALOG-10) injection 10 mg      Past Medical/Surgical History:   Patient Active Problem List   Diagnosis     Hidradenitis suppurativa     Morbid obesity (H)     Past Medical History:   Diagnosis Date     Hidradenitis suppurativa      Morbid obesity (H)        CC Dennis Denny MD  [address] [place] [address] on close of this encounter.

## 2022-03-03 NOTE — NURSING NOTE
Dermatology Rooming Note    Margarito Sofia's goals for this visit include:   Chief Complaint   Patient presents with     Derm Problem     HS follow up, states that things have been going well     Stephanie Galloway CMA on 3/3/2022 at 3:22 PM

## 2022-03-16 ENCOUNTER — HOME INFUSION (PRE-WILLOW HOME INFUSION) (OUTPATIENT)
Dept: PHARMACY | Facility: CLINIC | Age: 37
End: 2022-03-16

## 2022-03-18 ENCOUNTER — HOME INFUSION (PRE-WILLOW HOME INFUSION) (OUTPATIENT)
Dept: PHARMACY | Facility: CLINIC | Age: 37
End: 2022-03-18

## 2022-03-18 ENCOUNTER — DOCUMENTATION ONLY (OUTPATIENT)
Dept: PHARMACY | Facility: CLINIC | Age: 37
End: 2022-03-18

## 2022-03-18 NOTE — PROGRESS NOTES
Skilled Nurse visit in the [facility] Infusion Suite to administer Remicade 800mg.  No recent elevated temperature, fever, chills, productive cough, coughing for 3 weeks or longer or hemoptysis, abnormal vital signs, night sweats, chest pain. No  decrease in your appetite, unexplained weight loss or fatigue.  No other new onset medical symptoms.  Current weight 345lb.  PIV placed right hand, 1 attempt/s.  Pre medicated with Acetaminophen and Diphenhydramine. Labs drawn none. Infusion completed with/without complication or reaction. Pt reports therapy is partially effective in managing symptoms related to therapy.  Sherrie Coleman RN  [facility]  [email or web address]  [phone]

## 2022-03-21 ENCOUNTER — HOSPITAL ENCOUNTER (OUTPATIENT)
Dept: NUTRITION | Facility: CLINIC | Age: 37
Discharge: HOME | End: 2022-03-21
Attending: DIETITIAN, REGISTERED | Admitting: DIETITIAN, REGISTERED
Payer: COMMERCIAL

## 2022-03-21 PROCEDURE — 97803 MED NUTRITION INDIV SUBSEQ: CPT | Mod: GT,95 | Performed by: DIETITIAN, REGISTERED

## 2022-03-21 NOTE — PROGRESS NOTES
Video-Visit Details    Type of service:  Video Visit    Video Start Time (time video started): 2:24    Video End Time (time video stopped): 2:45    Originating Location (pt. Location): Home    Distant Location (provider location):  [facility] NUTRITION SERVICES     Mode of Communication:  Video Conference via [facility]    OUTPATIENT NUTRITION ASSESSMENT (TELEPHONE VISIT DUE TO COVID-19)  REASON FOR ASSESSMENT  Margarito Sofia referred by Dr. Denny for MNT related to Hidradenitis suppurativa [L73.2]  - Primary    Patient accompanied by self       ASSESSMENT   Nutrition History:  - Information obtained from patient.  - Patient is on a regular diet at home.  Patient states has struggled with his weight since he was a kid.  Patient is an emotional eater as will eat fast food when stressed.  Patient states mother would prepare foods for comfort for him when he was kid.  Patient often multitasks and eats.  Patient eats meal within 5 minutes.      Patient feels his biggest struggle with food is he is always hungry -eats whenever   Patient tries to eat healthy 1 day Drinks to eat healthy 1 week per month   Gets off track by eating unhealthy food   Weekly grocery shopping   Lookery with scrambled eggs  Protein shake (premier protein shake) -feels hungry 1-2 hours after drinking   Pizza -entire pizza  (1 time per week) lunch and dinner   Eats quickly 2 minutes     3/21/2022 Patient states was able to maintain his weight this month which he is pleased with.  Patient wants to begin losing weight.  Patient working long hours.      Egg sandwich (breakfast) ratliff, sausage and cheese (BK) croissant OWYM (170 kcals + 20 grams protein)    2 slices pizza  Hot dogs (2) with buns + chips   Water and diet soda and energy drinks or juice (grape juice)     Keeping snacks under 30 grams carbohydrates; keeping food not in arms reach when working or shey      Diet Recall:  Breakfast: breakfast sandwich on the way to  "work   9:00-10:00 crackers or granola bar  Lunch: may bring-spaghetti and meatballs or ham and cheese sandwich    Dinner: drive through 2 sandwich (chicken or cheeseburger) + fries and soft drink BK; chicken breasts + mixed salad or hot dogs (3) on buns  Snack: popcorn (single serving bags); candy bar   Beverages: 1-2 energy drinks 240 mg caffeine; 1/2 gallon water per day on work days; soda or Gatorade weekends   Dining out: every meal      Exercise: Patient with recent surgery and waiting for approval for to exercise.     NUTRITION FOCUSED PHYSICAL ASSESSMENT (NFPA) FOR DIAGNOSING MALNUTRITION  No due to telephone visit due to COVID-19 pandemic      LABS  Labs reviewed     MEDICATIONS  Medications reviewed     ANTHROPOMETRICS   Height: 6'0\"  Weight: 357 lbs   BMI (kg/m2): 48.4 kg/m2   Weight Status:  Obesity Grade III BMI >40  %IBW: 200%  Weight History:   Wt Readings from Last 10 Encounters:   02/16/22 (!) 162.4 kg (358 lb)   01/12/22 (!) 161.9 kg (357 lb)   12/27/21 (!) 160.3 kg (353 lb 6.4 oz)   12/08/21 (!) 159.6 kg (351 lb 14.4 oz)   12/08/21 (!) 160.9 kg (354 lb 11.2 oz)   10/07/21 (!) 152 kg (335 lb)   09/26/19 135.5 kg (298 lb 11.2 oz)   09/12/19 131.5 kg (290 lb)      ASSESSED NUTRITION NEEDS  Estimated Energy Needs: 4335-4572 kcals/day (11-14 Kcal/Kg)  Justification:  (obese)  Estimated Protein Needs:  grams protein/day (1.2-1.5 g pro/Kg)  Justification:  (Repletion post surgery)  Estimated Fluid Needs: 8373-1839 mL/day (1 mL/Kcal)     ASSESSED MALNUTRITION STATUS  % Weight Loss:  None noted  % Intake:  No decreased intake noted  Subcutaneous Fat Loss:  None observed due to telephone visit due to COVID-19 pandemic   Loss of Muscle Mass:  None observed due to telephone visit due to COVID-19 pandemic   Fluid Retention:  None noted due to telephone visit due to COVID-19 pandemic      Malnutrition Diagnosis:  Patient does not meet two of the above criteria necessary for diagnosing " malnutrition    EVALUATION/PROGRESS TOWARDS GOALS  Previous nutrition goals:  Chips in bowl-met  No eating while shey-improving   Read serving size on snack items-improving    Previous nutrition diagnosis: Obese, class III related to excess energy intake as evidenced by current BMI of 48.4 kg/m2 -no change     Current nutrition diagnosis: Obese, class III related to excess energy intake as evidenced by current BMI of 48.4 kg/m2         INTERVENTIONS   Nutrition Prescription   Recommend modified calorie intake and behavior modification for weight loss      IMPLEMENTATION   Assessed learning needs and learning preference  Teaching Method(s) used: Booklet / Handout  Explanation  Diet Education:  Provided education on weight loss diet   Nutrition Education (Content):              a)  Discussed progress towards goals.  Review current intake.  Congratulated patient for reading labels and reducing portion sizes.  Further discussion on portion sizes, label reading, eating pattern, exercise and behavior modification.  Encouraged 60 grams carbohydrates per meal and 30 grams carbohydrates per snack.  Recommend patient practice avoiding eating while shey.  Suggest gradual diet and behavior changes for long term weight loss success.  Supported patient with the challenge of weight loss.  Nutrition Education (Application):              a)  Discussed current eating plans and recommended lower calorie breakfast options.               b)  Patient verbalizes understanding of diet by stating will choose different breakfast options.   Expected patient engagement: good     GOALS   Put chips in bowl  Avoid eating while shey   Read serving size on snack items   Aim for 500 calorie breakfast (breakfast sandwich and protein shake)      FOLLOW UP/MONITORING   Progress towards goals will be monitored and evaluated per protocol and Practice Guidelines  Patient to follow up in 5 weeks  RD name and number provided      Time Spent with  Patient  21 minutes     Adrian Albarran, RD, LD  [facility] Outpatient Dietitian  [phone] (office phone)

## 2022-03-30 ENCOUNTER — OFFICE VISIT (OUTPATIENT)
Dept: PLASTIC SURGERY | Facility: CLINIC | Age: 37
End: 2022-03-30
Payer: COMMERCIAL

## 2022-03-30 VITALS
OXYGEN SATURATION: 94 % | HEIGHT: 72 IN | HEART RATE: 84 BPM | DIASTOLIC BLOOD PRESSURE: 103 MMHG | WEIGHT: 315 LBS | SYSTOLIC BLOOD PRESSURE: 142 MMHG | BODY MASS INDEX: 42.66 KG/M2

## 2022-03-30 DIAGNOSIS — Z98.890 S/P FLAP GRAFT: Primary | ICD-10-CM

## 2022-03-30 PROCEDURE — 99024 POSTOP FOLLOW-UP VISIT: CPT | Performed by: PLASTIC SURGERY

## 2022-03-30 ASSESSMENT — PAIN SCALES - GENERAL: PAINLEVEL: MILD PAIN (3)

## 2022-03-30 NOTE — PROGRESS NOTES
This is a recent snapshot of the patient's [facility] medical record.  For current drug dose and complete information and questions, call [phone] or In Basket pool, [facility] home infusion ([id number])  CSN Number:  [id number]

## 2022-03-30 NOTE — PROGRESS NOTES
PRESENTING COMPLAINT:  Postoperative visit status post left axillary hidradenitis suppurativa excision with latissimus flap and skin graft closure on 12/27/2021.     HISTORY OF PRESENTING COMPLAINT:  Mr. Sofia is 36 years old.  He is 3 months out from surgery, completely healed.  Happy with results.  No issues.     PHYSICAL EXAMINATION:    VITAL SIGNS:  Stable.  He is afebrile, in no obvious distress.    SKIN:  Everything is completely healed.     ASSESSMENT AND PLAN:  Based on the above findings, a diagnosis of left axillary hidradenitis suppurativa excision and flap closure with skin graft was made.  Healed.  Advised aggressive moisturization.  No restrictions.  I will see him back when he wants to pproceed with the left axilla. He'll lt us know he said.  He was happy with the visit.  All exam and discussion done in the presence of my nurse, Magi Tolliver.

## 2022-03-30 NOTE — LETTER
Date:April 8, 2022      Provider requested that no letter be sent. Do not send.       [facility]

## 2022-03-30 NOTE — NURSING NOTE
Chief Complaint   Patient presents with     JOLEEN Garay, is being seen today for a f/u DOS 12/27/21 neck and axillary HS, front left abd muscles are experiencing pain, low back graft site 3/10 pain, as reported by patient.       Vitals:    03/30/22 0834   BP: (!) 142/103   BP Location: Left arm   Patient Position: Chair   Cuff Size: Adult Large   Pulse: 84   SpO2: 94%   Weight: (!) 164.7 kg (363 lb)   Height: 1.829 m (6')       Body mass index is 49.23 kg/m .      Xi Powers LPN

## 2022-03-30 NOTE — LETTER
3/30/2022       RE: Margarito Sofia  [address]  [place] [address]     Dear Colleague,    Thank you for referring your patient, Margarito Sofia, to the [facility] PLASTIC AND RECONSTRUCTIVE SURGERY CLINIC [place] at [facility]. Please see a copy of my visit note below.    PRESENTING COMPLAINT:  Postoperative visit status post left axillary hidradenitis suppurativa excision with latissimus flap and skin graft closure on 12/27/2021.     HISTORY OF PRESENTING COMPLAINT:  Mr. Sofia is 36 years old.  He is 3 months out from surgery, completely healed.  Happy with results.  No issues.     PHYSICAL EXAMINATION:    VITAL SIGNS:  Stable.  He is afebrile, in no obvious distress.    SKIN:  Everything is completely healed.     ASSESSMENT AND PLAN:  Based on the above findings, a diagnosis of left axillary hidradenitis suppurativa excision and flap closure with skin graft was made.  Healed.  Advised aggressive moisturization.  No restrictions.  I will see him back when he wants to pproceed with the left axilla. He'll lt us know he said.  He was happy with the visit.  All exam and discussion done in the presence of my nurse, Magi Tolliver.      Again, thank you for allowing me to participate in the care of your patient.      Sincerely,    SHELBY Ivy MD

## 2022-04-01 ENCOUNTER — TELEPHONE (OUTPATIENT)
Dept: DERMATOLOGY | Facility: CLINIC | Age: 37
End: 2022-04-01
Payer: COMMERCIAL

## 2022-04-13 ENCOUNTER — HOME INFUSION (PRE-WILLOW HOME INFUSION) (OUTPATIENT)
Dept: PHARMACY | Facility: CLINIC | Age: 37
End: 2022-04-13

## 2022-04-13 NOTE — PROGRESS NOTES
This is a recent snapshot of the patient's [facility] medical record.  For current drug dose and complete information and questions, call [phone] or In Basket pool, fv home infusion ([id number])  CSN Number:  [id number]

## 2022-04-15 ENCOUNTER — DOCUMENTATION ONLY (OUTPATIENT)
Dept: PHARMACY | Facility: CLINIC | Age: 37
End: 2022-04-15
Payer: COMMERCIAL

## 2022-04-15 ENCOUNTER — HOME INFUSION (PRE-WILLOW HOME INFUSION) (OUTPATIENT)
Dept: PHARMACY | Facility: CLINIC | Age: 37
End: 2022-04-15
Payer: COMMERCIAL

## 2022-04-15 NOTE — PROGRESS NOTES
Skilled Nurse visit in the [facility] Infusion Site to administer Remicade 800mg IV over 1 hour every 4 weeks.  No recent elevated temperature, fever, chills, productive cough, coughing for 3 weeks or longer or hemoptysis, abnormal vital signs, night sweats, chest pain. No  decrease in your appetite, unexplained weight loss or fatigue.  No other new onset medical symptoms.  Current weight 359lbs.  Peripheral IVright Wrist, 2 attempts Pre medicated with 650mg po tylenol and 50mg po benadryl. Infusion completed without complication or reaction. Pt reports therapy iseffective in managing symptoms related to therapy.    Madeline Galvez, RN, MSN, OCN  [facility]

## 2022-04-18 NOTE — PROGRESS NOTES
This is a recent snapshot of the patient's [facility] medical record.  For current drug dose and complete information and questions, call [phone] or In Basket pool, fv home infusion ([id number])  CSN Number:  [id number]

## 2022-04-19 NOTE — PROGRESS NOTES
This is a recent snapshot of the patient's [facility] medical record.  For current drug dose and complete information and questions, call [phone] or In Basket pool, fv home infusion ([id number])  CSN Number:  [id number]

## 2022-04-22 NOTE — PROGRESS NOTES
This is a recent snapshot of the patient's [facility] medical record.  For current drug dose and complete information and questions, call [phone] or In Basket pool, fv home infusion ([id number])  CSN Number:  [id number]

## 2022-05-02 NOTE — PROGRESS NOTES
This is a recent snapshot of the patient's [facility] medical record.  For current drug dose and complete information and questions, call [phone] or In [facility] pool, fv home infusion ([id number])  CSN Number:  [id number]     H Plasty Text: Given the location of the defect, shape of the defect and the proximity to free margins a H-plasty was deemed most appropriate for repair.  Using a sterile surgical marker, the appropriate advancement arms of the H-plasty were drawn incorporating the defect and placing the expected incisions within the relaxed skin tension lines where possible. The area thus outlined was incised deep to adipose tissue with a #15 scalpel blade. The skin margins were undermined to an appropriate distance in all directions utilizing iris scissors.  The opposing advancement arms were then advanced into place in opposite direction and anchored with interrupted buried subcutaneous sutures.

## 2022-05-06 ENCOUNTER — HOSPITAL ENCOUNTER (OUTPATIENT)
Dept: NUTRITION | Facility: CLINIC | Age: 37
Discharge: HOME | End: 2022-05-06
Attending: DIETITIAN, REGISTERED | Admitting: DIETITIAN, REGISTERED
Payer: COMMERCIAL

## 2022-05-06 VITALS — WEIGHT: 315 LBS | BODY MASS INDEX: 48.42 KG/M2

## 2022-05-06 PROCEDURE — 97803 MED NUTRITION INDIV SUBSEQ: CPT | Mod: GT,95 | Performed by: DIETITIAN, REGISTERED

## 2022-05-06 NOTE — PROGRESS NOTES
Video-Visit Details    Type of service:  Video Visit    Video Start Time (time video started): 3:30     Video End Time (time video stopped): 3:57    Originating Location (pt. Location): Home    Distant Location (provider location):  [facility] NUTRITION SERVICES     Mode of Communication:  Video Conference via [facility]    OUTPATIENT NUTRITION ASSESSMENT (VIDEO VISIT DUE TO COVID-19)  REASON FOR ASSESSMENT  Margarito Sofia referred by Dr. Denny for MNT related to Hidradenitis suppurativa [L73.2]  - Primary    Patient accompanied by self       ASSESSMENT   Nutrition History:  - Information obtained from patient.  - Patient is on a regular diet at home.  Patient states has struggled with his weight since he was a kid.  Patient is an emotional eater as will eat fast food when stressed.  Patient states mother would prepare foods for comfort for him when he was kid.  Patient often multitasks and eats.  Patient eats meal within 5 minutes.      Patient feels his biggest struggle with food is he is always hungry -eats whenever   Patient tries to eat healthy 1 day and then finds it difficult to stay on track   Gets off track by eating unhealthy food   Weekly grocery shopping   "[employer] with scrambled eggs  Protein shake (premier protein shake) -feels hungry 1-2 hours after drinking   Pizza -entire pizza  (1 time per week) lunch and dinner   Eats quickly 2 minutes      3/21/2022 Patient states was able to maintain his weight this month which he is pleased with.  Patient wants to begin losing weight.  Patient working long hours.       Egg sandwich (breakfast) ratliff, sausage and cheese (BK) croissant OWYM (170 kcals + 20 grams protein)    2 slices pizza  Hot dogs (2) with buns + chips   Water and diet soda and energy drinks or juice (grape juice)      Keeping snacks under 30 grams carbohydrates; keeping food not in arms reach when working or shey     5/6/ egg mcmufifn with apple + protein shake (9:00  "AM)    Paying attention to what orders at lunch-[employer] -makes shredded chicken and brown rice, chili, salad  Dinner: 2-3 slices pizza with side salad; chicken wrap; spaghetti with shredded chicken   357 lbs   1 energy drink per day + water   Occasional BK   5-6 hours sleep      Diet Recall:  Breakfast: breakfast sandwich on the way to work   9:00-10:00 crackers or granola bar  Lunch: may bring-spaghetti and meatballs or ham and cheese sandwich    Dinner: drive through 2 sandwich (chicken or cheeseburger) + fries and soft drink BK; chicken breasts + mixed salad or hot dogs (3) on buns  Snack: popcorn (single serving bags); candy bar   Beverages: 1-2 energy drinks 240 mg caffeine; 1/2 gallon water per day on work days; soda or Gatorade weekends   Dining out: every meal      Exercise: Patient with recent surgery and waiting for approval for to exercise. Riding stationary bike 2 times per week for 30 minutes.  Planning to join gym.      NUTRITION FOCUSED PHYSICAL ASSESSMENT (NFPA) FOR DIAGNOSING MALNUTRITION  Yes-visual only    No nutrition related physical findings      LABS  Labs reviewed     MEDICATIONS  Medications reviewed     ANTHROPOMETRICS   Height: 6'0\"  Weight: 357 lbs   BMI (kg/m2): 48.4 kg/m2   Weight Status:  Obesity Grade III BMI >40  %IBW: 200%  Weight History:   Wt Readings from Last 10 Encounters:   05/06/22 (!) 161.9 kg (357 lb)   03/30/22 (!) 164.7 kg (363 lb)   02/16/22 (!) 162.4 kg (358 lb)   01/12/22 (!) 161.9 kg (357 lb)   12/27/21 (!) 160.3 kg (353 lb 6.4 oz)   12/08/21 (!) 159.6 kg (351 lb 14.4 oz)   12/08/21 (!) 160.9 kg (354 lb 11.2 oz)   10/07/21 (!) 152 kg (335 lb)   09/26/19 135.5 kg (298 lb 11.2 oz)   09/12/19 131.5 kg (290 lb)      ASSESSED NUTRITION NEEDS  Estimated Energy Needs: 2483-4144 kcals/day (11-14 Kcal/Kg)  Justification:  (obese)  Estimated Protein Needs:  grams protein/day (1.2-1.5 g pro/Kg)  Justification:  (Repletion post surgery)  Estimated Fluid " Needs: 4935-7482 mL/day (1 mL/Kcal)     ASSESSED MALNUTRITION STATUS  % Weight Loss:  None noted  % Intake:  No decreased intake noted  Subcutaneous Fat Loss:  None observed   Loss of Muscle Mass:  None observed   Fluid Retention:  None noted      Malnutrition Diagnosis:  Patient does not meet two of the above criteria necessary for diagnosing malnutrition     EVALUATION/PROGRESS TOWARDS GOALS  Previous nutrition goals:  Put chips in bowl-not met   Avoid eating while shey-improving   Read serving size on snack items-not met   Aim for 500 calorie breakfast (breakfast sandwich and protein shake) -improving     Previous nutrition diagnosis: Obese, class III related to excess energy intake as evidenced by current BMI of 48.4 kg/m2 -no change      Current nutrition diagnosis: Obese, class III related to excess energy intake as evidenced by current BMI of 48.4 kg/m2      INTERVENTIONS   Nutrition Prescription   Recommend modified calorie intake and behavior modification for weight loss      IMPLEMENTATION   Assessed learning needs and learning preference  Teaching Method(s) used: Booklet / Handout  Explanation  Diet Education:  Provided education on weight loss diet   Nutrition Education (Content):              a)  Discussed progress towards goals.  Review current intake.  Congratulated patient for making meals and reducing portion sizes.  Further discussion on portion sizes, label reading, eating pattern, exercise and behavior modification. Encouraged 60 grams carbohydrates per meal and 30 grams carbohydrates per snack.  Recommend patient practice avoiding eating while shey.  Suggest gradual diet and behavior changes for long term weight loss success.  Supported patient with the challenge of weight loss.  Nutrition Education (Application):              a)  Discussed current eating behaviors with focus on slowing down when eating.                b)  Patient verbalizes understanding of diet by stating will set timer on  phone.   Expected patient engagement: good     GOALS   Exercise 4-5 days per week   Set timer on phone to slow down   Place food in backseat if orders fast food       FOLLOW UP/MONITORING   Progress towards goals will be monitored and evaluated per protocol and Practice Guidelines  Patient to follow up in 4 weeks  RD name and number provided      Time Spent with Patient  27 minutes     Adrian Albarran, RD, LD  [facility] Outpatient Dietitian  [phone] (office phone)

## 2022-05-06 NOTE — DISCHARGE INSTRUCTIONS
GOALS   Exercise 4-5 days per week (start slow) (join gym)  Set timer for 15 seconds on phone to slow down   Place food in backseat if orders fast food

## 2022-05-10 ENCOUNTER — HOME INFUSION (PRE-WILLOW HOME INFUSION) (OUTPATIENT)
Dept: PHARMACY | Facility: CLINIC | Age: 37
End: 2022-05-10

## 2022-05-13 ENCOUNTER — DOCUMENTATION ONLY (OUTPATIENT)
Dept: PHARMACY | Facility: CLINIC | Age: 37
End: 2022-05-13
Payer: COMMERCIAL

## 2022-05-13 NOTE — PROGRESS NOTES
Skilled Nurse visit in the [facility] to administer  Rapid Remicade 800mg  Every 4 weeks. No recent elevated temperature, fever, chills, productive cough, coughing for 3 weeks or longer or hemoptysis, abnormal vital signs, night sweats, chest pain. No  decrease in your appetite, unexplained weight loss or fatigue.  No other new onset medical symptoms.  Current weight 266bl.  Peripheral IVleft Lower Forearm, 1 attempt premedicated Acetaminophen 650mg and Diphenhydramine 50 mg po .no. Labs drawn 1515. Infusion completed without complication or reaction. Pt reports therapy iseffective in managing symptoms related to therapy.        Vidya downing RN-BSN  [facility] infusion   Cell [phone]  [email or web address]

## 2022-05-21 ENCOUNTER — HEALTH MAINTENANCE LETTER (OUTPATIENT)
Age: 37
End: 2022-05-21

## 2022-06-02 ENCOUNTER — OFFICE VISIT (OUTPATIENT)
Dept: DERMATOLOGY | Facility: CLINIC | Age: 37
End: 2022-06-02
Payer: COMMERCIAL

## 2022-06-02 DIAGNOSIS — L73.2 HIDRADENITIS SUPPURATIVA: Primary | ICD-10-CM

## 2022-06-02 DIAGNOSIS — L30.8 PSORIASIFORM ERUPTION: ICD-10-CM

## 2022-06-02 DIAGNOSIS — L30.8 PSORIASIFORM DERMATITIS: ICD-10-CM

## 2022-06-02 PROCEDURE — 99214 OFFICE O/P EST MOD 30 MIN: CPT | Performed by: DERMATOLOGY

## 2022-06-02 RX ORDER — DESONIDE 0.5 MG/G
CREAM TOPICAL
Qty: 60 G | Refills: 1 | Status: SHIPPED | OUTPATIENT
Start: 2022-06-02 | End: 2023-01-20

## 2022-06-02 NOTE — PROGRESS NOTES
[facility] Dermatology Note  Encounter Date: Jun 2, 2022  Office Visit     Dermatology Problem List:  FBSE 6/2/22  1. Hidradenitis suppurativa.  - current recommended tx: infliximab y3giang with 40mg methylpred, ILK to two sites 3/4/21, referred to plastics 10/7/2021   - Patient due for next TB Quantiferon test in August of 2021  - Labs done 8/2020  -past tx: has been on ABx for acne (incl Bactrim), clindamycin, rifampin, prednisone, bactrim, methotrexate 15mg (discontinued Summer 2020 due to elevated LFTs)  - Gaine da lot of weight recently will hold steroids  - Benzoyl peroxide wash  - If neck lesion still acting up next time will plan for excision     2. Acne vulgaris  -Has tried Accutane and a number of antibiotics      3. Hx of atypical nevus - ~2005 - removed in [place] -patient will bring us records at next visit     4. Hyperhydrosis  - Glycopyrrolate 1mg BID    SH:     ____________________________________________    Assessment & Plan:  # Hidradenitis suppurativa, Rashaun stage II.  - s/p surgery in rt axilla and neck, healing well. Some activity in left axilla. Patient will follow-up with Dr. Ivy for this and to discuss excision in R axilla.   - Continue bactrim DS. Refills provided.   - Continue BP wash   - Continue infliximab  5mg/kg  - Following with nutrition  - Referred to PCP to discuss vaccines: recommended shingles, pneumonia, COVID booster     # Psoriasiform eruption, b/l inguinal folds  - Start applying desonide cream twice daily to the affected areas     # Hx of atypical nevus   - Encouraged him to bring in prior records    Follow-up: 6 month(s) in-person, or earlier for new or changing lesions    Staff and Scribe:     Scribe Disclosure:  Brenda REDMAN, am serving as a scribe to document services personally performed by Dennis Denny MD based on data collection and the provider's statements to me.     Provider Disclosure:   The  documentation recorded by the scribe accurately reflects the services I personally performed and the decisions made by me.    Dennis Denny MD, FAAD    Departments of Internal Medicine and Dermatology  [facility]  [phone]    ____________________________________________    CC: Derm Problem (Margarito is following up for HS post surgical.)    HPI:  Mr. Margarito Sofia is a(n) 36 year old male who presents today as a return patient for HS follow-up. Last seen by myself virtually on 3/3/22 at which point he was continued on bactrim DS, BP wash, and infliximab 5 mg/kg for treatment of HS.     The patient recently underwent surgical excision of two lesion on the neck and L axilla in 12/21. Since then, he has noticed flaring of the L axilla. It will fill with fluid, become hard, then drain. He rates this as a 6/10 in pain at its worst. He also notes a flare on his back. The 2 tunnels present on his R axilla are unchanged. He is currently on infliximab infusion, BP wash, and Bactrim (but recently lost this and needs refills). He does feel his diet has improved. Patient is otherwise feeling well, without additional skin concerns.    HS Nurse Assessment    Nurse Assessment Data 12/2/2021 3/3/2022 6/2/2022   Over the past 30 days how many old lesions flared back up? - 1 2   Over the past 30 days how many new lesions did you get? 1 0 0   Over the past week, how many dressing changes do you do each day? 0 1 0   Over the past week, has your wound drainage been: Mild Mild Moderate   Rate your HS overall from 0 - 10 (0 = no disease, 10 = worst) over the past week:  5 3 4-5   Rate your pain score from 0 - 10 (0 = no disease, 10 = worst) for the most painful/symptomatic lesion in the past week:  4 1 4   Over the past week, how much has HS influenced your quality of life? slightly not at all not at all       Labs Reviewed:  N/A    Physical Exam:  Vitals: There were no vitals taken for this  visit.  SKIN: Full skin, which includes the head/face, both arms, chest, back, abdomen,both legs, genitalia and/or groin buttocks, digits and/or nails, was examined.  - Fairly demarcated pink plaques in bilateral groin.  - See HS exam below  - No other lesions of concern on areas examined.     HS Data  HS Exam Data 7/7/2021 10/7/2021 6/2/2022   LC Type LC1 LC1 -   Clinical Subtypes Regular type Regular type -   Acne? Yes Yes -   Acne Comments - - -   Dissecting Cellulitis? No No -   Total Rodney Stage II II -   Total Inflammatory Nodules 4 9 3   Total Abcesses 0 0 0   Total Draining Tunnels 4 4 1   Total Abscess and Nodule Count 4 9 3   IHS4 Score  20 25 7   Total  HASI Score 57 47 21   HS-PGA 3 5 -         Medications:  Current Outpatient Medications   Medication     acetaminophen (TYLENOL) 325 MG tablet     inFLIXimab (REMICADE) 100 MG injection     benzoyl peroxide (PANOXYL) 10 % external liquid     diazepam (VALIUM) 5 MG tablet     fluorometholone (FML LIQUIFILM) 0.1 % ophthalmic suspension     glycopyrrolate (ROBINUL) 1 MG tablet     ondansetron (ZOFRAN-ODT) 4 MG ODT tab     polyethylene glycol (MIRALAX) 17 GM/Dose powder     senna-docusate (SENOKOT-S/PERICOLACE) 8.6-50 MG tablet     sulfamethoxazole-trimethoprim (BACTRIM DS) 800-160 MG tablet     vitamin D3 (CHOLECALCIFEROL) 1.25 MG (86795 UT) capsule     Vitamin D3 (CHOLECALCIFEROL) 25 mcg (1000 units) tablet     Current Facility-Administered Medications   Medication     lidocaine 1% with EPINEPHrine 1:100,000 injection 3 mL     triamcinolone acetonide (KENALOG-10) injection 10 mg      Past Medical History:   Patient Active Problem List   Diagnosis     Hidradenitis suppurativa     Morbid obesity (H)     Past Medical History:   Diagnosis Date     Hidradenitis suppurativa      Morbid obesity (H)         CC Referred Self, MD  No address on file on close of this encounter.

## 2022-06-02 NOTE — NURSING NOTE
Dermatology Rooming Note    Margarito Sofia's goals for this visit include:   Chief Complaint   Patient presents with     Derm Problem     Margarito is following up for HS post surgical.

## 2022-06-02 NOTE — LETTER
Date:Dayan 10, 2022      Patient was self referred, no letter generated. Do not send.        [facility] Health Information

## 2022-06-06 ENCOUNTER — HOSPITAL ENCOUNTER (OUTPATIENT)
Dept: NUTRITION | Facility: CLINIC | Age: 37
Discharge: HOME | End: 2022-06-06
Attending: DIETITIAN, REGISTERED | Admitting: DIETITIAN, REGISTERED
Payer: COMMERCIAL

## 2022-06-06 VITALS — BODY MASS INDEX: 47.74 KG/M2 | WEIGHT: 315 LBS

## 2022-06-06 PROCEDURE — 97803 MED NUTRITION INDIV SUBSEQ: CPT | Mod: GT,95 | Performed by: DIETITIAN, REGISTERED

## 2022-06-06 NOTE — DISCHARGE INSTRUCTIONS
Tirso Garay,    Great work on making diet and behavior changes.  Here are the goals we discussed:     GOALS   Track intake   Reduce carbohydrate portions by 25% at meals   Put leftovers in the refrigerator before eating meal     Daily Food and Exercise Log  [email or web address]     Please let me know if you have any questions prior to your next appointment.    Thanks,    Adrian Killian, RD, LD  [facility] Outpatient Dietitian  [phone] (office phone)

## 2022-06-06 NOTE — PROGRESS NOTES
Video-Visit Details    Type of service:  Video Visit    Video Start Time (time video started): 4:00    Video End Time (time video stopped): 4:21    Originating Location (pt. Location): Home    Distant Location (provider location):  [facility] NUTRITION SERVICES     Mode of Communication:  Video Conference via [facility]    OUTPATIENT NUTRITION ASSESSMENT (VIDEO VISIT DUE TO COVID-19)  REASON FOR ASSESSMENT  Margarito Sofia referred by Dr. Denny for MNT related to Hidradenitis suppurativa [L73.2]  - Primary    Patient accompanied by self       ASSESSMENT   Nutrition History:  - Information obtained from patient.  - Patient is on a regular diet at home.  Patient states has struggled with his weight since he was a kid.  Patient is an emotional eater as will eat fast food when stressed.  Patient states mother would prepare foods for comfort for him when he was kid. Patient often multitasks and eats.  Patient eats meal within 5 minutes.      Patient feels his biggest struggle with food is he is always hungry -eats whenever   Patient tries to eat healthy 1 day and then finds it difficult to stay on track   Gets off track by eating unhealthy food   Weekly grocery shopping   [employer] with scrambled eggs  Protein shake (premier protein shake) -feels hungry 1-2 hours after drinking   Pizza -entire pizza  (1 time per week) lunch and dinner   Eats quickly 2 minutes      3/21/2022 Patient states was able to maintain his weight this month which he is pleased with.  Patient wants to begin losing weight.  Patient working long hours.       Egg sandwich (breakfast) ratliff, sausage and cheese (BK) croissant [employer] (170 kcals + 20 grams protein)    2 slices pizza  Hot dogs (2) with buns + chips   Water and diet soda and energy drinks or juice (grape juice)      Keeping snacks under 30 grams carbohydrates; keeping food not in arms reach when working or shey      5/6/ egg mcmufifn with apple + protein shake (9:00 AM)  "   Paying attention to what orders at lunch-[employer] -makes shredded chicken and brown rice, chili, salad  Dinner: 2-3 slices pizza with side salad; chicken wrap; spaghetti with shredded chicken   357 lbs   1 energy drink per day + water   Occasional BK   5-6 hours sleep     6/6/2022 Patient reading labels for carbohydrate intake.  Joined gym and biking (20-30 minutes) or elliptical 1.5 miles (15 minutes) 3-4 times per week.   Trying to make better choices-gluten free spinach shells vs tortilla.  Pizza (2 slices) + salad.  No fast food meals in 1 month.  Greek salad or gyro or Italian (spaghetti and salad) if does take out.     Breakfast-frittata or egg mcmuffin (no hash browns or apple)   Lunch- [place]/leftover + yogurt + apple + P3 protein (ham/almonds); cauliflower macaroni and cheese  Dinner- tacos; spaghetti with shredded chicken   Beverages: energy drink; selter water; water; diet dr eaton/diet dr delaney at dinner     Started taking MVI      Diet Recall:  Breakfast: breakfast sandwich on the way to work   9:00-10:00 crackers or granola bar  Lunch: may bring-spaghetti and meatballs or ham and cheese sandwich    Dinner: drive through 2 sandwich (chicken or cheeseburger) + fries and soft drink BK; chicken breasts + mixed salad or hot dogs (3) on buns  Snack: popcorn (single serving bags); candy bar   Beverages: 1-2 energy drinks 240 mg caffeine; 1/2 gallon water per day on work days; soda or Gatorade weekends   Dining out: every meal      Exercise: Patient joined gym this month and exercises 3-4 times per week (biking -20 minutes or 1.5 miles on elliptical)      NUTRITION FOCUSED PHYSICAL ASSESSMENT (NFPA) FOR DIAGNOSING MALNUTRITION  Yes-visual only     No nutrition related physical findings      LABS  Labs reviewed     MEDICATIONS  Medications reviewed     ANTHROPOMETRICS   Height: 6'0\"  Weight: 352 lbs   BMI (kg/m2): 47.7 kg/m2   Weight Status:  Obesity Grade III BMI >40  %IBW: 197%  Weight History:   Wt " Readings from Last 10 Encounters:   06/06/22 (!) 159.7 kg (352 lb)   05/06/22 (!) 161.9 kg (357 lb)   03/30/22 (!) 164.7 kg (363 lb)   02/16/22 (!) 162.4 kg (358 lb)   01/12/22 (!) 161.9 kg (357 lb)   12/27/21 (!) 160.3 kg (353 lb 6.4 oz)   12/08/21 (!) 159.6 kg (351 lb 14.4 oz)   12/08/21 (!) 160.9 kg (354 lb 11.2 oz)   10/07/21 (!) 152 kg (335 lb)   09/26/19 135.5 kg (298 lb 11.2 oz)     ASSESSED NUTRITION NEEDS  Estimated Energy Needs: 7485-0291 kcals/day (11-14 Kcal/Kg)  Justification:  (obese)  Estimated Protein Needs:  grams protein/day (1.2-1.5 g pro/Kg)  Justification:  (Repletion post surgery)  Estimated Fluid Needs: 8479-0338 mL/day (1 mL/Kcal)     ASSESSED MALNUTRITION STATUS  % Weight Loss:  None noted  % Intake:  No decreased intake noted  Subcutaneous Fat Loss:  None observed   Loss of Muscle Mass:  None observed   Fluid Retention:  None noted      Malnutrition Diagnosis:  Patient does not meet two of the above criteria necessary for diagnosing malnutrition     EVALUATION/PROGRESS TOWARDS GOALS  Previous nutrition goals:  Exercise 4-5 days per week -improving  Set timer on phone to slow down -not met   Place food in backseat if orders fast food -met as did not buy fast food    Previous nutrition diagnosis: Obese, class III related to excess energy intake as evidenced by current BMI of 48.4 kg/m2 -no change      Current nutrition diagnosis: Obese, class III related to excess energy intake as evidenced by current BMI of 47.7 kg/m2      INTERVENTIONS   Nutrition Prescription   Recommend modified calorie intake and behavior modification for weight loss      IMPLEMENTATION   Assessed learning needs and learning preference  Teaching Method(s) used: Booklet / Handout  Explanation  Diet Education:  Provided education on weight loss diet   Nutrition Education (Content):              a)  Discussed progress towards goals.  Review current intake.  Congratulated patient for making meals and reducing portion  sizes.  Further discussion on portion sizes, label reading, eating pattern, exercise and behavior modification. Encouraged 60 grams carbohydrates per meal and 30 grams carbohydrates per snack.  Recommend patient practice avoiding eating while shey.  Suggest gradual diet and behavior changes for long term weight loss success.  Supported patient with the challenge of weight loss.  Provided Food and Exercise log.   Nutrition Education (Application):              a)  Discussed current eating behaviors with focus on slowing down when eating.                b)  Patient verbalizes understanding of diet by stating will reduce carbohydrate portions.     Expected patient engagement: good     GOALS   Track intake   Reduce carbohydrate portions by 25% at meals   Put leftovers in the refrigerator before eating meal      FOLLOW UP/MONITORING   Progress towards goals will be monitored and evaluated per protocol and Practice Guidelines  Patient to follow up in 8 weeks  RD name and number provided      Time Spent with Patient  21 minutes     Adrian Albarran, RD, LD  [facility] Outpatient Dietitian  [phone] (office phone)

## 2022-06-08 ENCOUNTER — HOME INFUSION (PRE-WILLOW HOME INFUSION) (OUTPATIENT)
Dept: PHARMACY | Facility: CLINIC | Age: 37
End: 2022-06-08
Payer: COMMERCIAL

## 2022-06-09 RX ORDER — SULFAMETHOXAZOLE/TRIMETHOPRIM 800-160 MG
1 TABLET ORAL 2 TIMES DAILY
Qty: 180 TABLET | Refills: 1 | Status: SHIPPED | OUTPATIENT
Start: 2022-06-09 | End: 2023-01-20

## 2022-06-09 RX ORDER — BENZOYL PEROXIDE 10 G/100G
SUSPENSION TOPICAL
Qty: 148 ML | Refills: 11 | Status: SHIPPED | OUTPATIENT
Start: 2022-06-09 | End: 2023-01-20

## 2022-06-10 ENCOUNTER — HOME INFUSION (PRE-WILLOW HOME INFUSION) (OUTPATIENT)
Dept: PHARMACY | Facility: CLINIC | Age: 37
End: 2022-06-10
Payer: COMMERCIAL

## 2022-06-10 ENCOUNTER — DOCUMENTATION ONLY (OUTPATIENT)
Dept: PHARMACY | Facility: CLINIC | Age: 37
End: 2022-06-10
Payer: COMMERCIAL

## 2022-06-10 NOTE — PROGRESS NOTES
Skilled Nurse visit in the [facility] to administer Remicade 800 mg IV. No recent elevated temperature, fever, chills, productive cough, coughing for 3 weeks or longer or hemoptysis, abnormal vital signs, night sweats, chest pain. No decrease in appetite, unexplained weight loss or fatigue.  No other new onset medical symptoms.  Current weight 350 lbs.  PIV placed right FA, 4 attempts.  Pre medicated with Tylenol 650 mg po and Benadryl 50 mg po. Labs drawn pre-infusion: none.  Infusion completed without complication or reaction. Pt reports therapy is effective in helping to manage symptoms related to therapy.     CONCEPCION LevinN, RN  [phone]  [email or web address]   no fever and no chills.

## 2022-06-13 NOTE — PROGRESS NOTES
This is a recent snapshot of the patient's [facility] medical record.  For current drug dose and complete information and questions, call [phone] or In Basket pool, [facility] home infusion ([id number])  CSN Number:  [id number]

## 2022-06-15 NOTE — PROGRESS NOTES
This is a recent snapshot of the patient's [facility] medical record.  For current drug dose and complete information and questions, call [phone] or In Basket pool, fv home infusion ([id number])  CSN Number:  [id number]

## 2022-06-20 ENCOUNTER — PATIENT OUTREACH (OUTPATIENT)
Dept: DERMATOLOGY | Facility: CLINIC | Age: 37
End: 2022-06-20
Payer: COMMERCIAL

## 2022-06-20 NOTE — LETTER
Austen 20, 2022          Margarito Sofia  [address]  [place] [address]        Dear Margarito Sofia:    We recently reviewed your medical records from [facility] and found that you are due for an appointment with Madeline Peoples PA-C in December.  Our office has attempted to reach you via telephone and left a message.    At your earliest convenience, please call the clinic at [phone] to arrange the recommended exam and possible testing.  Please kindly mention this letter when calling so that your appointment(s) can be accurately scheduled.      Sincerely,       The Clinic Staff        Medical Record Number:  [id number]

## 2022-06-20 NOTE — CONFIDENTIAL NOTE
Attempted to reach patient to schedule follow up in the Dermatology Clinic.  No answer,  LM on VM to call office and Letter mailed.    Schedule with Madeline Peoples PA-C - HS- Dec.

## 2022-06-22 NOTE — PROGRESS NOTES
This is a recent snapshot of the patient's [facility] medical record.  For current drug dose and complete information and questions, call [phone] or In Basket pool, fv home infusion ([id number])  CSN Number:  [id number]

## 2022-07-06 ENCOUNTER — HOME INFUSION (PRE-WILLOW HOME INFUSION) (OUTPATIENT)
Dept: PHARMACY | Facility: CLINIC | Age: 37
End: 2022-07-06

## 2022-07-08 ENCOUNTER — HOME INFUSION (PRE-WILLOW HOME INFUSION) (OUTPATIENT)
Dept: PHARMACY | Facility: CLINIC | Age: 37
End: 2022-07-08

## 2022-07-08 ENCOUNTER — DOCUMENTATION ONLY (OUTPATIENT)
Dept: PHARMACY | Facility: CLINIC | Age: 37
End: 2022-07-08

## 2022-07-08 NOTE — PROGRESS NOTES
This is a recent snapshot of the patient's [facility] medical record.  For current drug dose and complete information and questions, call [phone] or In Basket pool, fv home infusion ([id number])  CSN Number:  [id number]

## 2022-07-09 NOTE — PROGRESS NOTES
Skilled Nurse visit in the [facility] Infusion Suite to administer Remicade 800 mg IV. No recent elevated temperature, fever, chills, productive cough, coughing for 3 weeks or longer or hemoptysis, abnormal vital signs, night sweats, chest pain. No decrease in appetite, unexplained weight loss or fatigue.  No other new onset medical symptoms.  Current weight 350 lbs.  PIV placed right FA, 1 attempt.  Pre medicated with Tylenol 650 mg po and Benadryl 50 mg po. Labs drawn pre-infusion: none.  Infusion completed without complication or reaction. Pt reports therapy is effective in helping to manage symptoms related to therapy.     Vero Huber, CONCEPCIONN, RN  [phone]  [email or web address]

## 2022-07-11 NOTE — PROGRESS NOTES
This is a recent snapshot of the patient's [facility] medical record.  For current drug dose and complete information and questions, call [phone] or In Basket pool, fv home infusion ([id number])  CSN Number:  [id number]

## 2022-07-20 NOTE — PROGRESS NOTES
This is a recent snapshot of the patient's [facility] medical record.  For current drug dose and complete information and questions, call [phone] or In Basket pool, fv home infusion ([id number])  CSN Number:  [id number]

## 2022-08-01 ENCOUNTER — HOSPITAL ENCOUNTER (OUTPATIENT)
Dept: NUTRITION | Facility: CLINIC | Age: 37
Discharge: HOME | End: 2022-08-01
Attending: DIETITIAN, REGISTERED | Admitting: DIETITIAN, REGISTERED
Payer: COMMERCIAL

## 2022-08-01 ENCOUNTER — MYC MEDICAL ADVICE (OUTPATIENT)
Dept: DERMATOLOGY | Facility: CLINIC | Age: 37
End: 2022-08-01

## 2022-08-01 PROCEDURE — 97803 MED NUTRITION INDIV SUBSEQ: CPT | Mod: GT,95 | Performed by: DIETITIAN, REGISTERED

## 2022-08-01 NOTE — PROGRESS NOTES
Video-Visit Details    Type of service:  Video Visit    Video Start Time (time video started): 3:58    Video End Time (time video stopped): 4:25    Originating Location (pt. Location): Home    Distant Location (provider location):  [facility] NUTRITION SERVICES     Mode of Communication:  Video Conference via [facility]    OUTPATIENT NUTRITION ASSESSMENT (VIDEO VISIT DUE TO COVID-19)  REASON FOR ASSESSMENT  Margarito Sofia referred by Dr. Denny for MNT related to Hidradenitis suppurativa [L73.2]  - Primary    Patient accompanied by self       ASSESSMENT   Nutrition History:  - Information obtained from patient.  - Patient is on a regular diet at home.  Patient states has struggled with his weight since he was a kid.  Patient is an emotional eater as will eat fast food when stressed.  Patient states mother would prepare foods for comfort for him when he was kid. Patient often multitasks and eats.  Patient eats meal within 5 minutes.      Patient feels his biggest struggle with food is he is always hungry -eats whenever   Patient tries to eat healthy 1 day and then finds it difficult to stay on track   Gets off track by eating unhealthy food   Weekly grocery shopping   Exosect with scrambled eggs  Protein shake (premier protein shake) -feels hungry 1-2 hours after drinking   Pizza -entire pizza  (1 time per week) lunch and dinner   Eats quickly 2 minutes      3/21/2022 Patient states was able to maintain his weight this month which he is pleased with.  Patient wants to begin losing weight.  Patient working long hours.       Egg sandwich (breakfast) ratliff, sausage and cheese (BK) croissant OWYM (170 kcals + 20 grams protein)    2 slices pizza  Hot dogs (2) with buns + chips   Water and diet soda and energy drinks or juice (grape juice)      Keeping snacks under 30 grams carbohydrates; keeping food not in arms reach when working or shey      5/6/ egg mcmufifn with apple + protein shake (9:00 AM)     Paying attention to what orders at lunch-[employer] -makes shredded chicken and brown rice, chili, salad  Dinner: 2-3 slices pizza with side salad; chicken wrap; spaghetti with shredded chicken   357 lbs   1 energy drink per day + water   Occasional BK   5-6 hours sleep      6/6/2022 Patient reading labels for carbohydrate intake.  Joined gym and biking (20-30 minutes) or elliptical 1.5 miles (15 minutes) 3-4 times per week.   Trying to make better choices-gluten free spinach shells vs tortilla.  Pizza (2 slices) + salad.  No fast food meals in 1 month.  Greek salad or gyro or Italian (spaghetti and salad) if does take out.      Breakfast-frittata or egg mcmuffin (no hash browns or apple)   Lunch- [place]/leftover + yogurt + apple + P3 protein (ham/almonds); cauliflower macaroni and cheese  Dinner- tacos; spaghetti with shredded chicken   Beverages: energy drink; selter water; water; diet dr eaton/diet dr delaney at dinner      Started taking MVI      8/1/2022 Patient stopping when he feels.  Patient has not been eating fruit in the past 2-3 weeks.  Patient has not weighed self as does not have scale but bought new pants as were too big. Patient not eating out of bags or boxes.        Breakfast: egg McMuffin (7:00-7:30)   Protein shake/Boost (10 AM)   Lunch: bagged salad kit (may add chicken) + Greek yogurt (12 PM)   Snack: beef jerky or popcorn or baked chips   Dinner: chicken or burger jose in ami pocket or taco meat with vegetables or salad (spinach) 5:00-6:00   Snack: limiting snack-baked chips or beef jerky or celery/carrot sticks   Beverages: 1/2 gallon water; 1 diet coke and 1 energy drink     Exercise: 3 times per week running/ellpitical 18 minutes     Diet Recall:  Breakfast: breakfast sandwich on the way to work   9:00-10:00 crackers or granola bar  Lunch: may bring-spaghetti and meatballs or ham and cheese sandwich    Dinner: drive through 2 sandwich (chicken or cheeseburger) + fries and soft drink BK;  "chicken breasts + mixed salad or hot dogs (3) on buns  Snack: popcorn (single serving bags); candy bar   Beverages: 1-2 energy drinks 240 mg caffeine; 1/2 gallon water per day on work days; soda or Gatorade weekends   Dining out: every meal      Exercise: Patient joined gym this month and exercises 3-4 times per week (biking -20 minutes or 1.5 miles on elliptical)      NUTRITION FOCUSED PHYSICAL ASSESSMENT (NFPA) FOR DIAGNOSING MALNUTRITION  Yes-visual only     No nutrition related physical findings      LABS  Labs reviewed     MEDICATIONS  Medications reviewed     ANTHROPOMETRICS   Height: 6'0\"  Weight: 352 lbs   BMI (kg/m2): 47.7 kg/m2   Weight Status:  Obesity Grade III BMI >40  %IBW: 197%  Weight History:   Wt Readings from Last 10 Encounters:   06/06/22 (!) 159.7 kg (352 lb)   05/06/22 (!) 161.9 kg (357 lb)   03/30/22 (!) 164.7 kg (363 lb)   02/16/22 (!) 162.4 kg (358 lb)   01/12/22 (!) 161.9 kg (357 lb)   12/27/21 (!) 160.3 kg (353 lb 6.4 oz)   12/08/21 (!) 159.6 kg (351 lb 14.4 oz)   12/08/21 (!) 160.9 kg (354 lb 11.2 oz)   10/07/21 (!) 152 kg (335 lb)   09/26/19 135.5 kg (298 lb 11.2 oz)      ASSESSED NUTRITION NEEDS  Estimated Energy Needs: 2220-6881 kcals/day (11-14 Kcal/Kg)  Justification:  (obese)  Estimated Protein Needs:  grams protein/day (1.2-1.5 g pro/Kg)  Justification:  (Repletion post surgery)  Estimated Fluid Needs: 0090-7690 mL/day (1 mL/Kcal)     ASSESSED MALNUTRITION STATUS  % Weight Loss:  None noted  % Intake:  No decreased intake noted  Subcutaneous Fat Loss:  None observed   Loss of Muscle Mass:  None observed   Fluid Retention:  None noted      Malnutrition Diagnosis:  Patient does not meet two of the above criteria necessary for diagnosing malnutrition     EVALUATION/PROGRESS TOWARDS GOALS  Previous nutrition goals:  Track intake -not met  Reduce carbohydrate portions by 25% at meals -improving   Put leftovers in the refrigerator before eating meal -met      Previous nutrition " diagnosis: Obese, class III related to excess energy intake as evidenced by current BMI of 47.7 kg/m2 -no change      Current nutrition diagnosis: Obese, class III related to excess energy intake as evidenced by current BMI of 47.7 kg/m2      INTERVENTIONS   Nutrition Prescription   Recommend modified calorie intake and behavior modification for weight loss      IMPLEMENTATION   Assessed learning needs and learning preference  Teaching Method(s) used: Booklet / Handout  Explanation  Diet Education:  Provided education on weight loss diet   Nutrition Education (Content):              a)  Discussed progress towards goals.  Review current intake.  Congratulated patient for continued changes in diet.  Further discussion on portion sizes, label reading, eating pattern, exercise and behavior modification. Encouraged 60 grams carbohydrates per meal and 30 grams carbohydrates per snack. Suggest patient add adequate carbohydrates to meals and not restrict.  Suggest gradual diet and behavior changes for long term weight loss success.  Supported patient with the challenge of weight loss.   Nutrition Education (Application):              a)  Discussed current eating behaviors with focus on slowing down when eating.                b)  Patient verbalizes understanding of diet by stating will balance carbohydrates.    Expected patient engagement: good     GOALS   4 days exercise per week  Add tuna or chicken breast to salad  Eat 1 fruit per day      FOLLOW UP/MONITORING   Progress towards goals will be monitored and evaluated per protocol and Practice Guidelines  Patient to follow up in 8 weeks  RD name and number provided      Time Spent with Patient  27 minutes     Adrian Albarran, RD, LD  [facility] Outpatient Dietitian  [phone] (office phone)

## 2022-08-02 ENCOUNTER — HOME INFUSION (PRE-WILLOW HOME INFUSION) (OUTPATIENT)
Dept: PHARMACY | Facility: CLINIC | Age: 37
End: 2022-08-02

## 2022-08-03 NOTE — PROGRESS NOTES
This is a recent snapshot of the patient's [facility] medical record.  For current drug dose and complete information and questions, call [phone] or In Basket pool, fv home infusion ([id number])  CSN Number:  [id number]

## 2022-08-05 ENCOUNTER — DOCUMENTATION ONLY (OUTPATIENT)
Dept: PHARMACY | Facility: CLINIC | Age: 37
End: 2022-08-05

## 2022-08-05 ENCOUNTER — HOME INFUSION (PRE-WILLOW HOME INFUSION) (OUTPATIENT)
Dept: PHARMACY | Facility: CLINIC | Age: 37
End: 2022-08-05

## 2022-08-05 NOTE — PROGRESS NOTES
This is a recent snapshot of the patient's [facility] medical record.  For current drug dose and complete information and questions, call [phone] or In Basket pool, fv home infusion ([id number])  CSN Number:  [id number]

## 2022-08-06 NOTE — PROGRESS NOTES
Skilled Nurse visit in the [facility] Infusion Suite to administer Remicade 800 mg IV. No recent elevated temperature, fever, chills, productive cough, coughing for 3 weeks or longer or hemoptysis, abnormal vital signs, night sweats, chest pain. No decrease in appetite, unexplained weight loss or fatigue.  No other new onset medical symptoms.  Current weight 350 lbs.  PIV placed left hand, 2 attempts.  Pre medicated with Tylenol 650 mg po and Benadryl 50 mg po. Labs drawn pre-infusion: none.  Infusion completed without complication or reaction. Pt reports therapy is effective in helping to manage symptoms related to therapy.     Yakelin Huber, CONCEPCIONN, RN  [phone]  [email or web address]

## 2022-08-06 NOTE — PROGRESS NOTES
This is a recent snapshot of the patient's [facility] medical record.  For current drug dose and complete information and questions, call [phone] or In Basket pool, fv home infusion ([id number])  CSN Number:  [id number]

## 2022-08-09 NOTE — PROGRESS NOTES
This is a recent snapshot of the patient's [facility] medical record.  For current drug dose and complete information and questions, call [phone] or In Basket pool, fv home infusion ([id number])  CSN Number:  [id number]

## 2022-08-31 ENCOUNTER — HOME INFUSION (PRE-WILLOW HOME INFUSION) (OUTPATIENT)
Dept: PHARMACY | Facility: CLINIC | Age: 37
End: 2022-08-31

## 2022-09-02 ENCOUNTER — HOME INFUSION (PRE-WILLOW HOME INFUSION) (OUTPATIENT)
Dept: PHARMACY | Facility: CLINIC | Age: 37
End: 2022-09-02

## 2022-09-06 NOTE — PROGRESS NOTES
This is a recent snapshot of the patient's [facility] medical record.  For current drug dose and complete information and questions, call [phone] or In Basket pool, fv home infusion ([id number])  CSN Number:  [id number]

## 2022-09-17 ENCOUNTER — HEALTH MAINTENANCE LETTER (OUTPATIENT)
Age: 37
End: 2022-09-17

## 2022-09-28 ENCOUNTER — HOME INFUSION (PRE-WILLOW HOME INFUSION) (OUTPATIENT)
Dept: PHARMACY | Facility: CLINIC | Age: 37
End: 2022-09-28

## 2022-09-29 NOTE — PROGRESS NOTES
This is a recent snapshot of the patient's [facility] medical record.  For current drug dose and complete information and questions, call [phone] or In Basket pool, fv home infusion ([id number])  CSN Number:  [id number]

## 2022-09-30 ENCOUNTER — DOCUMENTATION ONLY (OUTPATIENT)
Dept: PHARMACY | Facility: CLINIC | Age: 37
End: 2022-09-30

## 2022-09-30 ENCOUNTER — HOME INFUSION (PRE-WILLOW HOME INFUSION) (OUTPATIENT)
Dept: PHARMACY | Facility: CLINIC | Age: 37
End: 2022-09-30

## 2022-10-01 NOTE — PROGRESS NOTES
Skilled Nurse visit in the [facility] Infusion Suite to administer Remicade 800 mg IV. No recent elevated temperature, fever, chills, productive cough, coughing for 3 weeks or longer or hemoptysis, abnormal vital signs, night sweats, chest pain. No decrease in appetite, unexplained weight loss or fatigue.  No other new onset medical symptoms.  Current weight 350 lbs.  PIV placed left hand, 2 attempts.  Pre medicated with Tylenol 650 mg po and Benadryl 50 mg po. Labs drawn pre-infusion: none.  Infusion completed without complication or reaction. Pt reports therapy is effective in helping to manage symptoms related to therapy.     Yakelin Huber, CONCEPCIONN, RN  [phone]  [email or web address]

## 2022-10-01 NOTE — PROGRESS NOTES
Skilled [facility] visit in the hospitals Infusion Suite to administer Remicade 800 mg IV. No recent elevated temperature, fever, chills, productive cough, coughing for 3 weeks or longer or hemoptysis, abnormal vital signs, night sweats, chest pain. No decrease in appetite, unexplained weight loss or fatigue.  No other new onset medical symptoms.  Current weight 350 lbs.  PIV placed in right FA, 1 attempt.  Pre medicated with Tylenol 650 mg po and Benadryl 50 mg po. Labs drawn pre-infusion: none.  Infusion completed without complication or reaction. Pt reports therapy is effective in helping to manage symptoms related to therapy.     Yakelin Huber, CONCEPCIONN, RN  [phone]  [email or web address]

## 2022-10-03 NOTE — PROGRESS NOTES
This is a recent snapshot of the patient's [facility] medical record.  For current drug dose and complete information and questions, call [phone] or In Basket pool, fv home infusion ([id number])  CSN Number:  [id number]

## 2022-10-25 NOTE — PROGRESS NOTES
This is a recent snapshot of the patient's [facility] medical record.  For current drug dose and complete information and questions, call [phone] or In Basket pool, fv home infusion ([id number])  CSN Number:  [id number]

## 2022-10-28 ENCOUNTER — DOCUMENTATION ONLY (OUTPATIENT)
Dept: PHARMACY | Facility: CLINIC | Age: 37
End: 2022-10-28

## 2022-10-28 NOTE — PROGRESS NOTES
This is a recent snapshot of the patient's [facility] medical record.  For current drug dose and complete information and questions, call [phone] or In Basket pool, fv home infusion ([id number])  CSN Number:  [id number]

## 2022-10-28 NOTE — PROGRESS NOTES
Skilled Nurse visit in the [facility] Infusion Suite to administer Remicade 800 mg IV. No recent elevated temperature, fever, chills, productive cough, coughing for 3 weeks or longer or hemoptysis, abnormal vital signs, night sweats, chest pain. No decrease in appetite, unexplained weight loss or fatigue.  No other new onset medical symptoms.  Current weight 350 lbs.  PIV placed in right AC, 4 attempts.  Pre medicated with Tylenol 650 mg po and Benadryl 50 mg po. Labs drawn pre-infusion: none.  Infusion completed without complication or reaction. Pt reports therapy is effective in helping to manage symptoms related to therapy.     Yakelin Huber, CONCEPCIONN, RN  [phone]  [email or web address]

## 2022-10-31 ENCOUNTER — VIRTUAL VISIT (OUTPATIENT)
Dept: INTERNAL MEDICINE | Facility: CLINIC | Age: 37
End: 2022-10-31
Payer: COMMERCIAL

## 2022-10-31 ENCOUNTER — TELEPHONE (OUTPATIENT)
Dept: INTERNAL MEDICINE | Facility: CLINIC | Age: 37
End: 2022-10-31

## 2022-10-31 DIAGNOSIS — T78.40XA ALLERGIC REACTION, INITIAL ENCOUNTER: Primary | ICD-10-CM

## 2022-10-31 DIAGNOSIS — F43.22 ADJUSTMENT DISORDER WITH ANXIOUS MOOD: ICD-10-CM

## 2022-10-31 DIAGNOSIS — Z87.898 HISTORY OF PREDIABETES: ICD-10-CM

## 2022-10-31 PROCEDURE — 99214 OFFICE O/P EST MOD 30 MIN: CPT | Mod: 95 | Performed by: INTERNAL MEDICINE

## 2022-10-31 RX ORDER — EPINEPHRINE 0.3 MG/.3ML
0.3 INJECTION SUBCUTANEOUS PRN
Qty: 2 EACH | Refills: 3 | Status: SHIPPED | OUTPATIENT
Start: 2022-10-31

## 2022-10-31 RX ORDER — FLUOXETINE 10 MG/1
10 CAPSULE ORAL DAILY
Qty: 90 CAPSULE | Refills: 3 | Status: SHIPPED | OUTPATIENT
Start: 2022-10-31 | End: 2023-06-26

## 2022-10-31 NOTE — TELEPHONE ENCOUNTER
Left Voicemail (1st Attempt) for the patient to call back and schedule the following:    Appointment type: IN Person   Provider: Carina Clay  Return date: 04/30/23  Specialty phone number: [phone]  Additional appointment(s) needed: 1  Additonal Notes: NA

## 2022-10-31 NOTE — PATIENT INSTRUCTIONS
Omron is an accurate home blood pressure cuff, the cost is around $40 on [employer] or at a local pharmacy.      You can obtain flu and pneumonia vaccines at your local pharmacy, or, schedule a nurse visit on the same day as your lab draw for an immunization update.

## 2022-10-31 NOTE — PROGRESS NOTES
"Margarito is a very pleasant 36 year old who is being evaluated via a billable video visit to establish primary care.  Referred by Dr. Denny in Dermatology.    He has a past medical history of Hidradenitis suppurativa and Morbid obesity (H).  His father had a history of leukemia; mother's side has cardiac history. He also had an eye injury at work that resulted in a fungal infection and near blindness in that eye.      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  Margarito was seeing a nutritionist for some time, with a change in diet for the better, recently went \"off the rails\" with travel and other events.  We discussed healthy diet and getting more regular exercise.    We also discussed his BP which tends to be elevated at his monthly infusion but he is a bit needle phobic.  I advised getting a home BP cuff to check a few more readings at home.  His A1c was in the prediabetic range in the past, I'd like to repeat that along with a lipid panel and other routine labs.    He's endorsed more anxiety as of late, high levels of stress, irritable mood and bouts of anger.  I recommend starting an selective serotonin reuptake inhibitor and establishing with health psychology for counseling.    He also needs an epipen for a severe bee allergy.  We discussed getting flu and pneumovax given his immune modulating medication.    Current Outpatient Medications:      acetaminophen (TYLENOL) 325 MG tablet, Take 2 tablets (650 mg) by mouth every 4 hours as needed for other (For optimal non-opioid multimodal pain management to improve pain control.), Disp: 90 tablet, Rfl: 0     benzoyl peroxide (PANOXYL) 10 % external liquid, Use daily as directed, Disp: 148 mL, Rfl: 11     desonide (DESOWEN) 0.05 % external cream, Apply to rash in groin twice a day as needed for rash, Disp: 60 g, Rfl: 1     inFLIXimab (REMICADE) 100 MG injection, 700 mg every 28 days , Disp: , Rfl:      sulfamethoxazole-trimethoprim (BACTRIM DS) " 800-160 MG tablet, Take 1 tablet by mouth 2 times daily, Disp: 180 tablet, Rfl: 1     diazepam (VALIUM) 5 MG tablet, Take 1-2 tablets (5-10 mg) by mouth every 6 hours as needed for muscle spasms, Disp: 15 tablet, Rfl: 0     fluorometholone (FML LIQUIFILM) 0.1 % ophthalmic suspension, SHAKE LQ AND INT 1 GTT IN OU D, Disp: , Rfl: 5     glycopyrrolate (ROBINUL) 1 MG tablet, Take 1 tablet (1 mg) by mouth 2 times daily, Disp: 60 tablet, Rfl: 3     ondansetron (ZOFRAN-ODT) 4 MG ODT tab, Take 1 tablet (4 mg) by mouth every 6 hours as needed for nausea or vomiting, Disp: 10 tablet, Rfl: 0     polyethylene glycol (MIRALAX) 17 GM/Dose powder, Take 17 g by mouth daily, Disp: 510 g, Rfl: 0     senna-docusate (SENOKOT-S/PERICOLACE) 8.6-50 MG tablet, Take 1 tablet by mouth 2 times daily, Disp: 60 tablet, Rfl: 0     vitamin D3 (CHOLECALCIFEROL) 1.25 MG (70026 UT) capsule, Take 1 capsule (50,000 Units) by mouth every 7 days, Disp: 12 capsule, Rfl: 3     Vitamin D3 (CHOLECALCIFEROL) 25 mcg (1000 units) tablet, Take 2 tablets (50 mcg) by mouth daily, Disp: 180 tablet, Rfl: 4    Current Facility-Administered Medications:      lidocaine 1% with EPINEPHrine 1:100,000 injection 3 mL, 3 mL, Intradermal, Once, Madeline Peoples PA-C     triamcinolone acetonide (KENALOG-10) injection 10 mg, 10 mg, Intra-Lesional, Once, Madeline Peoples PA-C    Pt reports taking a multivitamin    How would you like to obtain your AVS? MyChart  If the video visit is dropped, the invitation should be resent by: Send to e-mail at: [email or web address]  Will anyone else be joining your video visit? No      On exam: alert, NAD, no audible cough or wheezing, visible skin is without erythema or rash, mood/affect seem engaged and upbeat    A/P Margarito was seen today for establish care    Diagnoses and all orders for this visit:    Allergic reaction, initial encounter  -     EPINEPHrine (ANY BX GENERIC EQUIV) 0.3 MG/0.3ML injection 2-pack; Inject 0.3 mLs (0.3 mg)  into the muscle as needed for anaphylaxis May repeat one time in 5-15 minutes if response to initial dose is inadequate.    Adjustment disorder with anxious mood  -     FLUoxetine (PROZAC) 10 MG capsule; Take 1 capsule (10 mg) by mouth daily  -     Adult Mental Health  Referral; Future    History of prediabetes  -     Lipid Profile FASTING; Future  -     Hemoglobin A1c; Future  -     Comprehensive metabolic panel; Future  -     TSH with free T4 reflex; Future    RTC in 6 months for in person visit    Carina Gallagher MD      Video-Visit Details    Type of service:  Video Visit started 9:10 ended 9:27 with another 15 minutes chart review and documentation same day    Originating Location (pt. Location): Home        Distant Location (provider location):  On-site    Platform used for Video Visit: Erin

## 2022-11-08 NOTE — PROGRESS NOTES
This is a recent snapshot of the patient's [facility] medical record.  For current drug dose and complete information and questions, call [phone] or In Basket pool, fv home infusion ([id number])  CSN Number:  [id number]

## 2022-12-03 NOTE — PROGRESS NOTES
This is a recent snapshot of the patient's [facility] medical record.  For current drug dose and complete information and questions, call [phone] or In Basket pool, fv home infusion ([id number])  CSN Number:  [id number]

## 2022-12-14 ENCOUNTER — MYC MEDICAL ADVICE (OUTPATIENT)
Dept: DERMATOLOGY | Facility: CLINIC | Age: 37
End: 2022-12-14

## 2022-12-15 ENCOUNTER — TELEPHONE (OUTPATIENT)
Dept: DERMATOLOGY | Facility: CLINIC | Age: 37
End: 2022-12-15

## 2022-12-15 NOTE — TELEPHONE ENCOUNTER
[facility] Call Center    Phone Message    May a detailed message be left on voicemail: yes     Reason for Call: Other: Urgent Request - [facility] needs verbal approval to move patient's next infusion to Monday as he will be out of town for the scheduled infusion and if he doesn't have it Monday he will go a month with no infusion   Please call back  Thank you    Action Taken: Message routed to:  [facility] ([facility] Derm Rheum    Travel Screening: Not Applicable

## 2022-12-19 ENCOUNTER — DOCUMENTATION ONLY (OUTPATIENT)
Dept: PHARMACY | Facility: CLINIC | Age: 37
End: 2022-12-19

## 2022-12-19 NOTE — PROGRESS NOTES
Skilled Nurse visit in the [facility] Infusion Site to administer Remicade 800mg.  No recent elevated temperature, fever, chills, productive cough, coughing for 3 weeks or longer or hemoptysis, abnormal vital signs, night sweats, chest pain. No  decrease in your appetite, unexplained weight loss or fatigue.  No other new onset medical symptoms.  Current weight 350lb.  Peripheral IVright AC, 1 attempt Pre medicated with none. Labs drawn none. Infusion completed without complication or reaction. Pt reports therapy is effective in managing symptoms related to therapy.  Sherrie Coleman RN  [facility] [facility]  [email or web address]  [phone]

## 2022-12-29 NOTE — PROGRESS NOTES
This is a recent snapshot of the patient's [facility] medical record.  For current drug dose and complete information and questions, call [phone] or In Basket pool, fv home infusion ([id number])  CSN Number:  [id number]

## 2023-01-16 ENCOUNTER — DOCUMENTATION ONLY (OUTPATIENT)
Dept: PHARMACY | Facility: CLINIC | Age: 38
End: 2023-01-16

## 2023-01-16 NOTE — PROGRESS NOTES
Skilled Nurse visit in the [facility] Ambulatory Infusion Site to administer Remicade.  No recent elevated temperature, fever, chills, productive cough, coughing for 3 weeks or longer or hemoptysis, abnormal vital signs, night sweats, chest pain. No  decrease in your appetite, unexplained weight loss or fatigue.  No other new onset medical symptoms.  Current weight 350lbs.  Peripheral IV, right Lower Forearm, 1   attempt. Pre medicated with tylenol and benadryl. Infusion completed without complication or reaction. Pt reports therapy is effective in managing symptoms related to therapy.

## 2023-01-20 ENCOUNTER — OFFICE VISIT (OUTPATIENT)
Dept: DERMATOLOGY | Facility: CLINIC | Age: 38
End: 2023-01-20
Payer: COMMERCIAL

## 2023-01-20 DIAGNOSIS — L30.8 PSORIASIFORM ERUPTION: ICD-10-CM

## 2023-01-20 DIAGNOSIS — L73.2 HIDRADENITIS SUPPURATIVA: Primary | ICD-10-CM

## 2023-01-20 DIAGNOSIS — D49.2 NEOPLASM OF UNSPECIFIED BEHAVIOR OF BONE, SOFT TISSUE, AND SKIN: ICD-10-CM

## 2023-01-20 DIAGNOSIS — Z87.898 HISTORY OF ATYPICAL NEVUS: ICD-10-CM

## 2023-01-20 DIAGNOSIS — L91.8 INFLAMED SKIN TAG: ICD-10-CM

## 2023-01-20 DIAGNOSIS — Z79.899 ENCOUNTER FOR LONG-TERM (CURRENT) USE OF HIGH-RISK MEDICATION: ICD-10-CM

## 2023-01-20 PROCEDURE — 88305 TISSUE EXAM BY PATHOLOGIST: CPT | Mod: TC | Performed by: PHYSICIAN ASSISTANT

## 2023-01-20 PROCEDURE — 99214 OFFICE O/P EST MOD 30 MIN: CPT | Mod: 25 | Performed by: PHYSICIAN ASSISTANT

## 2023-01-20 PROCEDURE — 11200 RMVL SKIN TAGS UP TO&INC 15: CPT | Mod: XS | Performed by: PHYSICIAN ASSISTANT

## 2023-01-20 PROCEDURE — 11102 TANGNTL BX SKIN SINGLE LES: CPT | Performed by: PHYSICIAN ASSISTANT

## 2023-01-20 PROCEDURE — 88305 TISSUE EXAM BY PATHOLOGIST: CPT | Mod: 26 | Performed by: DERMATOLOGY

## 2023-01-20 RX ORDER — SULFAMETHOXAZOLE/TRIMETHOPRIM 800-160 MG
1 TABLET ORAL 2 TIMES DAILY
Qty: 180 TABLET | Refills: 1 | Status: SHIPPED | OUTPATIENT
Start: 2023-01-20 | End: 2023-07-13

## 2023-01-20 RX ORDER — BENZOYL PEROXIDE 10 G/100G
SUSPENSION TOPICAL
Qty: 148 ML | Refills: 11 | Status: SHIPPED | OUTPATIENT
Start: 2023-01-20 | End: 2024-08-07

## 2023-01-20 RX ORDER — DESONIDE 0.5 MG/G
CREAM TOPICAL
Qty: 60 G | Refills: 1 | Status: SHIPPED | OUTPATIENT
Start: 2023-01-20 | End: 2023-10-23

## 2023-01-20 ASSESSMENT — PAIN SCALES - GENERAL: PAINLEVEL: NO PAIN (0)

## 2023-01-20 NOTE — NURSING NOTE
Lidocaine-epinephrine 1-1:358214 % injection   0.5mL once for one use, starting 1/20/2023 ending 1/20/2023,  2mL disp, R-0, injection  Injected by Jazmine Cerda RN

## 2023-01-20 NOTE — LETTER
1/20/2023       RE: Margarito Sofia  [address]  [address]  [place] [address]     Dear Colleague,    Thank you for referring your patient, Margarito Sofia, to the [facility] DERMATOLOGY CLINIC [place] at [facility]. Please see a copy of my visit note below.    [facility] Dermatology Note  Encounter Date: Jan 20, 2023  Office Visit     Dermatology Problem List:  # NUB, right lateral forehead, s/p shave bx 1/20/2023  1. Hidradenitis suppurativa.  - Current recommended tx: infliximab [id number] with 40mg methylpred, Bactrim, BP wash  - ILK to two sites 3/4/21  - Site at right axilla excised on 3/30/22 by plastics   - Labs done 1/20/2023  - Past tx: has been on ABx for acne, clindamycin, rifampin, prednisone, methotrexate 15mg (discontinued Summer 2020 due to elevated LFTs)  2. Acne vulgaris  - Has tried Accutane and a number of antibiotics   3. Hx of atypical nevus - ~2005 - removed in [place] -patient will bring us records at next visit  4. Hyperhydrosis  - Glycopyrrolate 1mg BID  5. Psoriasiform eruption, bilateral inguinal folds   - desonide cream  6. Inflamed skin tags, s/p cryotherapy     SH:   ____________________________________________    Assessment & Plan:    # NUB, right lateral forehead, ddx: lentigo vs MM vs other  - Shave biopsy today, see procedure note below    # Hidradenitis suppurativa, Rodney stage II. Stable currently.  - S/p surgery in right axilla and left axilla, healing well. Some activity in left axilla. Patient will follow-up with Dr. Ivy for this. Will also consider excision in the neck.   - Continue bactrim DS twice daily. Refilled today.  - Continue BP wash, refilled  - Continue infliximab 5mg/kg  - Following with nutrition  - Labs ordered today: CBC, CMP, TB   - Recommend shingles, pneumonia, COVID booster    # Psoriasiform eruption, b/l inguinal folds  - Continue applying desonide  cream twice daily to the affected areas. Refilled today.    # Hx of atypical nevus - ~2005 - removed in [place]  - Patient will try to bring us records at next visit    # Inflamed acrochordons, neck x 15  - Cryotherapy today, see procedure note below    Procedures Performed:   - Cryotherapy procedure note, location(s): see above. After verbal consent and discussion of risks and benefits including, but not limited to, dyspigmentation/scar, blister, and pain, 15 lesion(s) was(were) treated with 1-2 mm freeze border for 1-2 cycles with liquid nitrogen. Post cryotherapy instructions were provided.    - Shave biopsy procedure note, location(s): see above. After discussion of benefits and risks including but not limited to bleeding, infection, scar, incomplete removal, recurrence, and non-diagnostic biopsy, written consent and photographs were obtained. The area was cleaned with isopropyl alcohol. 0.5mL of 1% lidocaine with epinephrine was injected to obtain adequate anesthesia of lesion(s). Shave biopsy at site(s) performed. Hemostasis was achieved with aluminium chloride. Petrolatum ointment and a sterile dressing were applied. The patient tolerated the procedure and no complications were noted. The patient was provided with verbal and written post care instructions.     Follow-up: 6 month(s) in-person, or earlier for new or changing lesions    Staff and Scribe:     Scribe Disclosure:  I, PAT CHAND, am serving as a scribe to document services personally performed by Madeline Peoples PA-C based on data collection and the provider's statements to me.   Provider Disclosure:   The documentation recorded by the scribe accurately reflects the services I personally performed and the decisions made by me.    All risks, benefits and alternatives were discussed with patient.  Patient is in agreement and understands the assessment and plan.  All questions were answered.    Madeline Peoples PA-C, MPAS  [facility]  [facility]: Phone: [phone], Fax: [phone]  [facility]: Phone: [phone],  Fax: [phone]  [facility] [place]: Phone: [phone], Fax: [phone]    ____________________________________________    CC: Skin Check (HS 6 month follow up- Margarito states at the moment doing good but has had couple flare ups in last month)    HPI:  Mr. Margarito Sofia is a(n) 37 year old male who presents today as a return patient for HS follow up. Last seen by Dr. Denny on 6/2/22, at which time the patient was started on desonide cream for treatment of a psoriasiform eruption on the bilateral inguinal folds.     Today, the patient reports that his HS if fairly controlled right now. Recently had spots excised by plastics in the left and right axillae. However, he had a few flares in the last month, particularly in the left axilla at sites that were not excised.     Additionally, the patient reports bothersome lesions around his neck. He also has a spot that seems new on his forehead.    Patient is otherwise feeling well, without additional skin concerns.    Labs Reviewed:  N/A    Physical Exam:  Vitals: There were no vitals taken for this visit.  SKIN: Focused examination of the neck, chest, groin, and axillae was performed.  - 2 mm dark brown macule on the right lateral forehead.  - Multiple erythematous scars, nodules, tract formation and additional tissue in the left axilla.    - One active lesion on the left upper groin.  - Mild erythema along the bilateral inguinal folds.   - There is(are) skin colored pedunculated papules with erythema on the neck.   - No other lesions of concern on areas examined.         Medications:  Current Outpatient Medications   Medication     acetaminophen (TYLENOL) 325 MG tablet     benzoyl peroxide (PANOXYL) 10 % external liquid     desonide (DESOWEN) 0.05 % external cream     EPINEPHrine (ANY BX  GENERIC EQUIV) 0.3 MG/0.3ML injection 2-pack     FLUoxetine (PROZAC) 10 MG capsule     inFLIXimab (REMICADE) 100 MG injection     sulfamethoxazole-trimethoprim (BACTRIM DS) 800-160 MG tablet     Current Facility-Administered Medications   Medication     lidocaine 1% with EPINEPHrine 1:100,000 injection 3 mL     triamcinolone acetonide (KENALOG-10) injection 10 mg      Past Medical History:   Patient Active Problem List   Diagnosis     Hidradenitis suppurativa     Morbid obesity (H)     Past Medical History:   Diagnosis Date     Hidradenitis suppurativa      Morbid obesity (H)               Again, thank you for allowing me to participate in the care of your patient.      Sincerely,    Madeline Peoples PA-C

## 2023-01-20 NOTE — NURSING NOTE
Dermatology Rooming Note    Margarito Sofia's goals for this visit include:   Chief Complaint   Patient presents with     Skin Check     HS 6 month follow up- Margarito states at the moment doing good but has had couple flare ups in last month     Melonie Johnson, Visit Facilitator

## 2023-01-20 NOTE — LETTER
Date:January 23, 2023      Patient was self referred, no letter generated. Do not send.        [facility] Health Information

## 2023-01-20 NOTE — PROGRESS NOTES
[facility] Dermatology Note  Encounter Date: Jan 20, 2023  Office Visit     Dermatology Problem List:  # NUB, right lateral forehead, s/p shave bx 1/20/2023  1. Hidradenitis suppurativa.  - Current recommended tx: infliximab [id number] with 40mg methylpred, Bactrim, BP wash  - ILK to two sites 3/4/21  - Site at right axilla excised on 3/30/22 by plastics   - Labs done 1/20/2023  - Past tx: has been on ABx for acne, clindamycin, rifampin, prednisone, methotrexate 15mg (discontinued Summer 2020 due to elevated LFTs)  2. Acne vulgaris  - Has tried Accutane and a number of antibiotics   3. Hx of atypical nevus - ~2005 - removed in [place] -patient will bring us records at next visit  4. Hyperhydrosis  - Glycopyrrolate 1mg BID  5. Psoriasiform eruption, bilateral inguinal folds   - desonide cream  6. Inflamed skin tags, s/p cryotherapy     SH:   ____________________________________________    Assessment & Plan:    # NUB, right lateral forehead, ddx: lentigo vs MM vs other  - Shave biopsy today, see procedure note below    # Hidradenitis suppurativa, Rodney stage II. Stable currently.  - S/p surgery in right axilla and left axilla, healing well. Some activity in left axilla. Patient will follow-up with Dr. Ivy for this. Will also consider excision in the neck.   - Continue bactrim DS twice daily. Refilled today.  - Continue BP wash, refilled  - Continue infliximab 5mg/kg  - Following with nutrition  - Labs ordered today: CBC, CMP, TB   - Recommend shingles, pneumonia, COVID booster    # Psoriasiform eruption, b/l inguinal folds  - Continue applying desonide cream twice daily to the affected areas. Refilled today.    # Hx of atypical nevus - ~2005 - removed in [place]  - Patient will try to bring us records at next visit    # Inflamed acrochordons, neck x 15  - Cryotherapy today, see procedure note below    Procedures Performed:   - Cryotherapy procedure note, location(s):  see above. After verbal consent and discussion of risks and benefits including, but not limited to, dyspigmentation/scar, blister, and pain, 15 lesion(s) was(were) treated with 1-2 mm freeze border for 1-2 cycles with liquid nitrogen. Post cryotherapy instructions were provided.    - Shave biopsy procedure note, location(s): see above. After discussion of benefits and risks including but not limited to bleeding, infection, scar, incomplete removal, recurrence, and non-diagnostic biopsy, written consent and photographs were obtained. The area was cleaned with isopropyl alcohol. 0.5mL of 1% lidocaine with epinephrine was injected to obtain adequate anesthesia of lesion(s). Shave biopsy at site(s) performed. Hemostasis was achieved with aluminium chloride. Petrolatum ointment and a sterile dressing were applied. The patient tolerated the procedure and no complications were noted. The patient was provided with verbal and written post care instructions.     Follow-up: 6 month(s) in-person, or earlier for new or changing lesions    Staff and Scribe:     Scribe Disclosure:  IPAT, am serving as a scribe to document services personally performed by Madeline Peoples PA-C based on data collection and the provider's statements to me.   Provider Disclosure:   The documentation recorded by the scribe accurately reflects the services I personally performed and the decisions made by me.    All risks, benefits and alternatives were discussed with patient.  Patient is in agreement and understands the assessment and plan.  All questions were answered.    Madeline Peoples PA-C, MPAS  [facility] [place]: Phone: [phone], Fax: [phone]  [facility]: Phone: [phone],  Fax: [phone]  [facility]: Phone: [phone], Fax: [phone]    ____________________________________________    CC: Skin Check (HS 6 month  follow up- Margarito states at the moment doing good but has had couple flare ups in last month)    HPI:  Mr. Margarito Sofia is a(n) 37 year old male who presents today as a return patient for HS follow up. Last seen by Dr. Denny on 6/2/22, at which time the patient was started on desonide cream for treatment of a psoriasiform eruption on the bilateral inguinal folds.     Today, the patient reports that his HS if fairly controlled right now. Recently had spots excised by plastics in the left and right axillae. However, he had a few flares in the last month, particularly in the left axilla at sites that were not excised.     Additionally, the patient reports bothersome lesions around his neck. He also has a spot that seems new on his forehead.    Patient is otherwise feeling well, without additional skin concerns.    Labs Reviewed:  N/A    Physical Exam:  Vitals: There were no vitals taken for this visit.  SKIN: Focused examination of the neck, chest, groin, and axillae was performed.  - 2 mm dark brown macule on the right lateral forehead.  - Multiple erythematous scars, nodules, tract formation and additional tissue in the left axilla.    - One active lesion on the left upper groin.  - Mild erythema along the bilateral inguinal folds.   - There is(are) skin colored pedunculated papules with erythema on the neck.   - No other lesions of concern on areas examined.         Medications:  Current Outpatient Medications   Medication     acetaminophen (TYLENOL) 325 MG tablet     benzoyl peroxide (PANOXYL) 10 % external liquid     desonide (DESOWEN) 0.05 % external cream     EPINEPHrine (ANY BX GENERIC EQUIV) 0.3 MG/0.3ML injection 2-pack     FLUoxetine (PROZAC) 10 MG capsule     inFLIXimab (REMICADE) 100 MG injection     sulfamethoxazole-trimethoprim (BACTRIM DS) 800-160 MG tablet     Current Facility-Administered Medications   Medication     lidocaine 1% with EPINEPHrine 1:100,000 injection 3 mL     triamcinolone  acetonide (KENALOG-10) injection 10 mg      Past Medical History:   Patient Active Problem List   Diagnosis     Hidradenitis suppurativa     Morbid obesity (H)     Past Medical History:   Diagnosis Date     Hidradenitis suppurativa      Morbid obesity (H)

## 2023-01-20 NOTE — PATIENT INSTRUCTIONS
Cryotherapy    What is it?  Use of a very cold liquid, such as liquid nitrogen, to freeze and destroy abnormal skin cells that need to be removed    What should I expect?  Tenderness and redness  A small blister that might grow and fill with dark purple blood. There may be crusting.  More than one treatment may be needed if the lesions do not go away.    How do I care for the treated area?  Gently wash the area with your hands when bathing.  Use a thin layer of Vaseline to help with healing. You may use a Band-Aid.   The area should heal within 7-10 days and may leave behind a pink or lighter color.   Do not use an antibiotic or Neosporin ointment.   You may take acetaminophen (Tylenol) for pain.     Call your doctor if you have:  Severe pain  Signs of infection (warmth, redness, cloudy yellow drainage, and or a bad smell)  Questions or concerns    Who should I call with questions?      [facility]: [phone]      [facility]: [phone]      For urgent needs outside of business hours call the [facility] at [phone] and ask for the dermatology resident on call Wound Care After a Biopsy    What is a skin biopsy?  A skin biopsy allows the doctor to examine a very small piece of tissue under the microscope to determine the diagnosis and the best treatment for the skin condition. A local anesthetic (numbing medicine)  is injected with a very small needle into the skin area to be tested. A small piece of skin is taken from the area. Sometimes a suture (stitch) is used.     What are the risks of a skin biopsy?  I will experience scar, bleeding, swelling, pain, crusting and redness. I may experience incomplete removal or recurrence. Risks of this procedure are excessive bleeding, bruising, infection, nerve damage, numbness, thick (hypertrophic or keloidal) scar and non-diagnostic biopsy.    How should I care for my wound for the first 24  hours?  Keep the wound dry and covered for 24 hours  If it bleeds, hold direct pressure on the area for 15 minutes. If bleeding does not stop then go to the emergency room  Avoid strenuous exercise the first 1-2 days or as your doctor instructs you    How should I care for the wound after 24 hours?  After 24 hours, remove the bandage  You may bathe or shower as normal  If you had a scalp biopsy, you can shampoo as usual and can use shower water to clean the biopsy site daily  Clean the wound twice a day with gentle soap and water  Do not scrub, be gentle  Apply white petroleum/Vaseline after cleaning the wound with a cotton swab or a clean finger, and keep the site covered with a Bandaid /bandage. Bandages are not necessary with a scalp biopsy  If you are unable to cover the site with a Bandaid /bandage, re-apply ointment 2-3 times a day to keep the site moist. Moisture will help with healing  Avoid strenuous activity for first 1-2 days  Avoid lakes, rivers, pools, and oceans until the stitches are removed or the site is healed    How do I clean my wound?  Wash hands thoroughly with soap or use hand  before all wound care  Clean the wound with gentle soap and water  Apply white petroleum/Vaseline  to wound after it is clean  Replace the Bandaid /bandage to keep the wound covered for the first few days or as instructed by your doctor  If you had a scalp biopsy, warm shower water to the area on a daily basis should suffice    What should I use to clean my wound?   Cotton-tipped applicators (Qtips )  White petroleum jelly (Vaseline ). Use a clean new container and use Q-tips to apply.  Bandaids   as needed  Gentle soap     How should I care for my wound long term?  Do not get your wound dirty  Keep up with wound care for one week or until the area is healed.  A small scab will form and fall off by itself when the area is completely healed. The area will be red and will become pink in color as it heals. Sun  protection is very important for how your scar will turn out. Sunscreen with an SPF 30 or greater is recommended once the area is healed.  You should have some soreness but it should be mild and slowly go away over several days. Talk to your doctor about using tylenol for pain,    When should I call my doctor?  If you have increased:   Pain or swelling  Pus or drainage (clear or slightly yellow drainage is ok)  Temperature over 100F  Spreading redness or warmth around wound    When will I hear about my results?  The biopsy results can take 2 weeks to come back.  Your results will automatically release to [email or web address] before your provider has even reviewed them.  The clinic will call you with the results, send you a Dez[employer] message, or have you schedule a follow-up clinic or phone time to discuss the results.  Contact our clinics if you do not hear from us in 2 weeks.    Who should I call with questions?  [facility]: [phone]  [facility]: [phone]  For urgent needs outside of business hours call the [facility] at [phone] and ask for the dermatology resident on call

## 2023-01-23 LAB
PATH REPORT.COMMENTS IMP SPEC: NORMAL
PATH REPORT.COMMENTS IMP SPEC: NORMAL
PATH REPORT.FINAL DX SPEC: NORMAL
PATH REPORT.GROSS SPEC: NORMAL
PATH REPORT.MICROSCOPIC SPEC OTHER STN: NORMAL
PATH REPORT.RELEVANT HX SPEC: NORMAL

## 2023-01-24 ENCOUNTER — MYC MEDICAL ADVICE (OUTPATIENT)
Dept: DERMATOLOGY | Facility: CLINIC | Age: 38
End: 2023-01-24

## 2023-01-24 ENCOUNTER — VIRTUAL VISIT (OUTPATIENT)
Dept: INTERNAL MEDICINE | Facility: CLINIC | Age: 38
End: 2023-01-24
Payer: COMMERCIAL

## 2023-01-24 DIAGNOSIS — U07.1 INFECTION DUE TO 2019 NOVEL CORONAVIRUS: Primary | ICD-10-CM

## 2023-01-24 PROCEDURE — 99213 OFFICE O/P EST LOW 20 MIN: CPT | Mod: 95 | Performed by: INTERNAL MEDICINE

## 2023-01-24 NOTE — PATIENT INSTRUCTIONS
Thank you for visiting the [facility] today at the [facility]! The following is some information about our clinic:     Primary Care Center Frequently-Asked Questions    (1) How do I schedule appointments at the [facility]?     Primary Care--to schedule or make changes to an existing appointment, please call our primary care line at [phone].    Labs--to schedule a lab appointment at the [facility] you can use [employer] or call [phone]. If you have a [place] location that is closer to home, you can reach out to that location for scheduling options.     Imaging--if you need to schedule a CT, X-ray, MRI, ultrasound, or other imaging study you can call [phone] to schedule at the [facility] or any other [facility] imaging location.     Referrals--if a referral to another specialty was ordered you can expect a phone call from their scheduling team. If you have not heard from them in a week, please call us or send us a [employer] message to check the status or get a scheduling number. Please note that this only applies to internal [facility] referrals. If the referral is external you would need to contact their office for scheduling.     (2) I have a question about my visit, who do I contact?     You can call us at the primary care line at [phone] to ask questions about your visit. You can also send a secure message through [employer] which is reviewed by clinic staff. Please note that [employer] messages have a twenty-four to forty-eight business hour turnaround time and should not be used for urgent concerns.    (3) How will I get the results of my tests?    If you are signed up for [employer] all tests will be released to you within twenty-four hours of resulting. Please allow three to five days for your doctor to review your results and place a note interpreting the results. If you do not have [email or web address] you will receive your  results through mail seven to ten business days following the return of the tests. Please note that if there should be any urgent or concerning results that your doctor or their registered nurse will reach out to you the same day as the tests come back. If you have follow up questions about your results or would like to discuss the results in detail please schedule a follow up with your provider either in person or virtually.     (4) How do I get refills of my prescriptions?     You should always first contact your pharmacy for refills of your medications. If submitting a refill request on [employer], please be sure to submit the request only once--repeat requests can cause delays in refill. If you are requesting a NEW medication or a medication related to new symptoms you will need to schedule an appointment with a provider prior to approval. Please note: Routine medication refills have up to one to three business day turnaround whereas controlled substances refills have up to five to seven business day turnaround.    (5) I have new symptoms, what do I do?     If you are having an immediate medical emergency, you should dial 911 for assistance.   For anything urgent that needs to be seen within a few hours to one day you should visit a local urgent care for assistance.  For non-urgent symptoms that need to be seen within a few days to a week you can schedule with an available provider in primary care by going to [employer] or calling [phone].   If you are not sure how serious your symptoms are or you would like to receive medical advice you can always call [phone] to speak with a triage nurse.    COVID-19 Outpatient Treatments  Your care team can help you find the best treatments for COVID-19. Talk to a health care provider or refer to the FDA medicine fact sheets below.    Important: You can't have Paxlovid or molnupiravir if you're starting the medicine more than 5 days after your symptoms have  started.  Paxlovid: https://www.fda.gov/media/628833/download  Molnupiravir (Lagevrio): https://www.fda.gov/media/946743/download  Paxlovid (nimatrelvir and ritonavir)  How it works  Two medicines (nirmatrelvir and ritonavir) are taken together. They stop the virus from growing. Less amount of virus is easier for your body to fight.  Benefits  Lowers risk of a hospital stay or death from COVID-19.  How to take    Medicine comes in a daily container with both medicine tablets. Take by mouth twice daily (once in the morning, once at night) for 5 days.    The number of tablets to take varies by patient.    Don't chew or break capsules. Swallow whole.  When to take  Take as soon as possible after positive COVID-19 test result, and within 5 days of your first symptoms.  Who can take it  Patients must be 12 years or older, weigh at least 88 pounds, and have tested positive for COVID-19. Paxlovid is the preferred treatment for pregnant patients.  Possible side effects  Can cause altered sense of taste, diarrhea (loose, watery stools), high blood pressure, muscle aches.  Medicine conflicts    Some medicines may conflict with Paxlovid and may cause serious side effects.    Tell your care team about all the medicines you take, including prescription and over-the-counter medicines, vitamins, and herbal supplements.    Your care team will review your medicines to make sure that you can safely take Paxlovid.  Cautions    Paxlovid is not advised for patients with severe kidney or liver disease. If you have kidney or liver problems, the dose may need to be changed.    If you're pregnant or breastfeeding, talk to your care team about your options.    If you take hormonal birth control (such as the Pill), then you or your partner should also use a non-hormonal form of birth control (such as a condom). Keep doing this for 1 menstrual cycle after your last dose of Paxlovid.  Molnupiravir (Lagevrio)  How it works  Stops the virus from  growing. Less amount of virus is easier for your body to fight.  Benefits  Lowers risk of a hospital stay or death from COVID-19.  How to take  Take 4 capsules by mouth every 12 hours (4 in the morning and 4 at night) for 5 days. Don't chew or break capsules. Swallow whole.  When to take  Take as soon as possible after positive COVID-19 test result, and within 5 days of your first symptoms.  Who can take it  Patients must be 18 years or older and have tested positive for COVID-19.  Possible side effects  Diarrhea (loose, watery stools), nausea (feeling sick to your stomach), dizziness, headaches.  Medicine conflicts  Right now, there are no known conflicts with other drugs. But tell your care team about all medicines you take.  Cautions    This medicine is not advised for patients who are pregnant.    If you are someone who could become pregnant, use trusted birth control until 4 days after your last dose of molnupiravir.    If your partner could become pregnant, you should use trusted birth control until 3 months after your last dose of molnupiravir.  For informational purposes only. Not to replace the advice of your health care provider. Copyright   2022 [facility]. All rights reserved. Clinically reviewed by Tamiko Chavira, PharmD, BCACP. [employer] 519787 - REV 12/22.    Instructions for Patients  Your COVID-19 test was positive. This means you have the virus.     What treatments are available?  Over-the-counter medicines may help with your symptoms such as runny or stuffy nose, cough, chills, and fever. Talk to your care team about your options.     Some people are at high risk for severe illness (for example if you have a weak immune system, you're 65 or older, or you have certain medical problems). If your risk is high and your symptoms started in the last 5 to 7 days, we strongly recommend for you to get COVID treatment as soon as possible. Paxlovid and Molnupiravir are proven safe and  "effective, make you feel better faster, and prevent hospitalization and death.       How do I self-isolate?  You isolate when you have symptoms of COVID or a test shows you have COVID, even if you don t have symptoms.     If you DO have symptoms:  o Stay home and away from others  - For at least 5 days after your symptoms started, AND   - You are fever free for 24 hours (with no medicine that reduces fever), AND  - Your other symptoms are better.  o Wear a mask for 10 full days any time you are around others.    If you DON T have symptoms:  o Stay at home and away from others for at least 5 days after your positive test.  o Wear a mask for 10 full days any time you are around others.    You can schedule an appointment to discuss COVID treatment by requesting an appointment on [employer] by selecting \"schedule COVID-19 Treatment\" or by calling [employer] ([phone]).    What are the symptoms of COVID-19?  Symptoms can include fever, cough, shortness of breath, chills, headache, muscle pain sore throat, fatigue, runny or stuffy nose, and loss of taste and smell. Other less common symptoms include nausea, vomiting, or diarrhea (watery stools).    Know when to call 911. Emergency warning signs include:    Trouble breathing or shortness of breath    Pain or pressure in the chest that doesn't go away    Feeling confused like you haven't felt before, or not being able to wake up    Bluish-colored lips or face    How can I take care of myself?  4. Get lots of rest. Drink extra fluids (unless a doctor has told you not to).  5. Take Tylenol (acetaminophen) for fever or pain. If you have liver or kidney problems, ask your family doctor if it's okay to take Tylenol   Adults:   650 mg (two 325 mg pills or tablets) every 4 to 6 hours, or...   1,000 mg (two 500 mg pills or tablets) every 8 hours as needed.  Note: Don't take more than 3,000 mg in one day. Acetaminophen is found in many medicines (both prescribed and over the " counter). Read all labels to be sure you don't take too much.  For children, check the Tylenol bottle for the right dose. The dose is based on the child's age or weight.  6. Take over the counter medicines for your symptoms as needed. Talk to your pharmacist.  7. If you have other health problems (like cancer, heart failure, an organ transplant, or severe kidney disease): Call your specialty clinic if you don't feel better in the next 2 days.    Where can I get more information?     [employer] COVID-19 Resource Hub: www.[employer].org/covid19/     CDC Quarantine & Isolation: https://www.cdc.gov/coronavirus/2019-ncov/your-health/quarantine-isolation.html     CDC - What to Do If You're Sick: https://www.cdc.gov/coronavirus/2019-ncov/if-you-are-sick/index.html

## 2023-01-24 NOTE — PROGRESS NOTES
Margarito is a 37 year old who is being evaluated via a billable video visit.      How would you like to obtain your AVS? MyChart  If the video visit is dropped, the invitation should be resent by: Text to cell phone: [phone]  Will anyone else be joining your video visit? No        Allyson Johnson VF            Assessment & Plan     Infection due to 2019 novel coronavirus    Margarito presents with 2 days of COVID symptoms with positive home test.  He is higher risk due to history of Remicade therapy.  He has had slightly elevated ALT but no significant liver disease history.  He does not have a history of kidney issues.  Discussed Paxlovid as most effective therapy, and there should be no significant medication interactions with those on his list.  Discussed isolation parameters.  Follow-up as needed if not improving in a week or new/worsening symptoms develop.        - nirmatrelvir and ritonavir (PAXLOVID) therapy pack; Take 3 tablets by mouth 2 times daily for 5 days (Take 2 Nirmatrelvir tablets and 1 Ritonavir tablet twice daily for 5 days)       Homer Gomez MD  [facility] INTERNAL MEDICINE [facility]   Margarito is a 37 year old, presenting for the following health issues:  COVID    HPI       Acute Illness  Acute illness concerns:  COVID with positive home test   Onset/Duration: two days ago  Symptoms:  Fever: No- normal temp  Chills/Sweats: YES- chills  Headache (location?): YES- mild  Sinus Pressure: YES  Conjunctivitis:  chronic  Ear Pain: no  Rhinorrhea: YES- mild  Congestion: YES  Sore Throat: No  Cough: YES- occasionally productive.  No shortness of breath  Wheeze: No  Decreased Appetite: No  Nausea: No  Vomiting: No  Diarrhea: No  Fatigue/Achiness: YES- both  Sick/Strep Exposure: No  Therapies tried and outcome: Dayquil is helping    Margarito is on Remicade for HS.      Review of Systems   Constitutional, HEENT, pulmonary, gi systems are negative, except as otherwise  noted.      Objective    Vitals - Patient Reported  Weight (Patient Reported): 158.8 kg (350 lb)  Height (Patient Reported): 182.9 cm (6')  BMI (Based on Pt Reported Ht/Wt): 47.47  Pain Loc: Other - see comment (soreness)      Vitals:  No vitals were obtained today due to virtual visit.    Physical Exam   GENERAL: alert, no distress and sounds somewhat congested  EYES: Eyes grossly normal to inspection.  No discharge or erythema, or obvious scleral/conjunctival abnormalities.  RESP: No audible wheeze, cough, or visible cyanosis.  No visible retractions or increased work of breathing.    SKIN: Visible skin clear. No significant rash, abnormal pigmentation or lesions.  NEURO: Cranial nerves grossly intact.  Mentation and speech appropriate for age.  PSYCH: Mentation appears normal, affect normal/bright, judgement and insight intact, normal speech and appearance well-groomed.            Video-Visit Details    Type of service:  Video Visit   Start time: 10:51am  End time: 10:56am    Originating Location (pt. Location): Home  Distant Location (provider location):  Off-site  Platform used for Video Visit: Erin

## 2023-03-17 ENCOUNTER — DOCUMENTATION ONLY (OUTPATIENT)
Dept: PHARMACY | Facility: CLINIC | Age: 38
End: 2023-03-17
Payer: COMMERCIAL

## 2023-03-17 NOTE — PROGRESS NOTES
Skilled Nurse visit in the [facility]fusion [facility] to administer Inflectra 800 mg IV. No recent elevated temperature, fever, chills, productive cough, coughing for 3 weeks or longer or hemoptysis, abnormal vital signs, night sweats, chest pain. No decrease in appetite, unexplained weight loss or fatigue.  No other new onset medical symptoms.  Current weight 346 lbs.  PIV placed in left hand, 3 attempts.  Pre medicated with Tylenol 650 mg po and Benadryl 50 mg po. Labs drawn pre-infusion: none.  Infusion completed without complication or reaction. Pt reports therapy is effective in helping to manage symptoms related to therapy.     Yakelin Huber, CONCEPCIONN, RN  [phone]  [email or web address]

## 2023-05-01 ENCOUNTER — LAB (OUTPATIENT)
Dept: LAB | Facility: CLINIC | Age: 38
End: 2023-05-01
Payer: COMMERCIAL

## 2023-05-01 DIAGNOSIS — Z79.899 ENCOUNTER FOR LONG-TERM (CURRENT) USE OF HIGH-RISK MEDICATION: ICD-10-CM

## 2023-05-01 DIAGNOSIS — Z87.898 HISTORY OF PREDIABETES: ICD-10-CM

## 2023-05-01 LAB
ALBUMIN SERPL BCG-MCNC: 4.3 G/DL (ref 3.5–5.2)
ALP SERPL-CCNC: 104 U/L (ref 40–129)
ALT SERPL W P-5'-P-CCNC: 32 U/L (ref 10–50)
ANION GAP SERPL CALCULATED.3IONS-SCNC: 11 MMOL/L (ref 7–15)
AST SERPL W P-5'-P-CCNC: 24 U/L (ref 10–50)
BASOPHILS # BLD AUTO: 0.1 10E3/UL (ref 0–0.2)
BASOPHILS NFR BLD AUTO: 1 %
BILIRUB SERPL-MCNC: 0.4 MG/DL
BUN SERPL-MCNC: 10.1 MG/DL (ref 6–20)
CALCIUM SERPL-MCNC: 9.5 MG/DL (ref 8.6–10)
CHLORIDE SERPL-SCNC: 98 MMOL/L (ref 98–107)
CHOLEST SERPL-MCNC: 157 MG/DL
CREAT SERPL-MCNC: 1.02 MG/DL (ref 0.67–1.17)
DEPRECATED HCO3 PLAS-SCNC: 28 MMOL/L (ref 22–29)
EOSINOPHIL # BLD AUTO: 0.3 10E3/UL (ref 0–0.7)
EOSINOPHIL NFR BLD AUTO: 3 %
ERYTHROCYTE [DISTWIDTH] IN BLOOD BY AUTOMATED COUNT: 13.2 % (ref 10–15)
GFR SERPL CREATININE-BSD FRML MDRD: >90 ML/MIN/1.73M2
GLUCOSE SERPL-MCNC: 105 MG/DL (ref 70–99)
HBA1C MFR BLD: 6.5 %
HCT VFR BLD AUTO: 47 % (ref 40–53)
HDLC SERPL-MCNC: 41 MG/DL
HGB BLD-MCNC: 15.6 G/DL (ref 13.3–17.7)
HOLD SPECIMEN: NORMAL
IMM GRANULOCYTES # BLD: 0.1 10E3/UL
IMM GRANULOCYTES NFR BLD: 1 %
LDLC SERPL CALC-MCNC: 87 MG/DL
LYMPHOCYTES # BLD AUTO: 4.5 10E3/UL (ref 0.8–5.3)
LYMPHOCYTES NFR BLD AUTO: 34 %
MCH RBC QN AUTO: 28.9 PG (ref 26.5–33)
MCHC RBC AUTO-ENTMCNC: 33.2 G/DL (ref 31.5–36.5)
MCV RBC AUTO: 87 FL (ref 78–100)
MONOCYTES # BLD AUTO: 0.9 10E3/UL (ref 0–1.3)
MONOCYTES NFR BLD AUTO: 7 %
NEUTROPHILS # BLD AUTO: 7.4 10E3/UL (ref 1.6–8.3)
NEUTROPHILS NFR BLD AUTO: 54 %
NONHDLC SERPL-MCNC: 116 MG/DL
NRBC # BLD AUTO: 0 10E3/UL
NRBC BLD AUTO-RTO: 0 /100
PLATELET # BLD AUTO: 353 10E3/UL (ref 150–450)
POTASSIUM SERPL-SCNC: 4.3 MMOL/L (ref 3.4–5.3)
PROT SERPL-MCNC: 9 G/DL (ref 6.4–8.3)
RBC # BLD AUTO: 5.39 10E6/UL (ref 4.4–5.9)
SODIUM SERPL-SCNC: 137 MMOL/L (ref 136–145)
TRIGL SERPL-MCNC: 145 MG/DL
TSH SERPL DL<=0.005 MIU/L-ACNC: 1.93 UIU/ML (ref 0.3–4.2)
WBC # BLD AUTO: 13.3 10E3/UL (ref 4–11)

## 2023-05-01 PROCEDURE — 83036 HEMOGLOBIN GLYCOSYLATED A1C: CPT | Mod: 90 | Performed by: PATHOLOGY

## 2023-05-01 PROCEDURE — 80061 LIPID PANEL: CPT | Performed by: PATHOLOGY

## 2023-05-01 PROCEDURE — 80050 GENERAL HEALTH PANEL: CPT | Performed by: PATHOLOGY

## 2023-05-01 PROCEDURE — 86481 TB AG RESPONSE T-CELL SUSP: CPT | Mod: 90 | Performed by: PATHOLOGY

## 2023-05-01 PROCEDURE — 99000 SPECIMEN HANDLING OFFICE-LAB: CPT | Performed by: PATHOLOGY

## 2023-05-01 PROCEDURE — 36415 COLL VENOUS BLD VENIPUNCTURE: CPT | Performed by: PATHOLOGY

## 2023-05-03 ENCOUNTER — OFFICE VISIT (OUTPATIENT)
Dept: INTERNAL MEDICINE | Facility: CLINIC | Age: 38
End: 2023-05-03
Payer: COMMERCIAL

## 2023-05-03 ENCOUNTER — TELEPHONE (OUTPATIENT)
Dept: INTERNAL MEDICINE | Facility: CLINIC | Age: 38
End: 2023-05-03

## 2023-05-03 VITALS
SYSTOLIC BLOOD PRESSURE: 132 MMHG | WEIGHT: 315 LBS | OXYGEN SATURATION: 99 % | HEIGHT: 72 IN | BODY MASS INDEX: 42.66 KG/M2 | DIASTOLIC BLOOD PRESSURE: 77 MMHG | HEART RATE: 96 BPM

## 2023-05-03 DIAGNOSIS — E11.9 TYPE 2 DIABETES MELLITUS WITHOUT COMPLICATION, WITHOUT LONG-TERM CURRENT USE OF INSULIN (H): Primary | ICD-10-CM

## 2023-05-03 DIAGNOSIS — E11.9 TYPE 2 DIABETES MELLITUS WITHOUT COMPLICATION, WITHOUT LONG-TERM CURRENT USE OF INSULIN (H): ICD-10-CM

## 2023-05-03 DIAGNOSIS — F43.22 ADJUSTMENT DISORDER WITH ANXIOUS MOOD: ICD-10-CM

## 2023-05-03 LAB
GAMMA INTERFERON BACKGROUND BLD IA-ACNC: 0.04 IU/ML
M TB IFN-G BLD-IMP: NEGATIVE
M TB IFN-G CD4+ BCKGRND COR BLD-ACNC: 9.96 IU/ML
MITOGEN IGNF BCKGRD COR BLD-ACNC: 0 IU/ML
MITOGEN IGNF BCKGRD COR BLD-ACNC: 0.01 IU/ML
QUANTIFERON MITOGEN: 10 IU/ML
QUANTIFERON NIL TUBE: 0.04 IU/ML
QUANTIFERON TB1 TUBE: 0.05 IU/ML
QUANTIFERON TB2 TUBE: 0.04

## 2023-05-03 PROCEDURE — 99395 PREV VISIT EST AGE 18-39: CPT | Performed by: INTERNAL MEDICINE

## 2023-05-03 RX ORDER — HYDROXYZINE HYDROCHLORIDE 25 MG/1
25 TABLET, FILM COATED ORAL 3 TIMES DAILY PRN
Qty: 30 TABLET | Refills: 3 | Status: SHIPPED | OUTPATIENT
Start: 2023-05-03

## 2023-05-03 NOTE — PROGRESS NOTES
"Margarito is a very pleasant 37 year old man coming in for a 6 month follow up.  He was also hoping to obtain a yearly physical, since it has been awhile.    Continues on remicade infusions every 8 weeks for his hidradenitis supparativa, and reports it is well controlled. He had left axilla surgery, replacing almost the entire armpit with skin grafts two years ago. Will be removing excess/overgrown skin soon.     Margarito is still struggling with anxiety and is unsure if fluoxetine is working. He is also having trouble with concentration. He feels as though this is helpful but not as effective as it could be. We discussed increasing his dosage as well as hydroxyzine to be used PRN. Margarito hasn't scheduled an appointment with a therapist but intends to do so. He previously saw one in his home state of [place] and will make use of my previous referral soon.     His A1c is just hitting the diabetic range with fasting glucose of 105 and A1c of 6.5. Margarito has a family Hx of DM, and has been advised he is at risk in the past. He has been working with a nutritionist and has recently lost 30 pounds. We discussed metformin as well as semaglutide/tirzepatide. He expressed interest in these GLP-1 agonist medications, and was also advised to continue exercise regularly which he already does 3x a week.    Margarito has been told by 3rd parties that he has sleep apnea as they have seen him stop breathing while he sleeps. He has never been tested and recommended to take the home sleep apnea test.     No changes to past, family or social history and  ROS: 10 point ROS neg other than the symptoms noted above in the HPI.    Exam:   /77 (BP Location: Right arm, Patient Position: Sitting, Cuff Size: Adult Large)   Pulse 96   Ht 1.829 m (6' 0.01\")   Wt (!) 153 kg (337 lb 4.8 oz)   SpO2 99%   BMI 45.74 kg/m    Constitutional: no distress, comfortable, pleasant   Eyes: anicteric, normal extra-ocular movements   Ears, Nose and " Throat: tympanic membranes clear, neck supple with full range of motion, no thyromegaly.   Cardiovascular: regular rate and rhythm, normal S1 and S2, no murmurs, rubs or gallops, peripheral pulses full and symmetric   Respiratory: clear to auscultation, no wheezes or crackles, normal breath sounds   Gastrointestinal: positive bowel sounds, nontender, no hepatosplenomegaly, no masses   Musculoskeletal: knees full range of motion, no effusion  Skin: no concerning lesions, no jaundice   Neurological:  normal gait, no tremor   Psychological: appropriate mood   Lymphatic: no cervical lymphadenopathy and no pedal edema      A/P Margarito was seen today for physical and follow up DM.  We discussed upcoming cancer screening for both colon and prostate based on guidelines.  He is not due for any other routine HCM.    Type 2 diabetes mellitus without complication, without long-term current use of insulin (H)  -     Semaglutide-Weight Management (WEGOVY) 0.25 MG/0.5ML pen; Inject 0.25 mg Subcutaneous once a week After four weeks, increase to 0.5 mg weekly    BMI 45.0-49.9, adult (H)  -     Semaglutide-Weight Management (WEGOVY) 0.25 MG/0.5ML pen; Inject 0.25 mg Subcutaneous once a week After four weeks, increase to 0.5 mg weekly  -     Adult Sleep Eval & Management Referral; Future    Adjustment disorder with anxious mood  -     FLUoxetine (PROZAC) 20 MG capsule; Take 1 capsule (20 mg) by mouth daily  -     hydrOXYzine (ATARAX) 25 MG tablet; Take 1 tablet (25 mg) by mouth 3 times daily as needed for anxiety    Recommended to RTC via video appointment in 6 weeks for check in after medication changes    Carina Gallagher MD    Scribe Disclosure:   I, Boaz Riddle, am serving as a scribe; to document services personally performed by Dr. Carina Gallagher- -based on data collection and the provider's statements to me.     Provider Disclosure:  I agree with above History, Review of Systems, Physical exam and Plan.  I have  reviewed the content of the documentation and have edited it as needed. I have personally performed the services documented here and the documentation accurately represents those services and the decisions I have made.      Electronically signed by:  Dr. Carina Gallagher

## 2023-05-03 NOTE — NURSING NOTE
"Margarito Sofia is a 37 year old male patient that presents today in clinic for the following:    Chief Complaint   Patient presents with     Physical     Pt here for physical and would like to discuss anxiety medication issues.      The patient's allergies and medications were reviewed as noted. A set of vitals were recorded as noted without incident: /77 (BP Location: Right arm, Patient Position: Sitting, Cuff Size: Adult Large)   Pulse 96   Ht 1.829 m (6' 0.01\")   Wt (!) 153 kg (337 lb 4.8 oz)   SpO2 99%   BMI 45.74 kg/m  . The patient does not have any other questions for the provider.    Kayleigh Iqbal, EMT at 1:17 PM on 5/3/2023  "

## 2023-05-04 ENCOUNTER — MYC MEDICAL ADVICE (OUTPATIENT)
Dept: INTERNAL MEDICINE | Facility: CLINIC | Age: 38
End: 2023-05-04
Payer: COMMERCIAL

## 2023-05-04 DIAGNOSIS — E11.9 TYPE 2 DIABETES MELLITUS WITHOUT COMPLICATION, WITHOUT LONG-TERM CURRENT USE OF INSULIN (H): Primary | ICD-10-CM

## 2023-05-04 RX ORDER — SEMAGLUTIDE 0.25 MG/.5ML
0.25 INJECTION, SOLUTION SUBCUTANEOUS WEEKLY
Qty: 2 ML | Refills: 0 | Status: SHIPPED | OUTPATIENT
Start: 2023-05-04 | End: 2023-05-24

## 2023-05-24 DIAGNOSIS — E11.9 TYPE 2 DIABETES MELLITUS WITHOUT COMPLICATION, WITHOUT LONG-TERM CURRENT USE OF INSULIN (H): ICD-10-CM

## 2023-05-24 RX ORDER — SEMAGLUTIDE 0.68 MG/ML
0.25 INJECTION, SOLUTION SUBCUTANEOUS WEEKLY
Qty: 3 ML | Refills: 3 | Status: SHIPPED | OUTPATIENT
Start: 2023-05-24 | End: 2024-02-05

## 2023-05-24 NOTE — TELEPHONE ENCOUNTER
Changed Wegovy to Ozempic as requested by PCP    Gumaro Aguiar, Pharm. D., BCACP  Medication Therapy Management Pharmacist  Direct Voicemail: [phone]

## 2023-05-26 ENCOUNTER — TELEPHONE (OUTPATIENT)
Dept: INTERNAL MEDICINE | Facility: CLINIC | Age: 38
End: 2023-05-26
Payer: COMMERCIAL

## 2023-05-26 RX ORDER — SEMAGLUTIDE 0.68 MG/ML
0.25 INJECTION, SOLUTION SUBCUTANEOUS WEEKLY
Refills: 3 | OUTPATIENT
Start: 2023-05-26

## 2023-05-31 NOTE — TELEPHONE ENCOUNTER
There is already an active P/A on file that expires 05/25/2024.            Medication: OZEMPIC (0.25 OR 0.5 MG/DOSE) 2 MG/3ML SC SOPN  Authorization Effective Date: 5/25/2023  Authorization Expiration Date: 5/25/2024  Approved Dose/Quantity: 3ml per 84 days  Reference #:     Insurance Company: [employer] - Phone [phone] Fax [phone]  Expected CoPay:       CoPay Card Available:      Financial Assistance Needed:   Which Pharmacy is filling the prescription: [id number] - [facility] [place], [place] - [address].  Pharmacy Notified: Yes  Patient Notified: No

## 2023-05-31 NOTE — TELEPHONE ENCOUNTER
Central Prior Authorization Team   Phone: [phone]      PA Initiation    Medication: OZEMPIC (0.25 OR 0.5 MG/DOSE) [facility]  Insurance Company: [employer] - Phone [phone] Fax [phone]  Pharmacy Filling the Rx: [id number] - [address].  Filling Pharmacy Phone: [phone]  Filling Pharmacy Fax:    Start Date: 5/31/2023

## 2023-06-15 NOTE — PROGRESS NOTES
[facility] Dermatology Note  Encounter Date: Jul 13, 2023  Office Visit     Dermatology Problem List:  FBSE 6/2/22  1. Hidradenitis suppurativa.  - Pt is enrolled in HS registry  - Current recommended tx: infliximab [id number] with 40mg methylpred, ILK to two sites 3/4/21, referred to plastics 10/7/2021   - Labs done 8/2020  - Past tx: has been on ABx for acne (incl Bactrim), clindamycin, rifampin, prednisone, bactrim, methotrexate 15mg (discontinued Summer 2020 due to elevated LFTs)  - Gained a lot of weight recently will hold steroids  - Benzoyl peroxide wash  - If neck lesion still acting up next time will plan for excision     2. Acne vulgaris  - Has tried Accutane and a number of antibiotics     3. Hx of atypical nevus - ~2005 - removed in [place] -patient will bring us records at next visit  - TBSE next in person visit     4. Pilonidal cyst. Referred to colorectal surgery     SH:      ____________________________________________    Assessment & Plan:  # Hidradenitis suppurativa, Rashaun stage II.  - s/p surgery in rt axilla and neck, healing well. Some activity in left axilla. Patient will follow-up with Dr. Ivy for this and to discuss excision in R axilla.   - Continue bactrim DS. Refills provided.   - Continue BP wash   - Continue infliximab 5mg/kg  - Following with nutrition  - Referred to PCP to discuss vaccines: recommended shingles, pneumonia, COVID booster    # Pilonidal cyst  - Referred to colorectal surgery     # Psoriasiform eruption, b/l inguinal folds  - Continue applying desonide cream twice daily as needed to the affected areas      # Hx of atypical nevus   - Encouraged him to bring in prior records  - Need TBSE next in person visit     Follow-up: 3 month(s) or earlier for new or changing lesions    Staff and Medical Student:     Raul Jones, MS4    Staff Physician Comments:   I saw and evaluated the patient with the resident and I agree with the  assessment and plan.  I was present for the examination.    Dennis Denny MD, FAAD    Departments of Internal Medicine and Dermatology  [facility]  [phone]    ____________________________________________    CC: Follow Up (HS follow up, stable. New area at base of spine. )    HPI:  Mr. Margarito Sofia is a(n) 37 year old male who presents today as a return patient for HS. Last seen June 2, 2022 by myself.     Patient reports that he is doing well.  He says that there is some drainage to previously involved areas, but is feeling like his disease is generally well controlled.  He does note a new area involvement above his gluteal cleft.  He is still satisfied with the infliximab infusions and is not currently experiencing any side effects.     Additionally, patient reports that he recently started Ozempic and had a successful course with nutrition and has lost approximately 20 to 30 pounds.  He says that he initially experienced some nausea and GI upset on the Ozempic, but is currently not experiencing any effects.  He says that the nutrition consult was very helpful especially in terms of portion control and figuring out that he was eating too many carbs.  He feels confident in his ability to continue his weight loss and nutrition journey and understands the role of his nutrition and obesity in his HS.     Patient is otherwise feeling well, without additional skin concerns.    Labs Reviewed:  Quant Gold TB negative as of 5/1/2023.    Physical Exam:  Vitals: /89 (BP Location: Right arm, Patient Position: Chair, Cuff Size: Adult Large)   Pulse 90   Wt (!) 153.1 kg (337 lb 8 oz)   BMI 45.76 kg/m    SKIN: Total skin excluding the undergarment areas was performed. The exam included the head/face, neck, both arms, chest, back, abdomen, both legs, digits and/or nails.     HS Data      10/7/2021     2:16 PM 6/2/2022     5:07 PM 7/13/2023     1:40 PM   HS Exam Data   LC Type LC1   LC1   Clinical Subtypes Regular type  Regular type   Acne? Yes No Yes   Dissecting Cellulitis? No No Yes   Visual analogue score (0-100)   30   Total Rodney Stage II II    Total Inflammatory Nodules 9 3 6   Total Abcesses 0 0 0   Total Draining Tunnels 4 1 2   Total Abscess and Nodule Count 9 3 6   IHS4 Score  25 7 14   Total  HASI Score 47 21 36   HS-PGA 5  3       Comments:    - 1 atrophic, non-tender hypopigmented scar on the posterior neck  - 1 dark red, non-tender inflammatory papule on an erythematous base on the right posterior neck  - 3 inflammatory nodules and 3 non-draining, linear tunnels in the left axilla  - Diffuse post-inflammatory hyperpigmentation and scarring in the left axilla  - 1 inflammatory nodule and 1 draining tunnel on the chest  - 1 red/pink 2.0 cm x 2.3 cm abscess above the gluteal cleft   - No other lesions of concern on areas examined.     Medications:  Current Outpatient Medications   Medication    acetaminophen (TYLENOL) 325 MG tablet    benzoyl peroxide (PANOXYL) 10 % external liquid    desonide (DESOWEN) 0.05 % external cream    EPINEPHrine (ANY BX GENERIC EQUIV) 0.3 MG/0.3ML injection 2-pack    FLUoxetine (PROZAC) 20 MG capsule    hydrOXYzine (ATARAX) 25 MG tablet    inFLIXimab (REMICADE) 100 MG injection    semaglutide (OZEMPIC, 0.25 OR 0.5 MG/DOSE,) 2 MG/3ML pen    sulfamethoxazole-trimethoprim (BACTRIM DS) 800-160 MG tablet    Semaglutide, 1 MG/DOSE, (OZEMPIC) 4 MG/3ML pen     Current Facility-Administered Medications   Medication    lidocaine 1% with EPINEPHrine 1:100,000 injection 3 mL    triamcinolone acetonide (KENALOG-10) injection 10 mg      Past Medical History:   Patient Active Problem List   Diagnosis    Hidradenitis suppurativa    Morbid obesity (H)    Diabetes mellitus, type 2 (H)     Past Medical History:   Diagnosis Date    Hidradenitis suppurativa     Morbid obesity (H)         CC No referring provider defined for this encounter. on close of this encounter.

## 2023-06-26 ENCOUNTER — VIRTUAL VISIT (OUTPATIENT)
Dept: INTERNAL MEDICINE | Facility: CLINIC | Age: 38
End: 2023-06-26
Payer: COMMERCIAL

## 2023-06-26 ENCOUNTER — TELEPHONE (OUTPATIENT)
Dept: INTERNAL MEDICINE | Facility: CLINIC | Age: 38
End: 2023-06-26

## 2023-06-26 DIAGNOSIS — E11.9 TYPE 2 DIABETES MELLITUS WITHOUT COMPLICATION, WITHOUT LONG-TERM CURRENT USE OF INSULIN (H): Primary | ICD-10-CM

## 2023-06-26 DIAGNOSIS — R41.840 INATTENTION: ICD-10-CM

## 2023-06-26 PROCEDURE — 99214 OFFICE O/P EST MOD 30 MIN: CPT | Mod: VID | Performed by: INTERNAL MEDICINE

## 2023-06-26 NOTE — PROGRESS NOTES
Margarito is a very pleasant 37 year old male here via video visit to follow up on DM, obesity, hidradenitis supparativa.     Started semaglutide three weeks ago, no side effects noted. Denies nausea.  Losing some weight, going to the gym three times a week.    Does report mental focus isn't very good; mind tends to wander, doesn't finish tasks.  This is despite going up on prozac to treat anxiety.  Discuss ADHD assessment and he is willing to do that.    Infliximab is managing hidradenitis fairly well. No recent flares.  Will have one additional surgery on the left arm after skin grafting procedure; removing excess skin.    Current Outpatient Medications:      acetaminophen (TYLENOL) 325 MG tablet, Take 2 tablets (650 mg) by mouth every 4 hours as needed for other (For optimal non-opioid multimodal pain management to improve pain control.), Disp: 90 tablet, Rfl: 0     benzoyl peroxide (PANOXYL) 10 % external liquid, Use daily as directed, Disp: 148 mL, Rfl: 11     desonide (DESOWEN) 0.05 % external cream, Apply to rash in groin twice a day as needed for rash, Disp: 60 g, Rfl: 1     EPINEPHrine (ANY BX GENERIC EQUIV) 0.3 MG/0.3ML injection 2-pack, Inject 0.3 mLs (0.3 mg) into the muscle as needed for anaphylaxis May repeat one time in 5-15 minutes if response to initial dose is inadequate., Disp: 2 each, Rfl: 3     FLUoxetine (PROZAC) 10 MG capsule, Take 1 capsule (10 mg) by mouth daily, Disp: 90 capsule, Rfl: 3     FLUoxetine (PROZAC) 20 MG capsule, Take 1 capsule (20 mg) by mouth daily, Disp: 90 capsule, Rfl: 3     hydrOXYzine (ATARAX) 25 MG tablet, Take 1 tablet (25 mg) by mouth 3 times daily as needed for anxiety, Disp: 30 tablet, Rfl: 3     inFLIXimab (REMICADE) 100 MG injection, 700 mg every 28 days , Disp: , Rfl:      semaglutide (OZEMPIC, 0.25 OR 0.5 MG/DOSE,) 2 MG/3ML pen, Inject 0.25 mg Subcutaneous once a week For 4 weeks, Then increase to 0.5 mg once weekly thereafter., Disp: 3 mL, Rfl: 3      sulfamethoxazole-trimethoprim (BACTRIM DS) 800-160 MG tablet, Take 1 tablet by mouth 2 times daily, Disp: 180 tablet, Rfl: 1    A/P Margarito was seen today for recheck.  Doing well, we discussed uptitrating semaglutide to help facilitate weight loss as no side effects are noted.  Will repeat A1c in two months and refer to mental health for ADHD assessment.    Diagnoses and all orders for this visit:    Type 2 diabetes mellitus without complication, without long-term current use of insulin (H)  -     Hemoglobin A1c; Future  -     Semaglutide, 1 MG/DOSE, (OZEMPIC) 4 MG/3ML pen; Inject 1 mg Subcutaneous every 7 days After increasing to 0.5 mg for four weeks    Inattention  -     Adult Mental Health  Referral; Future    Carina Gallagher MD      Virtual Visit Details    Type of service:  Video Visit started 12:18 ended 12:33 with another 15 minutes chart review and documentation same day    Originating Location (pt. Location): Home    Distant Location (provider location):  On-site  Platform used for Video Visit: Erin

## 2023-06-26 NOTE — PATIENT INSTRUCTIONS
Thank you for visiting the Primary Care Center today at the [facility]! The following is some information about our clinic:     Primary Care Center Frequently-Asked Questions    (1) How do I schedule appointments at the [facility]?     Primary Care--to schedule or make changes to an existing appointment, please call our primary care line at [phone].    Labs--to schedule a lab appointment at the [facility] you can use [employer] or call [phone]. If you have a [place] location that is closer to home, you can reach out to that location for scheduling options.     Imaging--if you need to schedule a CT, X-ray, MRI, ultrasound, or other imaging study you can call [phone] to schedule at the [facility] or any other [facility] imaging location.     Referrals--if a referral to another specialty was ordered you can expect a phone call from their scheduling team. If you have not heard from them in a week, please call us or send us a Skip Hop message to check the status or get a scheduling number. Please note that this only applies to internal [facility] referrals. If the referral is external you would need to contact their office for scheduling.     (2) I have a question about my visit, who do I contact?     You can call us at the primary care line at [phone] to ask questions about your visit. You can also send a secure message through Skip Hop, which is reviewed by clinic staff. Please note that [employer] messages have a twenty-four to forty-eight business hour turnaround time and should not be used for urgent concerns.    (3) How will I get the results of my tests?    If you are signed up for [employer] all tests will be released to you within twenty-four hours of resulting. Please allow three to five days for your doctor to review your results and place a note interpreting the results. If you do not have [employer] you will receive your  results through mail seven to ten business days following the return of the tests. Please note that if there should be any urgent or concerning results that your doctor or their registered nurse will reach out to you the same day as the tests come back. If you have follow up questions about your results or would like to discuss the results in detail please schedule a follow up with your provider either in person or virtually.     (4) How do I get refills of my prescriptions?     You should always first contact your pharmacy for refills of your medications. If submitting a refill request on [employer], please be sure to submit the request only once--repeat requests can cause delays in refill. If you are requesting a NEW medication or a medication related to new symptoms you will need to schedule an appointment with a provider prior to approval. Please note: Routine medication refills have up to one to three business day turnaround whereas controlled substances refills have up to five to seven business day turnaround.    (5) I have new symptoms, what do I do?     If you are having an immediate medical emergency, you should dial 911 for assistance.   For anything urgent that needs to be seen within a few hours to one day you should visit a local urgent care for assistance.  For non-urgent symptoms that need to be seen within a few days to a week you can schedule with an available provider in primary care by going to [place] or calling [phone].   If you are not sure how serious your symptoms are or you would like to receive medical advice you can always call [phone] to speak with a triage nurse.

## 2023-06-28 NOTE — PROGRESS NOTES
This is a recent snapshot of the patient's [facility] medical record.  For current drug dose and complete information and questions, call [phone] or In Basket pool, [facility] home infusion ([id number])  CSN Number:  [id number]

## 2023-07-13 ENCOUNTER — OFFICE VISIT (OUTPATIENT)
Dept: DERMATOLOGY | Facility: CLINIC | Age: 38
End: 2023-07-13
Payer: COMMERCIAL

## 2023-07-13 VITALS
SYSTOLIC BLOOD PRESSURE: 126 MMHG | DIASTOLIC BLOOD PRESSURE: 89 MMHG | BODY MASS INDEX: 45.76 KG/M2 | HEART RATE: 90 BPM | WEIGHT: 315 LBS

## 2023-07-13 DIAGNOSIS — L05.01 PILONIDAL ABSCESS: ICD-10-CM

## 2023-07-13 DIAGNOSIS — L73.2 HIDRADENITIS SUPPURATIVA: Primary | ICD-10-CM

## 2023-07-13 PROCEDURE — 99214 OFFICE O/P EST MOD 30 MIN: CPT | Mod: GC | Performed by: DERMATOLOGY

## 2023-07-13 RX ORDER — SULFAMETHOXAZOLE/TRIMETHOPRIM 800-160 MG
1 TABLET ORAL 2 TIMES DAILY
Qty: 180 TABLET | Refills: 1 | Status: SHIPPED | OUTPATIENT
Start: 2023-07-13 | End: 2024-05-09

## 2023-07-13 ASSESSMENT — PAIN SCALES - GENERAL: PAINLEVEL: NO PAIN (0)

## 2023-07-13 NOTE — NURSING NOTE
Chief Complaint   Patient presents with     Follow Up     HS follow up, stable. New area at base of spine.      Madlein PEÑALOZA CMA

## 2023-07-13 NOTE — LETTER
7/13/2023       RE: Margarito Sofia  [address]  [address]  [place] [address]     Dear Colleague,    Thank you for referring your patient, Margarito Sofia, to the [facility] [place] [facility]. Please see a copy of my visit note below.    [facility] Dermatology Note  Encounter Date: Jul 13, 2023  Office Visit     Dermatology Problem List:  FBSE 6/2/22  1. Hidradenitis suppurativa.  - Pt is enrolled in HS registry  - Current recommended tx: infliximab [id number] with 40mg methylpred, ILK to two sites 3/4/21, referred to plastics 10/7/2021   - Labs done 8/2020  - Past tx: has been on ABx for acne (incl Bactrim), clindamycin, rifampin, prednisone, bactrim, methotrexate 15mg (discontinued Summer 2020 due to elevated LFTs)  - Gained a lot of weight recently will hold steroids  - Benzoyl peroxide wash  - If neck lesion still acting up next time will plan for excision     2. Acne vulgaris  - Has tried Accutane and a number of antibiotics     3. Hx of atypical nevus - ~2005 - removed in [place] -patient will bring us records at next visit     4. Hyperhydrosis  - Glycopyrrolate 1mg BID     SH:      ____________________________________________     Assessment & Plan:  # Hidradenitis suppurativa, Rodney stage II.  - s/p surgery in rt axilla and neck, healing well. Some activity in left axilla. Patient will follow-up with Dr. Ivy for this and to discuss excision in R axilla.   - Continue bactrim DS. Refills provided.   - Continue BP wash   - Continue infliximab 5mg/kg  - Following with nutrition  - Referred to PCP to discuss vaccines: recommended shingles, pneumonia, COVID booster     # Psoriasiform eruption, b/l inguinal folds  - Continue applying desonide cream twice daily to the affected areas      # Hx of atypical nevus   - Encouraged him to bring in prior records    Procedures Performed:   None.      Follow-up: 3 month(s) or earlier for new or changing lesions    Staff and Medical Student:     Raul Karen, MS4    ***  ____________________________________________    CC: Follow Up (HS follow up, stable. New area at base of spine. )    HPI:  Mr. Margarito Sofia is a(n) 37 year old male who presents today as a return patient for HS. Last seen June 2, 2022 by myself.     Patient reports that he is doing well.  He says that there is some drainage to previously involved areas, but is feeling like his disease is generally well controlled.  He does note a new area involvement above his gluteal cleft.  He is still satisfied with the infliximab infusions and is not currently experiencing any side effects.     Additionally, patient reports that he recently started Ozempic and had a successful course with nutrition and has lost approximately 20 to 30 pounds.  He says that he initially experienced some nausea and GI upset on the Ozempic, but is currently not experiencing any effects.  He says that the nutrition consult was very helpful especially in terms of portion control and figuring out that he was eating too many carbs.  He feels confident in his ability to continue his weight loss and nutrition journey and understands the role of his nutrition and obesity in his HS.     Patient is otherwise feeling well, without additional skin concerns.    Labs Reviewed:  Quant Gold TB negative as of 5/1/2023.    Physical Exam:  Vitals: /89 (BP Location: Right arm, Patient Position: Chair, Cuff Size: Adult Large)   Pulse 90   Wt (!) 153.1 kg (337 lb 8 oz)   BMI 45.76 kg/m    SKIN: {kkSkinExam:[id number]}  - ***  - 1 atrophic, non-tender hypopigmented scar on the posterior neck  - 1 dark red, non-tender inflammatory papule on an erythematous base on the right posterior neck  - Diffuse post-inflammatory hyperpigmentation and scarring in the left axilla  - 2 non-draining, linear tunnels in the left axilla  - 1 red/pink  non-tender superficial nodule above the gluteal cleft   - No other lesions of concern on areas examined.     Medications:  Current Outpatient Medications   Medication     acetaminophen (TYLENOL) 325 MG tablet     benzoyl peroxide (PANOXYL) 10 % external liquid     desonide (DESOWEN) 0.05 % external cream     EPINEPHrine (ANY BX GENERIC EQUIV) 0.3 MG/0.3ML injection 2-pack     FLUoxetine (PROZAC) 20 MG capsule     hydrOXYzine (ATARAX) 25 MG tablet     inFLIXimab (REMICADE) 100 MG injection     semaglutide (OZEMPIC, 0.25 OR 0.5 MG/DOSE,) 2 MG/3ML pen     sulfamethoxazole-trimethoprim (BACTRIM DS) 800-160 MG tablet     Semaglutide, 1 MG/DOSE, (OZEMPIC) 4 MG/3ML pen     Current Facility-Administered Medications   Medication     lidocaine 1% with EPINEPHrine 1:100,000 injection 3 mL     triamcinolone acetonide (KENALOG-10) injection 10 mg      Past Medical History:   Patient Active Problem List   Diagnosis     Hidradenitis suppurativa     Morbid obesity (H)     Diabetes mellitus, type 2 (H)     Past Medical History:   Diagnosis Date     Hidradenitis suppurativa      Morbid obesity (H)         CC No referring provider defined for this encounter. on close of this encounter.      Again, thank you for allowing me to participate in the care of your patient.      Sincerely,    Dennis Denny MD

## 2023-07-13 NOTE — PATIENT INSTRUCTIONS
Talk to primary care about vaccines. Recommended vaccines for immunosuppression:  - pneumonia vaccine   - Shingrix    Check infliximab level with next infusion    Refilled bactrim    Referred to colorectal for pilonidal abscess

## 2023-07-24 ENCOUNTER — TELEPHONE (OUTPATIENT)
Dept: SURGERY | Facility: CLINIC | Age: 38
End: 2023-07-24
Payer: COMMERCIAL

## 2023-07-24 NOTE — TELEPHONE ENCOUNTER
LVM for scheduling referral for Colon & Rectal Surgery. New patient appt next available with any surgeon (Candice Erickson, Jesús, Laura, Sade, or Delfina).

## 2023-08-04 ENCOUNTER — DOCUMENTATION ONLY (OUTPATIENT)
Dept: PHARMACY | Facility: CLINIC | Age: 38
End: 2023-08-04
Payer: COMMERCIAL

## 2023-08-04 ENCOUNTER — LAB REQUISITION (OUTPATIENT)
Dept: LAB | Facility: CLINIC | Age: 38
End: 2023-08-04
Payer: COMMERCIAL

## 2023-08-04 DIAGNOSIS — L73.2 HIDRADENITIS SUPPURATIVA: ICD-10-CM

## 2023-08-04 LAB
HOLD SPECIMEN: NORMAL
Lab: NORMAL
PERFORMING LABORATORY: NORMAL
SPECIMEN STATUS: NORMAL
TEST NAME: NORMAL

## 2023-08-04 PROCEDURE — 84999 UNLISTED CHEMISTRY PROCEDURE: CPT | Performed by: DERMATOLOGY

## 2023-08-04 PROCEDURE — 82397 CHEMILUMINESCENT ASSAY: CPT | Performed by: DERMATOLOGY

## 2023-08-04 PROCEDURE — 80230 DRUG ASSAY INFLIXIMAB: CPT | Performed by: DERMATOLOGY

## 2023-08-04 NOTE — PROGRESS NOTES
Skilled Nurse visit in the [facility] Infusion Suite to administer Inflectra 800 mg IV. No recent elevated temperature, fever, chills, productive cough, coughing for 3 weeks or longer or hemoptysis, abnormal vital signs, night sweats, chest pain. No decrease in appetite, unexplained weight loss or fatigue.  No other new onset medical symptoms.  Current weight 327.6 lbs.  PIV placed in left mid FA via US, 1 attempt.  Pre medicated with Tylenol 650 mg po and Benadryl 50 mg po. Labs drawn pre-infusion: Infliximab level.  Infusion completed without complication or reaction. Pt reports therapy is effective in helping to manage most symptoms related to therapy.     CONCEPCION LevinN, RN  [phone]  [email or web address]        Additional Documentation

## 2023-08-07 LAB — MISCELLANEOUS TEST 1 (ARUP): NORMAL

## 2023-09-01 ENCOUNTER — LAB REQUISITION (OUTPATIENT)
Dept: LAB | Facility: CLINIC | Age: 38
End: 2023-09-01
Payer: COMMERCIAL

## 2023-09-01 ENCOUNTER — TELEPHONE (OUTPATIENT)
Dept: DERMATOLOGY | Facility: CLINIC | Age: 38
End: 2023-09-01

## 2023-09-01 DIAGNOSIS — L73.2 HIDRADENITIS SUPPURATIVA: ICD-10-CM

## 2023-09-01 PROCEDURE — 80230 DRUG ASSAY INFLIXIMAB: CPT | Performed by: DERMATOLOGY

## 2023-09-01 PROCEDURE — 82397 CHEMILUMINESCENT ASSAY: CPT | Performed by: DERMATOLOGY

## 2023-09-01 PROCEDURE — 84999 UNLISTED CHEMISTRY PROCEDURE: CPT | Performed by: DERMATOLOGY

## 2023-09-01 NOTE — TELEPHONE ENCOUNTER
Skilled Nurse visit in the [facility]fusion [facility] to administer Inflectra 700 mg.  No recent elevated temperature, fever, chills, productive cough, coughing for 3 weeks or longer or hemoptysis, abnormal vital signs, night sweats, chest pain. No  decrease in your appetite, unexplained weight loss or fatigue.  No other new onset medical symptoms.  Peripheral IVleft Hand, 3attempts Pre medicated with benadryl and tylenol. Labs drawn yes. Infusion completed without complication or reaction. Pt reports therapy iseffective in managing symptoms related to therapy.     Shagufta BRASHER RN, BSN  [facility]  [phone]

## 2023-09-04 PROBLEM — H52.10 MYOPIA: Status: ACTIVE | Noted: 2018-05-07

## 2023-09-04 PROBLEM — E66.01 MORBID OBESITY (H): Chronic | Status: ACTIVE | Noted: 2021-10-07

## 2023-09-04 PROBLEM — M25.40 SWELLING OF MULTIPLE JOINTS: Status: ACTIVE | Noted: 2019-05-01

## 2023-09-04 PROBLEM — H16.042 MARGINAL CORNEAL ULCER OF LEFT EYE: Status: ACTIVE | Noted: 2018-03-21

## 2023-09-04 PROBLEM — M25.40 SWELLING OF MULTIPLE JOINTS: Status: RESOLVED | Noted: 2019-05-01 | Resolved: 2023-09-04

## 2023-09-04 PROBLEM — H16.042 MARGINAL CORNEAL ULCER OF LEFT EYE: Status: RESOLVED | Noted: 2018-03-21 | Resolved: 2023-09-04

## 2023-09-04 PROBLEM — E11.9 DIABETES MELLITUS, TYPE 2 (H): Chronic | Status: ACTIVE | Noted: 2023-05-03

## 2023-09-04 LAB — MISCELLANEOUS TEST 1 (ARUP): NORMAL

## 2023-09-05 ENCOUNTER — VIRTUAL VISIT (OUTPATIENT)
Dept: SLEEP MEDICINE | Facility: CLINIC | Age: 38
End: 2023-09-05
Attending: INTERNAL MEDICINE
Payer: COMMERCIAL

## 2023-09-05 VITALS — BODY MASS INDEX: 42.66 KG/M2 | WEIGHT: 315 LBS | HEIGHT: 72 IN

## 2023-09-05 DIAGNOSIS — Z72.820 LACK OF ADEQUATE SLEEP: ICD-10-CM

## 2023-09-05 DIAGNOSIS — R53.83 MALAISE AND FATIGUE: ICD-10-CM

## 2023-09-05 DIAGNOSIS — G47.30 SLEEP APNEA, UNSPECIFIED TYPE: ICD-10-CM

## 2023-09-05 DIAGNOSIS — R06.89 DYSPNEA AND RESPIRATORY ABNORMALITY: Primary | ICD-10-CM

## 2023-09-05 DIAGNOSIS — R53.81 MALAISE AND FATIGUE: ICD-10-CM

## 2023-09-05 DIAGNOSIS — R06.00 DYSPNEA AND RESPIRATORY ABNORMALITY: Primary | ICD-10-CM

## 2023-09-05 PROCEDURE — 99244 OFF/OP CNSLTJ NEW/EST MOD 40: CPT | Mod: VID | Performed by: PHYSICIAN ASSISTANT

## 2023-09-05 ASSESSMENT — SLEEP AND FATIGUE QUESTIONNAIRES
HOW LIKELY ARE YOU TO NOD OFF OR FALL ASLEEP WHILE SITTING AND TALKING TO SOMEONE: WOULD NEVER DOZE
HOW LIKELY ARE YOU TO NOD OFF OR FALL ASLEEP WHILE WATCHING TV: HIGH CHANCE OF DOZING
HOW LIKELY ARE YOU TO NOD OFF OR FALL ASLEEP IN A CAR, WHILE STOPPED FOR A FEW MINUTES IN TRAFFIC: WOULD NEVER DOZE
HOW LIKELY ARE YOU TO NOD OFF OR FALL ASLEEP WHILE SITTING AND READING: SLIGHT CHANCE OF DOZING
HOW LIKELY ARE YOU TO NOD OFF OR FALL ASLEEP WHEN YOU ARE A PASSENGER IN A CAR FOR AN HOUR WITHOUT A BREAK: HIGH CHANCE OF DOZING
HOW LIKELY ARE YOU TO NOD OFF OR FALL ASLEEP WHILE SITTING QUIETLY AFTER LUNCH WITHOUT ALCOHOL: WOULD NEVER DOZE
HOW LIKELY ARE YOU TO NOD OFF OR FALL ASLEEP WHILE SITTING INACTIVE IN A PUBLIC PLACE: SLIGHT CHANCE OF DOZING
HOW LIKELY ARE YOU TO NOD OFF OR FALL ASLEEP WHILE LYING DOWN TO REST IN THE AFTERNOON WHEN CIRCUMSTANCES PERMIT: HIGH CHANCE OF DOZING

## 2023-09-05 ASSESSMENT — PAIN SCALES - GENERAL: PAINLEVEL: NO PAIN (0)

## 2023-09-05 NOTE — NURSING NOTE
Is the patient currently in the state of [place]? YES    Visit mode:VIDEO    If the visit is dropped, the patient can be reconnected by: VIDEO VISIT: Send to e-mail at: [email or web address]    Will anyone else be joining the visit? NO  (If patient encounters technical issues they should call [phone])    How would you like to obtain your AVS? MyChart    Are changes needed to the allergy or medication list? No    Reason for visit: Consult    Margarito ALBA

## 2023-09-05 NOTE — PATIENT INSTRUCTIONS
"          MY TREATMENT INFORMATION FOR SLEEP APNEA-  Margarito Sofia    DOCTOR : Sally Choi PA    Am I having a sleep study at a sleep center?  --->Due to normal delays, you will be contacted within 2-4 weeks to schedule    Am I having a home sleep study?  --->Watch the video for the device you are using:    -/drop off device-   https://www.crobo.com/watch?v=yGGFBdELGhk    -Disposable device sent out require phone/computer application-   https://www.crobo.com/watch?v=BCce_vbiwxE      Frequently asked questions:  1. What is Obstructive Sleep Apnea (NICKY)? NICKY is the most common type of sleep apnea. Apnea means, \"without breath.\"  Apnea is most often caused by narrowing or collapse of the upper airway as muscles relax during sleep.   Almost everyone has occasional apneas. Most people with sleep apnea have had brief interruptions at night frequently for many years.  The severity of sleep apnea is related to how frequent and severe the events are.   2. What are the consequences of NICKY? Symptoms include: feeling sleepy during the day, snoring loudly, gasping or stopping of breathing, trouble sleeping, and occasionally morning headaches or heartburn at night.  Sleepiness can be serious and even increase the risk of falling asleep while driving. Other health consequences may include development of high blood pressure and other cardiovascular disease in persons who are susceptible. Untreated NICKY  can contribute to heart disease, stroke and diabetes.   3. What are the treatment options? In most situations, sleep apnea is a lifelong disease that must be managed with daily therapy. Medications are not effective for sleep apnea and surgery is generally not considered until other therapies have been tried. Your treatment is your choice . Continuous Positive Airway (CPAP) works right away and is the therapy that is effective in nearly everyone. An oral device to hold your jaw forward is usually the next most " reliable option. Other options include postioning devices (to keep you off your back), weight loss, and surgery including a tongue pacing device. There is more detail about some of these options below.  4. Are my sleep studies covered by insurance? Although we will request verification of coverage, we advise you also check in advance of the study to ensure there is coverage.    Important tips for those choosing CPAP and similar devices  For new devices, sign up for device SHAWN to monitor your device for your followup visits  We encourage you to utilize the [employer] shawn or website (myAir web (resmed.com) ) to monitor your therapy progress and share the data with your healthcare team when you discuss your sleep apnea.                                                    Know your equipment:  CPAP is continuous positive airway pressure that prevents obstructive sleep apnea by keeping the throat from collapsing while you are sleeping. In most cases, the device is  smart  and can slowly self-adjusts if your throat collapses and keeps a record every day of how well you are treated-this information is available to you and your care team.  BPAP is bilevel positive airway pressure that keeps your throat open and also assists each breath with a pressure boost to maintain adequate breathing.  Special kinds of BPAP are used in patients who have inadequate breathing from lung or heart disease. In most cases, the device is  smart  and can slowly self-adjusts to assist breathing. Like CPAP, the device keeps a record of how well you are treated.  Your mask is your connection to the device. You get to choose what feels most comfortable and the staff will help to make sure if fits. Here: are some examples of the different masks that are available:       Key points to remember on your journey with sleep apnea:  Sleep study.  PAP devices often need to be adjusted during a sleep study to show that they are effective and adjusted  right.  Good tips to remember: Try wearing just the mask during a quiet time during the day so your body adapts to wearing it. A humidifier is recommended for comfort in most cases to prevent drying of your nose and throat. Allergy medication from your provider may help you if you are having nasal congestion.  Getting settled-in. It takes more than one night for most of us to get used to wearing a mask. Try wearing just the mask during a quiet time during the day so your body adapts to wearing it. A humidifier is recommended for comfort in most cases. Our team will work with you carefully on the first day and will be in contact within 4 days and again at 2 and 4 weeks for advice and remote device adjustments. Your therapy is evaluated by the device each day.   Use it every night. The more you are able to sleep naturally for 7-8 hours, the more likely you will have good sleep and to prevent health risks or symptoms from sleep apnea. Even if you use it 4 hours it helps. Occasionally all of us are unable to use a medical therapy, in sleep apnea, it is not dangerous to miss one night.   Communicate. Call our skilled team on the number provided on the first day if your visit for problems that make it difficult to wear the device. Over 2 out of 3 patients can learn to wear the device long-term with help from our team. Remember to call our team or your sleep providers if you are unable to wear the device as we may have other solutions for those who cannot adapt to mask CPAP therapy. It is recommended that you sleep your sleep provider within the first 3 months and yearly after that if you are not having problems.   Use it for your health. We encourage use of CPAP masks during daytime quiet periods to allow your face and brain to adapt to the sensation of CPAP so that it will be a more natural sensation to awaken to at night or during naps. This can be very useful during the first few weeks or months of adapting to CPAP  though it does not help medically to wear CPAP during wakefulness and  should not be used as a strategy just to meet guidelines.  Take care of your equipment. Make sure you clean your mask and tubing using directions every day and that your filter and mask are replaced as recommended or if they are not working.     BESIDES CPAP, WHAT OTHER THERAPIES ARE THERE?    Positioning Device  Positioning devices are generally used when sleep apnea is mild and only occurs on your back.This example shows a pillow that straps around the waist. It may be appropriate for those whose sleep study shows milder sleep apnea that occurs primarily when lying flat on one's back. Preliminary studies have shown benefit but effectiveness at home may need to be verified by a home sleep test. These devices are generally not covered by medical insurance.  Examples of devices that maintain sleeping on the back to prevent snoring and mild sleep apnea.    Belt type body positioner  http://TechLive/    Electronic reminder  http://nightshifttherapy.Kaixin001/            Oral Appliance  What is oral appliance therapy?  An oral appliance device fits on your teeth at night like a retainer used after having braces. The device is made by a specialized dentist and requires several visits over 1-2 months before a manufactured device is made to fit your teeth and is adjusted to prevent your sleep apnea. Once an oral device is working properly, snoring should be improved. A home sleep test may be recommended at that time if to determine whether the sleep apnea is adequately treated.       Some things to remember:  -Oral devices are often, but not always, covered by your medical insurance. Be sure to check with your insurance provider.   -If you are referred for oral therapy, you will be given a list of specialized dentists to consider or you may choose to visit the Web site of the American Academy of Dental Sleep Medicine  -Oral devices are less likely to work  if you have severe sleep apnea or are extremely overweight.     More detailed information  An oral appliance is a small acrylic device that fits over the upper and lower teeth  (similar to a retainer or a mouth guard). This device slightly moves jaw forward, which moves the base of the tongue forward, opens the airway, improves breathing for effective treat snoring and obstructive sleep apnea in perhaps 7 out of 10 people .  The best working devices are custom-made by a dental device  after a mold is made of the teeth 1, 2, 3.  When is an oral appliance indicated?  Oral appliance therapy is recommended as a first-line treatment for patients with primary snoring, mild sleep apnea, and for patients with moderate sleep apnea who prefer appliance therapy to use of CPAP4, 5. Severity of sleep apnea is determined by sleep testing and is based on the number of respiratory events per hour of sleep.   How successful is oral appliance therapy?  The success rate of oral appliance therapy in patients with mild sleep apnea is 75-80% while in patients with moderate sleep apnea it is 50-70%. The chance of success in patients with severe sleep apnea is 40-50%. The research also shows that oral appliances have a beneficial effect on the cardiovascular health of NICKY patients at the same magnitude as CPAP therapy7.  Oral appliances should be a second-line treatment in cases of severe sleep apnea, but if not completely successful then a combination therapy utilizing CPAP plus oral appliance therapy may be effective. Oral appliances tend to be effective in a broad range of patients although studies show that the patients who have the highest success are females, younger patients, those with milder disease, and less severe obesity. 3, 6.   Finding a dentist that practices dental sleep medicine  Specific training is available through the American Academy of Dental Sleep Medicine for dentists interested in working in the field  of sleep. To find a dentist who is educated in the field of sleep and the use of oral appliances, near you, visit the Web site of the American Academy of Dental Sleep Medicine.    References  1. Amparo et al. Objectively measured vs self-reported compliance during oral appliance therapy for sleep-disordered breathing. Chest 2013; 144(5): 1615-0210.  2. Neda, et al. Objective measurement of compliance during oral appliance therapy for sleep-disordered breathing. Thorax 2013; 68(1): 91-96.  3. Chip et al. Mandibular advancement devices in 620 men and women with NICKY and snoring: tolerability and predictors of treatment success. Chest 2004; 125: 3207-6467.  4. Belinda et al. Oral appliances for snoring and NICKY: a review. Sleep 2006; 29: 244-262.  5. Nathan et al. Oral appliance treatment for NICKY: an update. J Clin Sleep Med 2014; 10(2): 215-227.  6. Stephy et al. Predictors of OSAH treatment outcome. J Dent Res 2007; 86: 9449-8972.      Weight Loss:    Weight loss is a long-term strategy that may improve sleep apnea in some patients.    Weight management is a personal decision and the decision should be based on your interest and the potential benefits.  If you are interested in exploring weight loss strategies, the following discussion covers the impact on weight loss on sleep apnea and the approaches that may be successful.    Being overweight does not necessarily mean you will have health consequences.  Those who have BMI over 35 or over 27 with existing medical conditions carries greater risk.   Weight loss decreases severity of sleep apnea in most people with obesity. For those with mild obesity who have developed snoring with weight gain, even 15-30 pound weight loss can improve and occasionally eliminate sleep apnea.  Structured and life-long dietary and health habits are necessary to lose weight and keep healthier weight levels.     Though there may be significant health benefits from  weight loss, long-term weight loss is very difficult to achieve- studies show success with dietary management in less than 10% of people. In addition, substantial weight loss may require years of dietary control and may be difficult if patients have severe obesity. In these cases, surgical management may be considered.  Finally, older individuals who have tolerated obesity without health complications may be less likely to benefit from weight loss strategies.      [unfilled]    Surgery:    Surgery for obstructive sleep apnea is considered generally only when other therapies fail to work. Surgery may be discussed with you if you are having a difficult time tolerating CPAP and or when there is an abnormal structure that requires surgical correction.  Nose and throat surgeries often enlarge the airway to prevent collapse.  Most of these surgeries create pain for 1-2 weeks and up to half of the most common surgeries are not effective throughout life.  You should carefully discuss the benefits and drawbacks to surgery with your sleep provider and surgeon to determine if it is the best solution for you.   More information  Surgery for NICKY is directed at areas that are responsible for narrowing or complete obstruction of the airway during sleep.  There are a wide range of procedures available to enlarge and/or stabilize the airway to prevent blockage of breathing in the three major areas where it can occur: the palate, tongue, and nasal regions.  Successful surgical treatment depends on the accurate identification of the factors responsible for obstructive sleep apnea in each person.  A personalized approach is required because there is no single treatment that works well for everyone.  Because of anatomic variation, consultation with an examination by a sleep surgeon is a critical first step in determining what surgical options are best for each patient.  In some cases, examination during sedation may be recommended in  order to guide the selection of procedures.  Patients will be counseled about risks and benefits as well as the typical recovery course after surgery. Surgery is typically not a cure for a person s NICKY.  However, surgery will often significantly improve one s NICKY severity (termed  success rate ).  Even in the absence of a cure, surgery will decrease the cardiovascular risk associated with OSA7; improve overall quality of life8 (sleepiness, functionality, sleep quality, etc).      Palate Procedures:  Patients with NICKY often have narrowing of their airway in the region of their tonsils and uvula.  The goals of palate procedures are to widen the airway in this region as well as to help the tissues resist collapse.  Modern palate procedure techniques focus on tissue conservation and soft tissue rearrangement, rather than tissue removal.  Often the uvula is preserved in this procedure. Residual sleep apnea is common in patient after pharyngoplasty with an average reduction in sleep apnea events of 33%2.      Tongue Procedures:  ExamWhile patients are awake, the muscles that surround the throat are active and keep this region open for breathing. These muscles relax during sleep, allowing the tongue and other structures to collapse and block breathing.  There are several different tongue procedures available.  Selection of a tongue base procedure depends on characteristics seen on physical exam.  Generally, procedures are aimed at removing bulky tissues in this area or preventing the back of the tongue from falling back during sleep.  Success rates for tongue surgery range from 50-62%3.    Hypoglossal Nerve Stimulation:  Hypoglossal nerve stimulation has recently received approval from the [place] Food and Drug Administration for the treatment of obstructive sleep apnea.  This is based on research showing that the system was safe and effective in treating sleep apnea6.  Results showed that the median AHI score  decreased 68%, from 29.3 to 9.0. This therapy uses an implant system that senses breathing patterns and delivers mild stimulation to airway muscles, which keeps the airway open during sleep.  The system consists of three fully implanted components: a small generator (similar in size to a pacemaker), a breathing sensor, and a stimulation lead.  Using a small handheld remote, a patient turns the therapy on before bed and off upon awakening.    Candidates for this device must be greater than 18 years of age, have moderate to severe NICKY (AHI between 15-65), BMI less than 35, have tried CPAP/oral appliance for at least 8 weeks without success, and have appropriate upper airway anatomy (determined by a sleep endoscopy performed by Dr. Suhas Dale).    Hypoglossal Nerve Stimulation Pathway:    The sleep surgeon s office will work with the patient through the insurance prior-authorization process (including communications and appeals).    Nasal Procedures:  Nasal obstruction can interfere with nasal breathing during the day and night.  Studies have shown that relief of nasal obstruction can improve the ability of some patients to tolerate positive airway pressure therapy for obstructive sleep apnea1.  Treatment options include medications such as nasal saline, topical corticosteroid and antihistamine sprays, and oral medications such as antihistamines or decongestants. Non-surgical treatments can include external nasal dilators for selected patients. If these are not successful by themselves, surgery can improve the nasal airway either alone or in combination with these other options.      Combination Procedures:  Combination of surgical procedures and other treatments may be recommended, particularly if patients have more than one area of narrowing or persistent positional disease.  The success rate of combination surgery ranges from 66-80%2,3.    References  Esthela GARCIA. The Role of the Nose in Snoring and Obstructive  Sleep Apnoea: An Update.  Eur Arch Otorhinolaryngol. 2011; 268: 1365-73.   Franco SM; Kayleen JA; Daphney JR; Pallanch JF; Kellie MB; Max SG; Greg CASTANEDA. Surgical modifications of the upper airway for obstructive sleep apnea in adults: a systematic review and meta-analysis. SLEEP 2010;33(10):5884-5749. Bridget PUENTE. Hypopharyngeal surgery in obstructive sleep apnea: an evidence-based medicine review.  Arch Otolaryngol Head Neck Surg. 2006 Feb;132(2):206-13.  Mir YH1, [place] Y, Homer MEGGAN. The efficacy of anatomically based multilevel surgery for obstructive sleep apnea. Otolaryngol Head Neck Surg. 2003 Oct;129(4):327-35.  Kezirian E, Goldberg A. Hypopharyngeal Surgery in Obstructive Sleep Apnea: An Evidence-Based Medicine Review. Arch Otolaryngol Head Neck Surg. 2006 Feb;132(2):206-13.  Martina OSBORNE et al. Upper-Airway Stimulation for Obstructive Sleep Apnea.  N Engl J Med. 2014 Jan 9;370(2):139-49.  Peker Y et al. Increased Incidence of Cardiovascular Disease in Middle-aged Men with Obstructive Sleep Apnea. Am J Respir Crit Care Med; 2002 166: 159-165  Goff EM et al. Studying Life Effects and Effectiveness of Palatopharyngoplasty (SLEEP) study: Subjective Outcomes of Isolated Uvulopalatopharyngoplasty. Otolaryngol Head Neck Surg. 2011; 144: 623-631.        WHAT IF I ONLY HAVE SNORING?    Mandibular advancement devices, lateral sleep positioning, long-term weight loss and treatment of nasal allergies have been shown to improve snoring.  Exercising tongue muscles with a game (https://apps.Urbantech.ThirdSpaceLearning/us/shawn/soundly-reduce-snoring/og4118629986) or stimulating the tongue during the day with a device (https://doi.org/10.3390/pck49377185) have improved snoring in some individuals.    Remember to Drive Safe... Drive Alive     Sleep health profoundly affects your health, mood, and your safety.  Thirty three percent of the population (one in three of us) is not getting enough sleep and many have a sleep disorder. Not getting  enough sleep or having an untreated / undertreated sleep condition may make us sleepy without even knowing it. In fact, our driving could be dramatically impaired due to our sleep health. As your provider, here are some things I would like you to know about driving:     Here are some warning signs for impairment and dangerous drowsy driving:              -Having been awake more than 16 hours               -Looking tired               -Eyelid drooping              -Head nodding (it could be too late at this point)              -Driving for more than 30 minutes     Some things you could do to make the driving safer if you are experiencing some drowsiness:              -Stop driving and rest              -Call for transportation              -Make sure your sleep disorder is adequately treated     Some things that have been shown NOT to work when experiencing drowsiness while driving:              -Turning on the radio              -Opening windows              -Eating any  distracting  /  entertaining  foods (e.g., sunflower seeds, candy, or any other)              -Talking on the phone      Your decision may not only impact your life, but also the life of others. Please, remember to drive safe for yourself and all of us.

## 2023-09-05 NOTE — PROGRESS NOTES
Virtual Visit Details    Type of service:  Video Visit     Originating Location (pt. Location): Home    Distant Location (provider location):  On-site  Platform used for Video Visit: [facility]        Outpatient Sleep Medicine Consultation:      Name: Margarito Sofia MRN# [id number]   Age: 37 year old YOB: 1985     Date of Consultation: September 5, 2023  Consultation is requested by: Carina Gallagher MD  [address] [id number] Carina TAMAYO*  Primary care provider: Carina Ordonez       Reason for Sleep Consult:     Margarito Sofia is sent by Carina Gallagher for a sleep consultation regarding sleep apnea.    Patient s Reason for visit  Margarito Sofia main reason for visit: I might have sleep apnea  Patient states problem(s) started: A while over a year  Margarito SHELBY Blancowang's goals for this visit: to discuss this problem and how to stay asleep fully           Assessment and Plan:     Summary Sleep Diagnoses:   Patient has features and risk factors for possible obstructive sleep apnea including: BMI of 43, loud snoring, witnessed apnea, non-refreshing sleep, excessive daytime sleepiness (ESS 11), difficulty maintaining sleep, crowded oropharynx and co-morbid DM type 2.The STOP-BANG score is 6/8.  Plan:    1. Schedule a Home Sleep Apnea Testing to evaluate for obstructive sleep apnea.    2. Recommend weight management which the patient is already working on.     Summary Recommendations:  Orders Placed This Encounter   Procedures    HST-Home Sleep Apnea Test - Noxturnal Returnable     Summary Counseling:    Sleep Testing Reviewed  Obstructive Sleep Apnea Reviewed  Complications of Untreated Sleep Apnea Reviewed      Medical Decision-making:   Educational materials provided in instructions    Total time spent reviewing medical records, history and physical examination, review of previous testing and interpretation as well as documentation on  this date:50 minutes    CC: Carina TAMAYO         History of Present Illness:     Past Sleep Evaluations:NA    SLEEP-WAKE SCHEDULE:     Work/School Days: Patient goes to school/work: yes   Usually gets into bed at 11pm  Takes patient about 30 to 40 minitues to fall asleep  Has trouble falling asleep 4 nights per week  Wakes up in the middle of the night 2-3 times times.  Wakes up due to Snorting self awake;Use the bathroom;Anxiety;Nightmares  He has trouble falling back asleep no times a week.   It usually takes 5 to 15 minitues to get back to sleep  Patient is usually up at 7 am  Uses alarm: Yes    Weekends/Non-work Days/All Other Days:  Usually gets into bed at 12am - 1am   Takes patient about 5 minitues to fall asleep  Patient is usually up at 9am  Uses alarm: No    Sleep Need  Patient gets  6 hours sleep on average. He does not feel refreshed.    Patient thinks he needs about 6 to 7 hours if i could stay asleep all night most likely I need 8 to be completely focused sleep    Margarito Sofia prefers to sleep in this position(s): Side   Patient states they do the following activities in bed: Watch TV    Naps  Patient takes a purposeful nap 0 times a week and naps are usually If i do nap about an hour in duration  He feels better after a nap: Yes  He dozes off unintentionally 3-4 times a week days per week  Patient has had a driving accident or near-miss due to sleepiness/drowsiness: No      SLEEP DISRUPTIONS:    Breathing/Snoring  Patient snores:Yes  Other people complain about his snoring: Yes  Patient has been told he stops breathing in his sleep:Yes  He has issues with the following: Morning headaches;Stuffy nose when you wake up;Heartburn or reflux at night;Getting up to urinate more than once    Movement:  Patient gets pain, discomfort, with an urge to move:  Yes  It happens when he is resting:  Yes  It happens more at night:  No  Patient has been told he kicks his legs at night:  Yes     Behaviors  in Sleep:  Margarito Sofia has experienced the following behaviors while sleeping: Recurring Nightmares;Kicking or punching  He has experienced sudden muscle weakness during the day: Yes. Not related to emotion      Is there anything else you would like your sleep provider to know: more than once a week I struggle to stay awake at work      CAFFEINE AND OTHER SUBSTANCES:    Patient consumes caffeinated beverages per day:  3  Last caffeine use is usually: 6pm  List of any prescribed or over the counter stimulants that patient takes:    List of any prescribed or over the counter sleep medication patient takes:    List of previous sleep medications that patient has tried:    Patient drinks alcohol to help them sleep: No  Patient drinks alcohol near bedtime: No    Family History:  Patient has a family member been diagnosed with a sleep disorder: No            SCALES:    EPWORTH SLEEPINESS SCALE         9/5/2023     7:43 AM    Bath Sleepiness Scale ( LENA Blood  [id number]<br>ESS - [place]/English - Final version - 21 Nov 07 - [facility] [place].)   Sitting and reading Slight chance of dozing   Watching TV High chance of dozing   Sitting, inactive in a public place (e.g. a theatre or a meeting) Slight chance of dozing   As a passenger in a car for an hour without a break High chance of dozing   Lying down to rest in the afternoon when circumstances permit High chance of dozing   Sitting and talking to someone Would never doze   Sitting quietly after a lunch without alcohol Would never doze   In a car, while stopped for a few minutes in traffic Would never doze   Bath Score (MC) 11   Bath Score (Sleep) 11         INSOMNIA SEVERITY INDEX (TED)          9/5/2023     7:30 AM   Insomnia Severity Index (TED)   Difficulty falling asleep 1   Difficulty staying asleep 3   Problems waking up too early 2   How SATISFIED/DISSATISFIED are you with your CURRENT sleep pattern? 3   How NOTICEABLE to others do you think your  sleep problem is in terms of impairing the quality of your life? 2   How WORRIED/DISTRESSED are you about your current sleep problem? 3   To what extent do you consider your sleep problem to INTERFERE with your daily functioning (e.g. daytime fatigue, mood, ability to function at work/daily chores, concentration, memory, mood, etc.) CURRENTLY? 3   TED Total Score 17       Guidelines for Scoring/Interpretation:  Total score categories:  0-7 = No clinically significant insomnia   8-14 = Subthreshold insomnia   15-21 = Clinical insomnia (moderate severity)  22-28 = Clinical insomnia (severe)  Used via courtesy of www.Dualogth.va.gov with permission from Don Humphrey PhD., [facility]      STOP BANG         9/5/2023     9:02 AM   STOP BANG Questionnaire (  2008, the American Society of Anesthesiologists, Inc. [employer]   BMI Clinic: 43.4         GAD7        11/23/2020     1:39 PM   GILLIAN-7    1. Feeling nervous, anxious, or on edge 3   2. Not being able to stop or control worrying 3   3. Worrying too much about different things 3   4. Trouble relaxing 3   5. Being so restless that it is hard to sit still 0   6. Becoming easily annoyed or irritable 3   7. Feeling afraid, as if something awful might happen 3   GILLIAN-7 Total Score 18   If you checked any problems, how difficult have they made it for you to do your work, take care of things at home, or get along with other people? Not difficult at all         CAGE-AID        11/23/2020     1:39 PM   CAGE-AID Flowsheet   Have you ever felt you should Cut down on your drinking or drug use? 0   Have people Annoyed you by criticizing your drinking or drug use? 0   Have you ever felt bad or Guilty about your drinking or drug use? 0   Have you ever had a drink or used drugs first thing in the morning to steady your nerves or to get rid of a hangover? (Eye opener) 0   CAGE-AID SCORE 0       CAGE-AID reprinted with permission from the [place]  "Medical Journal, BRISEYDA Baer. and MARYLU Simms, \"Conjoint screening questionnaires for alcohol and drug abuse\" [place] [employer] Journal 94: 135-140, 1995.      PATIENT HEALTH QUESTIONNAIRE-9 (PHQ - 9)        11/23/2020     1:39 PM   PHQ-9 ([employer])   1.  Little interest or pleasure in doing things 1   2.  Feeling down, depressed, or hopeless 1   3.  Trouble falling or staying asleep, or sleeping too much 2   4.  Feeling tired or having little energy 2   5.  Poor appetite or overeating 3   6.  Feeling bad about yourself - or that you are a failure or have let yourself or your family down 1   7.  Trouble concentrating on things, such as reading the newspaper or watching television 3   8.  Moving or speaking so slowly that other people could have noticed. Or the opposite - being so fidgety or restless that you have been moving around a lot more than usual 0   9.  Thoughts that you would be better off dead, or of hurting yourself in some way 0   PHQ-9 Total Score 13   If you checked off any problems, how difficult have these problems made it for you to do your work, take care of things at home, or get along with other people? Not difficult at all   6.  Feeling bad about yourself 1   7.  Trouble concentrating 3   8.  Moving slowly or restless 0   9.  Suicidal or self-harm thoughts 0   Difficulty at work, home, or with people Not difficult at all       Developed by Candice Bar, Isabela Mcneill, Regis Epstein and colleagues, with an educational michell from [employer] No permission required to reproduce, translate, display or distribute.        Allergies:    Allergies   Allergen Reactions    Bees     Poison Lin Extract        Medications:    Current Outpatient Medications   Medication Sig Dispense Refill    acetaminophen (TYLENOL) 325 MG tablet Take 2 tablets (650 mg) by mouth every 4 hours as needed for other (For optimal non-opioid multimodal pain management to improve pain control.) 90 tablet 0    benzoyl " peroxide (PANOXYL) 10 % external liquid Use daily as directed 148 mL 11    desonide (DESOWEN) 0.05 % external cream Apply to rash in groin twice a day as needed for rash 60 g 1    EPINEPHrine (ANY BX GENERIC EQUIV) 0.3 MG/0.3ML injection 2-pack Inject 0.3 mLs (0.3 mg) into the muscle as needed for anaphylaxis May repeat one time in 5-15 minutes if response to initial dose is inadequate. 2 each 3    FLUoxetine (PROZAC) 20 MG capsule Take 1 capsule (20 mg) by mouth daily 90 capsule 3    hydrOXYzine (ATARAX) 25 MG tablet Take 1 tablet (25 mg) by mouth 3 times daily as needed for anxiety 30 tablet 3    inFLIXimab (REMICADE) 100 MG injection 700 mg every 28 days       semaglutide (OZEMPIC, 0.25 OR 0.5 MG/DOSE,) 2 MG/3ML pen Inject 0.25 mg Subcutaneous once a week For 4 weeks, Then increase to 0.5 mg once weekly thereafter. 3 mL 3    sulfamethoxazole-trimethoprim (BACTRIM DS) 800-160 MG tablet Take 1 tablet by mouth 2 times daily 180 tablet 1       Problem List:  Patient Active Problem List    Diagnosis Date Noted    Diabetes mellitus, type 2 (H) 05/03/2023     Priority: Medium    Morbid obesity (H) 10/07/2021     Priority: Medium    Hidradenitis suppurativa 08/29/2019     Priority: Medium    Myopia 05/07/2018     Priority: Medium        Past Medical/Surgical History:  Past Medical History:   Diagnosis Date    Hidradenitis suppurativa     Marginal corneal ulcer of left eye 03/21/2018    Morbid obesity (H)     Swelling of multiple joints 05/01/2019     Past Surgical History:   Procedure Laterality Date    GRAFT FLAP PEDICLE CHEST WALL Left 12/27/2021    Procedure: Left axillary and wound excision with latissimus flap and split thickness skin graft, spy, neck wound excision and closure;  Surgeon: SHELBY Ivy MD;  Location:  OR       Social History:  Social History     Socioeconomic History    Marital status: Single     Spouse name: Not on file    Number of children: Not on file    Years of education: Not on  file    Highest education level: Not on file   Occupational History    Occupation:    Tobacco Use    Smoking status: Never    Smokeless tobacco: Never   Substance and Sexual Activity    Alcohol use: Yes     Comment: socially    Drug use: Not on file    Sexual activity: Not on file   Other Topics Concern    Parent/sibling w/ CABG, MI or angioplasty before 65F 55M? Not Asked   Social History Narrative    Not on file     Social Determinants of Health     Financial Resource Strain: Not on file   Food Insecurity: Not on file   Transportation Needs: Not on file   Physical Activity: Not on file   Stress: Not on file   Social Connections: Not on file   Intimate Partner Violence: Not on file   Housing Stability: Not on file       Family History:  Family History   Problem Relation Age of Onset    Inflammatory Bowel Disease Mother     Skin Cancer No family hx of     Melanoma No family hx of        Review of Systems:  A complete review of systems reviewed by me is negative with the exeption of what has been mentioned in the history of present illness.  In the last TWO WEEKS have you experienced any of the following symptoms?  Fevers: No  Night Sweats: No  Weight Gain: No  Pain at Night: Yes  Double Vision: Yes  Changes in Vision: Yes  Difficulty Breathing through Nose: No  Sore Throat in Morning: Yes  Dry Mouth in the Morning: Yes  Shortness of Breath Lying Flat: No  Shortness of Breath With Activity: Yes  Awakening with Shortness of Breath: Yes  Increased Cough: No  Heart Racing at Night: No  Swelling in Feet or Legs: No  Diarrhea at Night: No  Heartburn at Night: Yes  Urinating More than Once at Night: Yes  Losing Control of Urine at Night: No  Joint Pains at Night: Yes  Headaches in Morning: Yes  Weakness in Arms or Legs: No  Depressed Mood: Yes  Anxiety: Yes     Physical Examination:  Vitals: Ht 1.829 m (6')   Wt 145.2 kg (320 lb)   BMI 43.40 kg/m    BMI= Body mass index is 43.4 kg/m .           GENERAL  APPEARANCE: alert and no distress  EYES: Eyes grossly normal to inspection  HENT: oropharynx crowded  NECK: no asymmetry, masses, or scars  RESP: breathing is non-labored  NEURO: mentation intact and speech normal  PSYCH: affect normal/bright  Mallampati Class: III.  Tonsillar Stage:          Data: All pertinent previous laboratory data reviewed     Recent Labs   Lab Test 05/01/23  1550 12/28/21  0731    136   POTASSIUM 4.3 4.1   CHLORIDE 98 104   CO2 28 24   ANIONGAP 11 8   * 148*   BUN 10.1 14   CR 1.02 0.82   KOURTNEY 9.5 8.7       Recent Labs   Lab Test 05/01/23  1550   WBC 13.3*   RBC 5.39   HGB 15.6   HCT 47.0   MCV 87   MCH 28.9   MCHC 33.2   RDW 13.2          Recent Labs   Lab Test 05/01/23  1550   PROTTOTAL 9.0*   ALBUMIN 4.3   BILITOTAL 0.4   ALKPHOS 104   AST 24   ALT 32       TSH   Date Value   05/01/2023 1.93 uIU/mL   07/09/2021 1.31 mU/L       Sally Choi PA-C 9/5/2023

## 2023-09-19 ENCOUNTER — MYC MEDICAL ADVICE (OUTPATIENT)
Dept: INTERNAL MEDICINE | Facility: CLINIC | Age: 38
End: 2023-09-19
Payer: COMMERCIAL

## 2023-09-19 DIAGNOSIS — F43.22 ADJUSTMENT DISORDER WITH ANXIOUS MOOD: ICD-10-CM

## 2023-09-19 DIAGNOSIS — E11.9 DIABETES MELLITUS, TYPE 2 (H): Primary | Chronic | ICD-10-CM

## 2023-09-29 ENCOUNTER — DOCUMENTATION ONLY (OUTPATIENT)
Dept: PHARMACY | Facility: CLINIC | Age: 38
End: 2023-09-29
Payer: COMMERCIAL

## 2023-09-29 NOTE — PROGRESS NOTES
Skilled Nurse visit in the [facility] to administer Remicade.  No recent elevated temperature, fever, chills, productive cough, coughing for 3 weeks or longer or hemoptysis, abnormal vital signs, night sweats, chest pain. No  decrease in your appetite, unexplained weight loss or fatigue.  No other new onset medical symptoms.  Current weight 333.4lbs.  Peripheral IV, right Lower Forearm, 1   attempt. Pre medicated with tylenol and benadryl. Infusion completed without complication or reaction. Pt reports therapy is effective in managing symptoms related to therapy.

## 2023-10-07 ENCOUNTER — HEALTH MAINTENANCE LETTER (OUTPATIENT)
Age: 38
End: 2023-10-07

## 2023-10-18 DIAGNOSIS — L30.8 PSORIASIFORM ERUPTION: ICD-10-CM

## 2023-10-20 NOTE — TELEPHONE ENCOUNTER
DESONIDE 0.05% CREAM   Last Written Prescription Date:   1/20/2023  Last Fill Quantity: 60,   # refills: 1  Last Office Visit :  7/13/2023  Future Office visit:  10/26/2023    Routing refill request to provider for review/approval because:  Refer to Provider for review and refills per Provider orders.     Josiane Funk RN  Central Triage Red Flags/Med Refills

## 2023-10-22 ASSESSMENT — SLEEP AND FATIGUE QUESTIONNAIRES
HOW LIKELY ARE YOU TO NOD OFF OR FALL ASLEEP WHILE SITTING INACTIVE IN A PUBLIC PLACE: SLIGHT CHANCE OF DOZING
HOW LIKELY ARE YOU TO NOD OFF OR FALL ASLEEP WHILE LYING DOWN TO REST IN THE AFTERNOON WHEN CIRCUMSTANCES PERMIT: HIGH CHANCE OF DOZING
HOW LIKELY ARE YOU TO NOD OFF OR FALL ASLEEP WHEN YOU ARE A PASSENGER IN A CAR FOR AN HOUR WITHOUT A BREAK: HIGH CHANCE OF DOZING
HOW LIKELY ARE YOU TO NOD OFF OR FALL ASLEEP IN A CAR, WHILE STOPPED FOR A FEW MINUTES IN TRAFFIC: SLIGHT CHANCE OF DOZING
HOW LIKELY ARE YOU TO NOD OFF OR FALL ASLEEP WHILE WATCHING TV: HIGH CHANCE OF DOZING
HOW LIKELY ARE YOU TO NOD OFF OR FALL ASLEEP WHILE SITTING AND TALKING TO SOMEONE: WOULD NEVER DOZE
HOW LIKELY ARE YOU TO NOD OFF OR FALL ASLEEP WHILE SITTING QUIETLY AFTER LUNCH WITHOUT ALCOHOL: WOULD NEVER DOZE
HOW LIKELY ARE YOU TO NOD OFF OR FALL ASLEEP WHILE SITTING AND READING: SLIGHT CHANCE OF DOZING

## 2023-10-23 RX ORDER — DESONIDE 0.5 MG/G
CREAM TOPICAL
Qty: 60 G | Refills: 2 | Status: SHIPPED | OUTPATIENT
Start: 2023-10-23

## 2023-10-24 ENCOUNTER — OFFICE VISIT (OUTPATIENT)
Dept: SLEEP MEDICINE | Facility: CLINIC | Age: 38
End: 2023-10-24
Attending: PHYSICIAN ASSISTANT
Payer: COMMERCIAL

## 2023-10-24 DIAGNOSIS — R06.00 DYSPNEA AND RESPIRATORY ABNORMALITY: ICD-10-CM

## 2023-10-24 DIAGNOSIS — R06.89 DYSPNEA AND RESPIRATORY ABNORMALITY: ICD-10-CM

## 2023-10-24 DIAGNOSIS — G47.30 SLEEP APNEA, UNSPECIFIED TYPE: ICD-10-CM

## 2023-10-24 DIAGNOSIS — Z72.820 LACK OF ADEQUATE SLEEP: ICD-10-CM

## 2023-10-24 DIAGNOSIS — R53.81 MALAISE AND FATIGUE: ICD-10-CM

## 2023-10-24 DIAGNOSIS — R53.83 MALAISE AND FATIGUE: ICD-10-CM

## 2023-10-24 PROCEDURE — G0399 HOME SLEEP TEST/TYPE 3 PORTA: HCPCS | Performed by: INTERNAL MEDICINE

## 2023-10-24 NOTE — PROGRESS NOTES
Pt is completing a home sleep test. Pt was instructed on how to put on the Noxturnal T3 device and associated equipment before going to bed and given the opportunity to practice putting it on before leaving the sleep center. Pt was reminded to bring the home sleep test kit back to the center tomorrow, at agreed upon time for download and reporting.   Neck circumference: 57.79 CM / 22.75 inches.

## 2023-10-25 ENCOUNTER — DOCUMENTATION ONLY (OUTPATIENT)
Dept: SLEEP MEDICINE | Facility: CLINIC | Age: 38
End: 2023-10-25
Payer: COMMERCIAL

## 2023-10-25 NOTE — PROGRESS NOTES
This HSAT was performed using a Noxturnal T3 device which recorded snore, sound, movement activity, body position, nasal pressure, oronasal thermal airflow, pulse, oximetry and both chest and abdominal respiratory effort. HSAT data was restricted to the time patient states they were in bed.     HSAT was scored using 1B 4% hypopnea rule.     HST AHI (Non-PAT): 33.1  Snoring was reported as loud.  Time with SpO2 below 89% was 27.5 minutes.   Overall signal quality was good     Pt will follow up with sleep provider to determine appropriate therapy.

## 2023-10-25 NOTE — PROCEDURES
HOME SLEEP STUDY INTERPRETATION        Patient: Margarito Sofia  MRN: [id number]  YOB: 1985  Study Date: 10/24/2023  PCP/Referring Provider: Carina Ordonez;  Ordering Provider:   Sally Choi PA-C         Indications for Home Study: Margarito Sofia is a 37 year old male with symptoms suggestive of obstructive sleep apnea.    Estimated body mass index is 43.4 kg/m  as calculated from the following:    Height as of 9/5/23: 1.829 m (6').    Weight as of 9/5/23: 145.2 kg (320 lb).  Total score - [place]: 12 (10/22/2023  4:37 PM)  StopBang Total Score: 6 (9/5/2023 10:00 AM)Total Score: 6 (10/22/2023  4:41 PM)        Data: A full night home sleep study was performed recording the standard physiologic parameters including body position, movement, sound, nasal pressure, thermal oral airflow, chest and abdominal movements with respiratory inductance plethysmography, and oxygen saturation by pulse oximetry. Pulse rate was estimated by oximetry recording. This study was considered adequate based on > 4 hours of quality oximetry and respiratory recording. As specified by the AASM Manual for the Scoring of Sleep and Associated events, version 2.3, Rule VIII.D 1B, 4% oxygen desaturation scoring for hypopneas is used as a standard of care on all home sleep apnea testing.        Analysis Time:  404 minutes        Respiration:   Sleep Associated Hypoxemia: sustained hypoxemia was not present. Baseline oxygen saturation was 97%.  Time with saturation less than or equal to 88% was 6 minutes. The lowest oxygen saturation was 84%.   Snoring: Snoring was present.  Respiratory events: The home study revealed a presence of 82 obstructive apneas and 1 mixed and central apneas. There were 140 hypopneas resulting in a combined apnea/hypopnea index [AHI] of 33 events per hour.  AHI was 37 per hour supine, 26 per hour prone, 36 per hour on left side, and 29 per hour on right side.   Pattern: Excluding events noted  above, respiratory rate and pattern was Normal.      Position: Percent of time spent: supine - 41%, prone - 22%, on left - 17%, on right - 20%.      Heart Rate: By pulse oximetry normal rate was noted.       Assessment:   Severe obstructive sleep apnea.  Sleep associated hypoxemia was present.    Recommendations:  Consider auto-CPAP at 7-18 cmH2O, oral appliance therapy, or polysomnography with full night PAP titration.  Suggest optimizing sleep hygiene and avoiding sleep deprivation.  Weight management.        Diagnosis Code(s): Obstructive Sleep Apnea G47.33, Hypoxemia G47.36    Electronically signed by: Cosme Bellamy MD, October 25, 2023   Diplomate, American Board of Internal Medicine, Sleep Medicine

## 2023-10-25 NOTE — PROGRESS NOTES
Pt returned HST device. It was downloaded and forwarded data to the clinical specialist for scoring.    Yakelin Shin CMA, HST Specialist  [place] [facility]

## 2023-10-25 NOTE — PROGRESS NOTES
HST POST-STUDY QUESTIONNAIRE    What time did you go to bed?  23:30  How long do you think it took to fall asleep?  30 min  What time did you wake up to start the day?  06:00  Did you get up during the night at all?  yes  If you woke up, do you remember approximately what time(s)? 03:20  Did you have any difficulty with the equipment?  No  Did you us any type of treatment with this study?  None  Was the head of the bed elevated? No  Did you sleep in a recliner?  No  Did you stop using CPAP at least 3 days before this test?  Yes  Any other information you'd like us to know?

## 2023-10-25 NOTE — PROGRESS NOTES
SpO2 Readings from Last 4 Encounters:   05/03/23 99%   03/30/22 94%   02/16/22 95%   01/19/22 96%

## 2023-10-27 ENCOUNTER — DOCUMENTATION ONLY (OUTPATIENT)
Dept: PHARMACY | Facility: CLINIC | Age: 38
End: 2023-10-27
Payer: COMMERCIAL

## 2023-10-27 NOTE — PROGRESS NOTES
Skilled Nurse visit in the \[facility]\"" Ambulatory Infusion Site to administer Inflectra 700mg.  No recent elevated temperature, fever, chills, productive cough, coughing for 3 weeks or longer or hemoptysis, abnormal vital signs, night sweats, chest pain. No  decrease in your appetite, unexplained weight loss or fatigue.  No other new onset medical symptoms.  Current weight 334lb.  Peripheral IVright AC, 2attempts Pre medicated with Acetaminophen and Diphenhydramine. Labs drawn none. Infusion completed without complication or reaction. Pt reports therapy is effective in managing symptoms related to therapy.   Sherrie Coleman RN  [place] home infusion  [email or web address]  [phone]

## 2023-11-08 ASSESSMENT — SLEEP AND FATIGUE QUESTIONNAIRES
HOW LIKELY ARE YOU TO NOD OFF OR FALL ASLEEP WHILE WATCHING TV: HIGH CHANCE OF DOZING
HOW LIKELY ARE YOU TO NOD OFF OR FALL ASLEEP WHILE SITTING AND TALKING TO SOMEONE: WOULD NEVER DOZE
HOW LIKELY ARE YOU TO NOD OFF OR FALL ASLEEP WHILE LYING DOWN TO REST IN THE AFTERNOON WHEN CIRCUMSTANCES PERMIT: HIGH CHANCE OF DOZING
HOW LIKELY ARE YOU TO NOD OFF OR FALL ASLEEP WHEN YOU ARE A PASSENGER IN A CAR FOR AN HOUR WITHOUT A BREAK: MODERATE CHANCE OF DOZING
HOW LIKELY ARE YOU TO NOD OFF OR FALL ASLEEP WHILE SITTING INACTIVE IN A PUBLIC PLACE: SLIGHT CHANCE OF DOZING
HOW LIKELY ARE YOU TO NOD OFF OR FALL ASLEEP WHILE SITTING QUIETLY AFTER LUNCH WITHOUT ALCOHOL: SLIGHT CHANCE OF DOZING
HOW LIKELY ARE YOU TO NOD OFF OR FALL ASLEEP IN A CAR, WHILE STOPPED FOR A FEW MINUTES IN TRAFFIC: WOULD NEVER DOZE
HOW LIKELY ARE YOU TO NOD OFF OR FALL ASLEEP WHILE SITTING AND READING: SLIGHT CHANCE OF DOZING

## 2023-11-09 ENCOUNTER — VIRTUAL VISIT (OUTPATIENT)
Dept: SLEEP MEDICINE | Facility: CLINIC | Age: 38
End: 2023-11-09
Payer: COMMERCIAL

## 2023-11-09 VITALS — WEIGHT: 315 LBS | HEIGHT: 72 IN | BODY MASS INDEX: 42.66 KG/M2

## 2023-11-09 DIAGNOSIS — G47.33 OSA (OBSTRUCTIVE SLEEP APNEA): Primary | ICD-10-CM

## 2023-11-09 PROCEDURE — 99212 OFFICE O/P EST SF 10 MIN: CPT | Mod: VID

## 2023-11-09 ASSESSMENT — PAIN SCALES - GENERAL: PAINLEVEL: MILD PAIN (2)

## 2023-11-09 NOTE — PROGRESS NOTES
Virtual Visit Details    Type of service: Video Visit   Start time: 8:32 AM  End time: 8:44 AM  Originating Location (pt. Location): Home    Distant Location (provider location): Off-site  Platform used for Video Visit: [facility]    Sleep Study Follow-Up Visit:    Date on this visit: 11/9/2023    Margarito Sofia comes in today for follow-up of his home sleep study done on 10/24/2023 for loud snoring, witnessed apneic spells, non-refreshing sleep, and excessive daytime sleepiness. He felt he had a normal night of sleep.          These findings were reviewed with patient.     Past medical/surgical history, family history, social history, medications and allergies were reviewed.      Problem List:  Patient Active Problem List    Diagnosis Date Noted    Diabetes mellitus, type 2 (H) 05/03/2023     Priority: Medium    Morbid obesity (H) 10/07/2021     Priority: Medium    Hidradenitis suppurativa 08/29/2019     Priority: Medium    Myopia 05/07/2018     Priority: Medium        Impression/Plan:  (G47.33) NICKY (obstructive sleep apnea) (primary encounter diagnosis)  Comment: Margarito presents to the sleep clinic to review the results of his home sleep study done on 10/24/2023. He has a copy of his results in hand. His results were reviewed in depth. Informed Margarito he has severe obstructive sleep apnea with sleep associated hypoxemia. We reviewed treatment options for severe NICKY including Auto-PAP, oral appliance therapy, and weight management. Informed Margarito CPAP is the most effective treatment for severe apnea. Margarito elected to try the oral appliance. He is also working on weight loss and is already down 60 lbs.   Plan: Sleep Dental Referral  A sleep dental referral was placed. Congratulated Margarito on his weight loss, and we reviewed the relationship between weight and apnea. Discussed returning to the sleep clinic once his oral appliance is made to repeat a home sleep study to assess efficacy.    He will follow up with me in  about 3 month(s) once his oral appliance is made.     Total time spent reviewing medical records, history and physical examination, review of previous testing and interpretation as well as documentation on this date: 17 minutes     TATY Quick CNP 11/9/2023    CC: Carina Ordonez

## 2023-11-09 NOTE — NURSING NOTE
Has patient had flu shot for current/most recent flu season? If so, when? No    Is the patient currently in the state of [place]? YES    Visit mode:VIDEO    If the visit is dropped, the patient can be reconnected by: VIDEO VISIT: Text to cell phone:   Telephone Information:   Mobile [phone]       Will anyone else be joining the visit? NO  (If patient encounters technical issues they should call [phone]    How would you like to obtain your AVS? MyChart    Are changes needed to the allergy or medication list? No    Reason for visit: RECHECK    Lacy ALBA

## 2023-11-09 NOTE — PATIENT INSTRUCTIONS
You can also use the search engine listed below to find a sleep medicine dentist close to your location.    [facility]  Search engine: [email or web address].aad.org/members/directory/search_bootstrap.php?org_id=[email or web address]   Certified in Dental Sleep Medicine    Cameron Looney  Degree: DDS  [address]  [address]  [place] [address]  Professional Phone: [phone]  Website: [email or web address]    Blake Ge  Degree: DDS  [facility]  [address]  [address]  [place] [address]  Professional Phone: [phone]: [phone]    Loly Ward  [facility]  [address]  [place] [address]  Professional Phone: [phone]  Website: [email or web address]      Guanakito Huang  Degree: DDS  [address]  [address]  [place] [address]  Professional Phone: [phone]  Fax: [phone]    Marshal Mayorga  Degree: DDS  [facility]  [address]  [address]  [place] [address]  Professional Phone: [phone]  Website: [email or web address]      [facility]  [facility]  [address]  [address]  [place] [address]  Professional Phone: [phone]  Website: [email or web address]      Mima Li  Degree: DDS  [facility]  [address]  [address]  [place] [address]  Professional Phone: [phone]     Aimee Atkins  Degree: TOBY, PhD  [facility]  [address] [place] [address] [address] [place] [address],   [address]   [place] [address]   Appointments: [phone]   Fax: [phone]   [facility] [address]   [place] [address]   Appointments: [phone]   Fax: [phone]                Desean  Rodríguez  Degree: TOBY  [address] [place] [address]  Professional Phone: [phone]  Fax: [phone]  Website: [email or web address]      Dandy Quiñones  Degree: TOBY  [facility]  [address] [place] [address]    Kristenona Mulet Pradera  Degree: MS TOBY  [facility]  [address] [place] [address]  Professional Phone: [phone]      Lorena Cat  Degree: MS TOBY  [facility]  [address]  [address]  [place] [address]  Professional Phone: [phone]    Ester Harrington  Degree: TOBY  [facility]  [address]  [address]  [place] [address]  [place] [facility]     Beni Fields  Degree: TOBY  [facility]  [address] [address]  Professional Phone: [phone]  Website: [email or web address]      Artie Pettit  Degree: TOBY  [facility]  [address] [place] [address]  Professional Phone: [phone]  Fax: [phone]      Barrera Amaya  Degree: TOBY [facility]  [address]  [address]  [place] [address]    ACCEPT MEDICARE    Markie Avalos DDS  [facility], [address], [place] [address]  [phone]; [phone] (fax)  [employer]    Andrés White DDS, MS   [facility]   [address]   [address]   [place] [address]   Appointments: [phone]   Fax: [phone]       ADDITIONAL PROVIDERS    Wing Lr DDS   [facility]   [address]   [address]   [place] [address]   Appointments: [phone]   Fax: [phone]       Aquilino Cruz DDS, MS   [facility]  [address] [place] [address]   Appointments: [phone] Ext: [phone]  Fax: [phone]   [email or web address]

## 2023-11-24 ENCOUNTER — DOCUMENTATION ONLY (OUTPATIENT)
Dept: PHARMACY | Facility: CLINIC | Age: 38
End: 2023-11-24
Payer: COMMERCIAL

## 2023-11-24 NOTE — PROGRESS NOTES
Skilled Nurse visit in the [facility] to administer Inflectra 700mg.  No recent elevated temperature, fever, chills, productive cough, coughing for 3 weeks or longer or hemoptysis, abnormal vital signs, night sweats, chest pain. No  decrease in your appetite, unexplained weight loss or fatigue.  No other new onset medical symptoms.  Current weight 331lb.  Peripheral IVright AC, 1attempt Pre medicated with Acetaminophen and Diphenhydramine. Labs drawn none. Infusion completed without complication or reaction. Pt reports therapy is effective in managing symptoms related to therapy.   Sherrie Coleman RN  [facility]  [email or web address]  [phone]

## 2023-12-20 ENCOUNTER — DOCUMENTATION ONLY (OUTPATIENT)
Dept: PHARMACY | Facility: CLINIC | Age: 38
End: 2023-12-20
Payer: COMMERCIAL

## 2023-12-20 NOTE — PROGRESS NOTES
Skilled Nurse visit in the [facility] to administer Inflectra 700mg.  No recent elevated temperature, fever, chills, productive cough, coughing for 3 weeks or longer or hemoptysis, abnormal vital signs, night sweats, chest pain. No  decrease in your appetite, unexplained weight loss or fatigue.  No other new onset medical symptoms.  Current weight 320lb.  Peripheral IVright wrist, 1attempt Pre medicated with Acetaminophen and Diphenhydramine. Labs drawn none. Infusion completed without complication or reaction. Pt reports therapy is effective in managing symptoms related to therapy.   Sherrie Coleman RN  [facility]  [email or web address]  [phone]

## 2024-01-03 ENCOUNTER — VIRTUAL VISIT (OUTPATIENT)
Dept: PSYCHOLOGY | Facility: CLINIC | Age: 39
End: 2024-01-03
Payer: COMMERCIAL

## 2024-01-03 DIAGNOSIS — F41.1 GENERALIZED ANXIETY DISORDER: Primary | ICD-10-CM

## 2024-01-03 DIAGNOSIS — F88 DEVELOPMENTAL MENTAL DISORDER: ICD-10-CM

## 2024-01-03 PROCEDURE — 90832 PSYTX W PT 30 MINUTES: CPT | Mod: 95 | Performed by: PSYCHOLOGIST

## 2024-01-03 ASSESSMENT — ANXIETY QUESTIONNAIRES
4. TROUBLE RELAXING: MORE THAN HALF THE DAYS
2. NOT BEING ABLE TO STOP OR CONTROL WORRYING: NEARLY EVERY DAY
1. FEELING NERVOUS, ANXIOUS, OR ON EDGE: MORE THAN HALF THE DAYS
6. BECOMING EASILY ANNOYED OR IRRITABLE: NEARLY EVERY DAY
GAD7 TOTAL SCORE: 16
2. NOT BEING ABLE TO STOP OR CONTROL WORRYING: NEARLY EVERY DAY
GAD7 TOTAL SCORE: 16
GAD7 TOTAL SCORE: 16
3. WORRYING TOO MUCH ABOUT DIFFERENT THINGS: MORE THAN HALF THE DAYS
8. IF YOU CHECKED OFF ANY PROBLEMS, HOW DIFFICULT HAVE THESE MADE IT FOR YOU TO DO YOUR WORK, TAKE CARE OF THINGS AT HOME, OR GET ALONG WITH OTHER PEOPLE?: VERY DIFFICULT
7. FEELING AFRAID AS IF SOMETHING AWFUL MIGHT HAPPEN: NEARLY EVERY DAY
GAD7 TOTAL SCORE: 16
IF YOU CHECKED OFF ANY PROBLEMS ON THIS QUESTIONNAIRE, HOW DIFFICULT HAVE THESE PROBLEMS MADE IT FOR YOU TO DO YOUR WORK, TAKE CARE OF THINGS AT HOME, OR GET ALONG WITH OTHER PEOPLE: VERY DIFFICULT
7. FEELING AFRAID AS IF SOMETHING AWFUL MIGHT HAPPEN: NEARLY EVERY DAY
5. BEING SO RESTLESS THAT IT IS HARD TO SIT STILL: SEVERAL DAYS
4. TROUBLE RELAXING: MORE THAN HALF THE DAYS
3. WORRYING TOO MUCH ABOUT DIFFERENT THINGS: MORE THAN HALF THE DAYS
5. BEING SO RESTLESS THAT IT IS HARD TO SIT STILL: SEVERAL DAYS
GAD7 TOTAL SCORE: 16
7. FEELING AFRAID AS IF SOMETHING AWFUL MIGHT HAPPEN: NEARLY EVERY DAY
1. FEELING NERVOUS, ANXIOUS, OR ON EDGE: MORE THAN HALF THE DAYS
6. BECOMING EASILY ANNOYED OR IRRITABLE: NEARLY EVERY DAY
GAD7 TOTAL SCORE: 16
8. IF YOU CHECKED OFF ANY PROBLEMS, HOW DIFFICULT HAVE THESE MADE IT FOR YOU TO DO YOUR WORK, TAKE CARE OF THINGS AT HOME, OR GET ALONG WITH OTHER PEOPLE?: VERY DIFFICULT
IF YOU CHECKED OFF ANY PROBLEMS ON THIS QUESTIONNAIRE, HOW DIFFICULT HAVE THESE PROBLEMS MADE IT FOR YOU TO DO YOUR WORK, TAKE CARE OF THINGS AT HOME, OR GET ALONG WITH OTHER PEOPLE: VERY DIFFICULT
7. FEELING AFRAID AS IF SOMETHING AWFUL MIGHT HAPPEN: NEARLY EVERY DAY

## 2024-01-03 ASSESSMENT — PATIENT HEALTH QUESTIONNAIRE - PHQ9
SUM OF ALL RESPONSES TO PHQ QUESTIONS 1-9: 12
SUM OF ALL RESPONSES TO PHQ QUESTIONS 1-9: 12
10. IF YOU CHECKED OFF ANY PROBLEMS, HOW DIFFICULT HAVE THESE PROBLEMS MADE IT FOR YOU TO DO YOUR WORK, TAKE CARE OF THINGS AT HOME, OR GET ALONG WITH OTHER PEOPLE: VERY DIFFICULT

## 2024-01-03 ASSESSMENT — COLUMBIA-SUICIDE SEVERITY RATING SCALE - C-SSRS
1. HAVE YOU WISHED YOU WERE DEAD OR WISHED YOU COULD GO TO SLEEP AND NOT WAKE UP?: NO
2. HAVE YOU ACTUALLY HAD ANY THOUGHTS OF KILLING YOURSELF?: NO
TOTAL  NUMBER OF INTERRUPTED ATTEMPTS LIFETIME: NO
6. HAVE YOU EVER DONE ANYTHING, STARTED TO DO ANYTHING, OR PREPARED TO DO ANYTHING TO END YOUR LIFE?: NO
ATTEMPT LIFETIME: NO
TOTAL  NUMBER OF ABORTED OR SELF INTERRUPTED ATTEMPTS LIFETIME: NO
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
## 2024-01-09 ASSESSMENT — PATIENT HEALTH QUESTIONNAIRE - PHQ9
SUM OF ALL RESPONSES TO PHQ QUESTIONS 1-9: 12
SUM OF ALL RESPONSES TO PHQ QUESTIONS 1-9: 12
10. IF YOU CHECKED OFF ANY PROBLEMS, HOW DIFFICULT HAVE THESE PROBLEMS MADE IT FOR YOU TO DO YOUR WORK, TAKE CARE OF THINGS AT HOME, OR GET ALONG WITH OTHER PEOPLE: SOMEWHAT DIFFICULT

## 2024-01-10 ENCOUNTER — VIRTUAL VISIT (OUTPATIENT)
Dept: PSYCHOLOGY | Facility: CLINIC | Age: 39
End: 2024-01-10
Payer: COMMERCIAL

## 2024-01-10 DIAGNOSIS — F41.1 GENERALIZED ANXIETY DISORDER: Primary | ICD-10-CM

## 2024-01-10 DIAGNOSIS — F88 DEVELOPMENTAL MENTAL DISORDER: ICD-10-CM

## 2024-01-10 PROCEDURE — 90791 PSYCH DIAGNOSTIC EVALUATION: CPT | Mod: 95 | Performed by: PSYCHOLOGIST
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
## 2024-01-16 DIAGNOSIS — E11.9 DIABETES MELLITUS, TYPE 2 (H): Chronic | ICD-10-CM

## 2024-01-20 NOTE — TELEPHONE ENCOUNTER
OZEMPIC 4 MG/3 ML (1 MG/DOSE)       Last Written Prescription Date:  9-20-23  Last Fill Quantity: 3 ml,   # refills: 3  Last Office Visit : 6-26-23  Future Office visit:  none    Last clinic note:LINUS/MELI Pimentel was seen today for recheck.  Doing well, we discussed uptitrating semaglutide to help facilitate weight loss as no side effects are noted.  Will repeat A1c in two months and refer to mental health for ADHD assessment.         Routing refill request to provider for review/approval because:  Last rx: dosage change, ? Continue  Past due for A1c and appt

## 2024-01-22 DIAGNOSIS — E11.9 DIABETES MELLITUS, TYPE 2 (H): Chronic | ICD-10-CM

## 2024-01-22 RX ORDER — SEMAGLUTIDE 1.34 MG/ML
1.7 INJECTION, SOLUTION SUBCUTANEOUS
Qty: 9 ML | Refills: 1 | Status: SHIPPED | OUTPATIENT
Start: 2024-01-22 | End: 2024-08-07

## 2024-01-25 ENCOUNTER — TELEPHONE (OUTPATIENT)
Dept: INTERNAL MEDICINE | Facility: CLINIC | Age: 39
End: 2024-01-25
Payer: COMMERCIAL

## 2024-01-25 RX ORDER — SEMAGLUTIDE 1.34 MG/ML
1.7 INJECTION, SOLUTION SUBCUTANEOUS
OUTPATIENT
Start: 2024-01-25

## 2024-01-25 NOTE — TELEPHONE ENCOUNTER
Prior Authorization Retail Medication Request    Medication/Dose: Semaglutide, 1 MG/DOSE, (OZEMPIC, 1 MG/DOSE,) 4 MG/3ML pen      Insurance       As of Date    01/25/2024     Member Information    Name: JANEL LOPEZ [[id number]]     Number: [id number] Relationship to subscriber: Self   Effective date: 1/1/2021 Termination date: --     Coverage Information    Subscriber name: JANEL LOPEZ     Subscriber number: [id number] Group number: [id number]   Payer: MEDICA [13] Benefit plan: MEDICA CHOICE [[id number]]               Plan Details     Component Group Net Aut Ten Ref Clsfr Level MOOP/Ded Patient Portion Limit Left Benefit Bucket Admission Group   Physician Office Visits    All Procedures --   Copay Table of Coverage: MEDICA-MEDICA CHOICE [[id number]] No copay/coinsurance table information available -- Copay Table of Plan: MEDICA CHOICE [[id number]] No copay/coinsurance table information available -- --

## 2024-01-30 ENCOUNTER — VIRTUAL VISIT (OUTPATIENT)
Dept: PSYCHOLOGY | Facility: CLINIC | Age: 39
End: 2024-01-30
Payer: COMMERCIAL

## 2024-01-30 DIAGNOSIS — F90.2 ATTENTION DEFICIT HYPERACTIVITY DISORDER (ADHD), COMBINED TYPE: Primary | ICD-10-CM

## 2024-01-30 DIAGNOSIS — F41.1 GENERALIZED ANXIETY DISORDER: ICD-10-CM

## 2024-01-30 DIAGNOSIS — F33.1 MAJOR DEPRESSIVE DISORDER, RECURRENT EPISODE, MODERATE (H): ICD-10-CM

## 2024-01-30 PROCEDURE — 96130 PSYCL TST EVAL PHYS/QHP 1ST: CPT | Mod: 95 | Performed by: PSYCHOLOGIST

## 2024-01-30 PROCEDURE — 96131 PSYCL TST EVAL PHYS/QHP EA: CPT | Mod: 95 | Performed by: PSYCHOLOGIST

## 2024-01-30 NOTE — PROGRESS NOTES
[facility]   Mental Health & Addiction Services     Progress Note - ADHD Feedback Session     Patient Name: Margarito Sofia  Date: 2024       Service Type:  Individual       Session Start Time:  3:00pm  Session End Time:  3:30pm     Session Length: 30 minutes    Session #: 3    Attendees: Patient attended alone    Service Modality: Video Visit:      Provider verified identity through the following two step process.  Patient provided:  Patient  and Patient address    Telemedicine Visit: The patient's condition can be safely assessed and treated via synchronous audio and visual telemedicine encounter.      Reason for Telemedicine Visit: Services only offered telehealth    Originating Site (Patient Location): Patient's home    Distant Site (Provider Location): Provider Remote Setting- Home Office    Consent:  The patient/guardian has verbally consented to: the potential risks and benefits of telemedicine (video visit) versus in person care; bill my insurance or make self-payment for services provided; and responsibility for payment of non-covered services.     Patient would like the video invitation sent by:  Send to e-mail at: [email or web address]    Mode of Communication:  Video Conference via [employer]    Distant Location (Provider):  Off-site    As the provider I attest to compliance with applicable laws and regulations related to telemedicine.          2020     1:39 PM 1/3/2024     7:58 AM 2024     6:20 PM   PHQ   PHQ-9 Total Score 13 12 12   Q9: Thoughts of better off dead/self-harm past 2 weeks Not at all Not at all Not at all           2020     1:39 PM 1/3/2024     8:16 AM   GILLIAN-7 SCORE   Total Score  16 (severe anxiety)   Total Score 18 16    16         DATA      Progress Since Last Session (Related to Symptoms / Goals / Homework):   Symptoms: Stable.    Homework: Completed.      Treatment Objective(s) Addressed in  This Session:   Provided feedback on ADHD evaluation. Reviewed test results in depth. Plan of care and recommendations were discussed based on testing data. See full report attached on secondary note in this encounter.     Intervention:   Provided feedback to patient regarding testing results, diagnoses, and treatment recommendations. Test results are consistent with an ADHD diagnosis. Symptoms are not better explained by depression and anxiety disorders. Personalized suggestions regarding symptoms were offered. Patient had the opportunity to ask questions; he expressed understanding.        ASSESSMENT: Current Emotional / Mental Status (status of significant symptoms):   Risk status (Self / Other harm or suicidal ideation)   Patient denies current fears or concerns for personal safety.   Patient denies current or recent suicidal ideation or behaviors.   Patient denies current or recent homicidal ideation or behaviors.   Patient denies current or recent self injurious behavior or ideation.   Patient denies other safety concerns.   Patient reports there has been no change in risk factors since their last session.     Patient reports there has been no change in protective factors since their last session.     Recommended that patient call 911 or go to the local ED should there be a change in any of these risk factors.     Appearance:   Appropriate    Eye Contact:   Good    Psychomotor Behavior: Normal    Attitude:   Cooperative    Orientation:   All   Speech    Rate / Production: Normal     Volume:  Normal    Mood:    Normal   Affect:    Appropriate    Thought Content:  Clear    Thought Form:  Coherent  Logical    Insight:    Good      Medication Review:   No changes to current psychiatric medication(s)     Medication Compliance:   Yes     Changes in Health Issues:   None reported     Chemical Use Review:   Substance Use: See report.      Nicotine Use: No current tobacco use.      Diagnosis:  1. Attention deficit  hyperactivity disorder (ADHD), combined type    2. Generalized anxiety disorder    3. Major depressive disorder, recurrent episode, moderate (H)        PLAN:   Recommendations are outlined in full evaluation report (attached to this encounter).   Patient indicated understanding and will contact the clinic if there are further questions.    Report routed to referring provider.    Parts of this documentation may have been completed using dictation software. Potential errors may result and are unintentional.       Nahomi Martinez PsyD, LP  Clinical Psychologist         Psychological Testing Services Summary       Testing Evaluation Services Base: 33080  (first 60 mins) Add-on: 67005  (each addtl 60 mins)   Record Review and Clarify Referral Question   850am-9:00am on 1/3/2024 10 minutes   Clinical Decision Making/Battery Modification   7:45am-8:00am on 1/10/2024 15 minutes   Integration/Report Generation   12:00pm-1:00pm on 1/29/2024 ([facility])  10:15am-10:45am on 1/29/2024 ([facility])  1:00pm-1:45pm on 1/29/2024 (MMPI-3)  8:30pm-9:45pm on 1/29/2024 (Final Report)   60 minutes  30 minutes  45 minutes  75 minutes   Interactive Feedback Session   3:00pm-3:30pm on 1/30/2024 30 minutes   Post-Service Work   3:30pm-3:45pm on 1/30/2024 15 minutes   Total Time: 280 minutes   Total Units: 1 4       Diagnoses:   F90.2 Attention-Deficit/Hyperactivity Disorder, combined presentation, moderate  F41.1 Generalized Anxiety Disorder  F33.1 Major Depressive Disorder, recurrent episode, moderate

## 2024-01-30 NOTE — PROGRESS NOTES
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 range.  Reasoning score to be in the average range, as the patient was able to solve nonverbal puzzle matrices comparable to peers.  Processing speed score to be in the above average range, as the patient was able to scan and respond to visual stimuli more effectively than peers.  On the Stroop test, the patient was able to inhibit the salient, but incorrect, response in the average range.  On a test of shifting attention, the patient was able to adjust his responses according to changing rules sets in the average range.  As such, complex attention, cognitive flexibility, and executive functioning were all scored to be in the average range.  On a test of continuous performance, the patient was able to debra his attention in the very low range and resist making impulsive mistakes in the average range; simple attention scored to be in the average range.  On a more complex continuous performance test that included one-back and two-back components, the patient was able to sustain his attention across time and hold information in mind for short-term manipulation in the above average range.  On CNS Vital Signs, ADHD is associated with significant deficits in attention, processing speed, and executive functioning capabilities.  However, deficits are difficult to detect when  other aspects of cognitive functioning (i.e. intelligence) are in the above average range.  It is possible that this is occurring in the current case, as the patient was often ahead in school.  Deficits are also difficult to detect when primary issues are regarding hyperactivity.  Given longstanding problems and lack of other possible explanation for symptoms, current difficulties can be conceptualized as having a developmental basis.  See recommendations below.    Referral Question Response: DSM-5 criteria for ADHD:   A. Symptom Count - Are there sufficient symptoms for the diagnosis? Yes, patient did endorse sufficient inattention and hyperactivity symptoms.   B. Onset - Were several symptoms present before 10 years of age? Yes, a significant number of symptoms reportedly began in elementary school.   C. Pervasiveness - Are several symptoms present in at least two settings? Yes, patient reported that symptoms are problematic at home and work.   D. Impairment - Do symptoms interfere with or reduce the quality of functioning? Yes, patient is unable to complete daily tasks effectively.   E. Exclusions - Are symptoms better explained by another disorder or factor?  No, symptoms are not better explained by anxiety and depression symptoms. Difficulties are explained by an organic basis of inattention.     The patient meets the following DSM-5 criteria for generalized anxiety disorder:  A. Excessive anxiety and worry, occurring more days than not for at least 6 months about a number of events or activities.   B. The individual finds it difficult to control the worry.  C. The anxiety and worry are associated with 3 or more of 6 symptoms.  D. The anxiety, worry, or physical symptoms cause clinically significant distress or impairment in social, occupational, or other important areas of functioning.  E. The disturbance is not attributable to the physiological effects of a substance (e.g., a drug of abuse, a medication) or  another medical condition (e.g., hyperthyroidism).  F. The disturbance is not better explained by another mental disorder.    The patient also meets the following DSM-5 criteria for major depressive disorder:  A. Five (or more) symptoms have been present during the same 2-week period and represent a change from previous functioning; at least one of the symptoms is either (1) depressed mood or (2) loss of interest or pleasure.   Depressed mood.   Diminished interest or pleasure in all, or almost all, activities.   Significant appetite change.  Significant sleep change.   Fatigue or loss of energy.   Feelings of worthlessness or inappropriate guilt.   Diminished ability to think or concentrate, or indecisiveness.   Psychomotor agitation or lethargy.  B. The symptoms cause clinically significant distress or impairment in social, occupational, or other important areas of functioning.  C. The episode is not attributable to the physiological effects of a substance or to another medical condition.  D. The occurrence of major depressive episode is not better explained by other thought / psychotic disorders.  E. There has never been a manic episode or hypomanic episode.     DIAGNOSES:  F90.2 Attention-Deficit/Hyperactivity Disorder, combined presentation, moderate  F41.1 Generalized Anxiety Disorder  F33.1 Major Depressive Disorder, recurrent episode, moderate    PLAN OF CARE:  Discuss the following with your primary care provider:  Consider a trial of a stimulant medication. This may help alleviate some of the patient's attentional symptoms.     Consider initiating individual psychotherapy to help alleviate mood symptoms. Research indicates that outcomes are best with both medication and therapy. You can call the [facility] line at [phone]. MURPHY Emmanuel is recommended specifically because she works with individuals with ADHD.    RECOMMENDATIONS:  Due to the patient's reported  attention, concentration, and mood difficulties, the following health/lifestyle changes when combined, can significantly improve symptoms:   Avoid simple carbohydrates at breakfast. Aim for only complex carbohydrates and lean protein for your morning meal.   Engage in aerobic exercise 3 times per week for 30 minutes, ensuring that your heart rate stays within your training zone. Further, reading the book,  Spark,  by Anthony Walsh M.D can help the patient understand the benefits of exercise on the brain.   Research suggest that taking a high-quality multi-vitamin and antioxidant (1/2 cup of blueberries) daily in conjunction with balanced nutrition can be helpful.  Aim for the high end of daily water intake: around 72 ounces per day.  Ensure regular meals and snacks to maintain optimal attention.    The following may be beneficial in managing some of the patient's attention and concentration difficulties:  Due to the patient's difficulties with attention and concentration, consider working in a completely distraction-free area while completing tasks. Workspaces should be completely clear except for the materials needed for the current task. Both visual and auditory distractions should be decreased as much as possible.  Considering decreased ability to focus and maintain attention, it is recommended that the patient take frequent breaks while completing tasks. This will help to maintain attention and effort. The patient may benefit from the use of a StudyEgg Timer. The timer works by using built-in break times. After working on a task consistently for 25 minutes, the timer reminds the user to take a five-minute break before continuing, etc. A StudyEgg timer can be downloaded as a free shawn to a phone or tablet.  Due to the patient's attentional and concentration symptoms, it is recommended to increase organization with the use of lists and calendars. Significantly increasing structure to the day and adhering to a set  "schedule can increase your ability to complete responsibilities, track deadline, etc. Breaking these tasks down into their component parts and recording them in a calendar/planner will likely be beneficial. Patient would benefit from setting feasible timelines for completion of activities. By establishing clear priorities for completing tasks, you can more likely complete the most important tasks first. The patient may also choose to elect to a friend or family member to help hold them accountable.    Avoid multitasking. Attempting to work on multiple tasks and projects the same increases the likelihood that an error will occur. Focus on one task at a time.    The patient may benefit from engaging in mindfulness practices. This may include breathing techniques, apps that provide guided meditation, or more interactive activities such as coloring.    Develop a \"coping skills jar/box.\" This entails designating a certain container to hold slips of paper with distraction technique ideas written on each slip of paper. Distraction techniques may include listening to a certain type of music, playing on game on your phone, doing a breathing exercise, spending time with a pet, calling a certain individual, looking at a magazine, working on a puzzle, etc. When feeling distressed, choose a slip of paper from the container and engage in that activity rather than focusing on the problem.    See the attached tip sheet for more ideas as well as online and book resources.       Nahomi Martinez PsyD,   Clinical Psychologist   "

## 2024-01-30 NOTE — Clinical Note
Hello,  I wanted to let you know that I have completed the ADHD assessment with this patient.  As you will see the report, data do support an ADHD diagnosis.  I encouraged him to make an appointment with you soon to discuss medication options.  Please let me know if you have any questions or concerns!  Thanks!   Nahomi

## 2024-01-30 NOTE — PATIENT INSTRUCTIONS
Coping with Attention-Deficit/Hyperactivity Disorder (ADHD)    If you have ADHD, it can be hard to sit still, pay attention or control impulsive behavior. ADHD can cause problems at home, at school, at work and in social settings. But you can learn ways to control your symptoms. Below are some ideas for coping with the most common ADHD problems.     Memory, Focus, and Managing Time  Be mindful of time. Use a watch, phone, timer, alarm, PDA or computer--anything that keeps accurate time that you can see and hear at all times. Set more than one alarm to remind you how much time you have left for a task. Give yourself more time than you think you need. For important appointments or deadlines, set reminder alarms hours or days ahead of time.   Use a day planner. A day planner can help you manage time and remember responsibilities. Learning to use a planner is just like learning to use any tool--practice makes perfect.   Use lists. Lists and notes can help you keep track of regular tasks, projects, deadlines and appointments. If you decide to use a daily planner, keep all lists and notes inside of it. Use color codes for tasks so you know which ones are the most important.  Learn to say no and set boundaries. Do not take on too many projects or social engagements. Overbooking yourself can be overwhelming and can lead to missed commitments.  Repeat out loud the instructions you have been given. This will help you remember, as well as let others correct you if needed.    Organization  Create space. Ask yourself what you need on a daily basis. Then, find storage bins or closets for things you don t need every day. Have specific areas for things like keys, bills and other items that are easy to misplace. Throw away or donate things you don t need.   Deal with it now. You can avoid forgetfulness, clutter and procrastination by doing things right away, not some time in the future. This includes filing papers, cleaning up  messes, opening and responding to mail and returning phone calls.  Set up a filing system. Use dividers or separate file folders for different types of documents, such as medical records, receipts and pay stubs. Label and color-code your files so that you can quickly find what you need.   Break larger tasks into small, manageable pieces. Write down the steps needed to complete the task. Follow each step in order until the task is done. If needed, take small, timed breaks and return to finish the task.  Organize your work space. Use lists or planners to organize your day. Remove clutter from your desk. Label any storage bins. Reduce distraction as much as possible. Ideas include sitting facing the wall, closing your door or using a white noise machine.    Sitting Still  Move around as needed. Don t fight the urge to move around. If you need to fidget or stand up for a period of time to help maintain attention, then do so. But take care that you re not interrupting others. You may also find it helpful to squeeze a stress ball.  Be active in a useful way. Exercise can burn off extra energy, relieve stress, boost your mood and calm your mind. Meditation, yoga or tory chi can help you better control your attention and impulses.  Stay healthy. Get enough sleep. Avoid caffeine late in the day. Exercise. Create a relaxing bedtime routine. Stick to a schedule or daily routine. Eat small meals throughout the day. Avoid sugar, eat fewer carbohydrates and eat more protein.     Close Relationships  Talk to your loved ones about ADHD. There are many resources available that can help your loved one understand ADHD. See the Resources section below.  Divide daily tasks based on each person s strengths. For example, if you have trouble with organization, you may not want to do financial planning tasks.  If you have trouble with focus, develop a sign that others in your life can use as a gentle reminder to pay attention. The sign  should be small but meaningful to you and the other person.  Improve communication. Use a dry erase board in a common area to help the whole family stay in touch better. Keep regular to-do lists and compare scheduled activities every day. Writing notes to each other is also very helpful.  Be an active listener and try not to interrupt. If you find your mind wandering when others are talking, mentally repeat their words so you can follow the conversation. Ask questions and ask people to repeat what they said if needed. Pay close attention to body language.   Organize your thoughts. If you need to have a serious conversation, write a list of the points you would like to make or the important ideas you want to talk about. This can help you stay focused and remember what you need to say.  Think before speaking. It can be hard to control your impulses when you feel strongly about something. Before saying whatever pops into your head, stop to ask yourself  Will this be helpful?  or  Will this help me get what I want?   Take charge of your life. Work to accept that some things are harder for you. Make a plan to address these troubles and seek the support you need.    Online Resources  ADD Warehouse. http://www.Echometrix.com  ADHD and You.  Tips for Adults with ADHD.  http://www.adhdandyou.com/adhd-patient/adhd-tips-young-adult/  Attention Deficit Disorder Association. http://www.add.org  Attention Deficit Disorder Resources. http://www.addresources.org  Children and Adults with Attention-Deficit/Hyperactivity Disorder (DREA). http://www.drea.org/  Molly Whitley.  33 Ways to Get Organized with Adult ADHD.  http://www.additudemag.com/adhd/article/729.html  National Resource Center on ADHD. http://www.ntcj4ujdj.org/  Nirmala Johnson.  Daily Living Tips for Adult ADHD.  http://www.medicinenet.com/living_tips_adult_adhd_pictures_slideshow/article.htm  Valentino Aggarwal and Emily Petty.  Help for Adult ADD / ADHD.   http://www.helpguide.org/mental/adhd_add_adult_strategies.htm  You can also find many apps for your phone that can help you with common ADHD struggles    Book Resources  Ej Packer. ADHD in Adults. (2008).  Ej Packer. Taking Charge of Adult ADHD. (2010).  Arturo Walsh. Delivered from Distraction. (2006).  Arturo Walsh. Driven to Distraction. (2011).  Khalida Watts. ADD in the Workplace. (1997).  Khalida Watts. ADD-Friendly Ways to Organize Your Life. (2002).  Shayy Lindo. The ADHD Effect on Marriage: Understand and Rebuild Your Relationship in Six Steps. (2010).  Luba Mccallum and Betina Ku. You Mean I m Not Lazy, Stupid or Crazy? (2006).  Elier Husain. Understand Your Brain, Get More Done: The ADHD Executive Functions Workbook. (2012).    Support Groups  ADHD Adult Support Group  [address].  7:00 to 8:30 p.m., last Thursday of each month (except December).  Contact/RSVP: [email or web address]    ADHD Adult Support Group  [address].  Thursday 10:00 a.m. to 12:00 p.m. or 7:00 to 9:30 p.m.  Contact/RSVP: Christopher Aguilera. [phone] or [email or web address]     ADHD Adult Support Group for Spouses/Partners of adults with ADHD  [address].  Tuesday 12:00 to 2:00 p.m.   Contact/RSVP: Christopher Aguilera. [phone] or [email or web address]

## 2024-02-05 ENCOUNTER — TELEPHONE (OUTPATIENT)
Dept: INTERNAL MEDICINE | Facility: CLINIC | Age: 39
End: 2024-02-05

## 2024-02-05 ENCOUNTER — VIRTUAL VISIT (OUTPATIENT)
Dept: INTERNAL MEDICINE | Facility: CLINIC | Age: 39
End: 2024-02-05
Payer: COMMERCIAL

## 2024-02-05 DIAGNOSIS — F90.9 ATTENTION DEFICIT HYPERACTIVITY DISORDER (ADHD), UNSPECIFIED ADHD TYPE: Primary | ICD-10-CM

## 2024-02-05 DIAGNOSIS — E11.9 TYPE 2 DIABETES MELLITUS WITHOUT COMPLICATION, WITHOUT LONG-TERM CURRENT USE OF INSULIN (H): ICD-10-CM

## 2024-02-05 PROCEDURE — 99214 OFFICE O/P EST MOD 30 MIN: CPT | Mod: 95 | Performed by: INTERNAL MEDICINE

## 2024-02-05 RX ORDER — ATOMOXETINE 10 MG/1
10 CAPSULE ORAL DAILY
Qty: 90 CAPSULE | Refills: 3 | Status: SHIPPED | OUTPATIENT
Start: 2024-02-05

## 2024-02-05 NOTE — PROGRESS NOTES
Margarito is here to discuss medications including semaglutide and also, recent evaluation for ADHD.    He is up to 1.0 mg weekly on semaglutide.  He is not yet losing weight but hasn't gained either which is a positive outcome.  He is eating less fast food, stopped drinking energy drinks, instead consumes more water.  We will increase the semaglutide dose to 1.7.  Discussed diet and exercise along with this medication.  He would benefit from 30 minutes regular exercise daily (walking, biking, elliptical).     The neuropsychology evaluation was helpful in that it confirmed ADHD and did not show evidence of underlying depression or anxiety.  Will add strattera, this medication would be the safest option in terms of cardiovascular effects.  Discussed titrating up the dose to achieve benefit.  Will also continue fluoxetine for now, as SSRIs have been shown in some studies to reduce food cravings, and it may help overall in his weight loss efforts.     He continues on infliximab for hidradenitis suppurativa.  He also had surgery in the axilla with a skin graft in place, now needs another repeat surgery in that same area, Margarito discussed possibly having this done out of state (family lives in [place] closer for post op recovery phase).    His blood pressure readings have been favorable recently, he gets it checked when he reports for the infliximab infusion.     On video exam, he is alert, in NAD.  No cough or wheezing noted.  Visible skin is without rash or erythema.  Mood/affect seem upbeat.    A/P Margarito was seen today for recheck medication.    Diagnoses and all orders for this visit:    Attention deficit hyperactivity disorder (ADHD), unspecified ADHD type  -    Start atomoxetine (STRATTERA) 10 MG capsule; Take 1 capsule (10 mg) by mouth daily May increase to 20 mg daily for more benefit after 2-3 weeks    Type 2 diabetes mellitus without complication, without long-term current use of insulin (H)  -     Hemoglobin A1c;  Future  --Has Rx to increase semaglutide to 1.7 mg weekly; can increase to 2.4 after 3-4 weeks as tolerated      RTC six months for an in person visit    Carina Gallagher MD               Virtual Visit Details    Type of service:  Video Visit started 9:26 ended     Originating Location (pt. Location): Home    Distant Location (provider location):  On-site  Platform used for Video Visit: Erin

## 2024-02-05 NOTE — NURSING NOTE
Is the patient currently in the state of [place]? YES    Visit mode:VIDEO    If the visit is dropped, the patient can be reconnected by: VIDEO VISIT: Text to cell phone:   Telephone Information:   Mobile [phone]       Will anyone else be joining the visit? NO  (If patient encounters technical issues they should call [phone]    How would you like to obtain your AVS? MyChart    Are changes needed to the allergy or medication list? Pt stated no changes to allergies and Pt stated no med changes    Reason for visit: RECHECK and Recheck Medication    Bautista SOLORIOF

## 2024-02-05 NOTE — PATIENT INSTRUCTIONS
Thank you for visiting the Primary Care Center today at the [facility]! The following is some information about our clinic:     Primary Care Center Frequently-Asked Questions    (1) How do I schedule appointments at the [facility]?     Primary Care--to schedule or make changes to an existing appointment, please call our primary care line at [phone].    Labs--to schedule a lab appointment at the [facility] you can use [facility] or call [phone]. If you have a [facility] location that is closer to home, you can reach out to that location for scheduling options.     Imaging--if you need to schedule a CT, X-ray, MRI, ultrasound, or other imaging study you can call [phone] to schedule at the [facility] or any other [facility] imaging location.     Referrals--if a referral to another specialty was ordered you can expect a phone call from their scheduling team. If you have not heard from them in a week, please call us or send us a [facility] message to check the status or get a scheduling number. Please note that this only applies to internal [facility] referrals. If the referral is external you would need to contact their office for scheduling.     (2) I have a question about my visit, who do I contact?     You can call us at the primary care line at [phone] to ask questions about your visit. You can also send a secure message through [facility], which is reviewed by clinic staff. Please note that [facility] messages have a twenty-four to forty-eight business hour turnaround time and should not be used for urgent concerns.    (3) How will I get the results of my tests?    If you are signed up for [facility] all tests will be released to you within twenty-four hours of resulting. Please allow three to five days for your doctor to review your results and place a note interpreting the results. If you do not have [facility] you will receive your  results through mail seven to ten business days following the return of the tests. Please note that if there should be any urgent or concerning results that your doctor or their registered nurse will reach out to you the same day as the tests come back. If you have follow up questions about your results or would like to discuss the results in detail please schedule a follow up with your provider either in person or virtually.     (4) How do I get refills of my prescriptions?     You should always first contact your pharmacy for refills of your medications. If submitting a refill request on [facility], please be sure to submit the request only once--repeat requests can cause delays in refill. If you are requesting a NEW medication or a medication related to new symptoms you will need to schedule an appointment with a provider prior to approval. Please note: Routine medication refills have up to one to three business day turnaround whereas controlled substances refills have up to five to seven business day turnaround.    (5) I have new symptoms, what do I do?     If you are having an immediate medical emergency, you should dial 911 for assistance.   For anything urgent that needs to be seen within a few hours to one day you should visit a local urgent care for assistance.  For non-urgent symptoms that need to be seen within a few days to a week you can schedule with an available provider in primary care by going to [employer] or calling [phone].   If you are not sure how serious your symptoms are or you would like to receive medical advice you can always call [phone] to speak with a triage nurse.

## 2024-02-07 NOTE — TELEPHONE ENCOUNTER
Prior Authorization Not Needed    Medication: OZEMPIC (1 MG/DOSE) 4 MG/3ML SC SOPN  Insurance Company: [employer] - Phone [phone] Fax [phone]  Expected CoPay: $    Pharmacy Filling the Rx: [id number] - [facility], [place] - [address].  Pharmacy Notified: Yes    Per pharmacy, PA is not needed. Patient was able  on 1/26/24.

## 2024-02-08 ENCOUNTER — TELEPHONE (OUTPATIENT)
Dept: INTERNAL MEDICINE | Facility: CLINIC | Age: 39
End: 2024-02-08

## 2024-02-08 NOTE — TELEPHONE ENCOUNTER
----- Message from Pat Yip sent at 2/5/2024 10:34 AM CST -----  KAIN    PCP wants 6 mo follow up ( 8/5) in person

## 2024-02-16 ENCOUNTER — DOCUMENTATION ONLY (OUTPATIENT)
Dept: PHARMACY | Facility: CLINIC | Age: 39
End: 2024-02-16

## 2024-02-16 ENCOUNTER — LAB REQUISITION (OUTPATIENT)
Dept: LAB | Facility: CLINIC | Age: 39
End: 2024-02-16
Payer: COMMERCIAL

## 2024-02-16 DIAGNOSIS — L73.2 HIDRADENITIS SUPPURATIVA: ICD-10-CM

## 2024-02-16 LAB — HBA1C MFR BLD: 5.6 %

## 2024-02-16 PROCEDURE — 83036 HEMOGLOBIN GLYCOSYLATED A1C: CPT | Performed by: INTERNAL MEDICINE

## 2024-02-16 NOTE — PROGRESS NOTES
Skilled Nurse visit in the [facility] Infusion Suite to administer Inflectra 700 mg IV. No recent elevated temperature, fever, chills, productive cough, coughing for 3 weeks or longer or hemoptysis, abnormal vital signs, night sweats, chest pain. No decrease in appetite, unexplained weight loss or fatigue.  No other new onset medical symptoms.  Current weight 331 lbs.  PIV placed in left AC, 2 attempts.  Pre medicated with Tylenol 650 mg po and Benadryl 50 mg po. Labs drawn pre-infusion: Hgb A1c as ordered.  Infusion completed without complication or reaction. Pt reports therapy is effective in helping to manage symptoms related to therapy.     Vero Huber, CONCEPCIONN, RN  [phone]  [email or web address]

## 2024-02-19 ENCOUNTER — MYC MEDICAL ADVICE (OUTPATIENT)
Dept: INTERNAL MEDICINE | Facility: CLINIC | Age: 39
End: 2024-02-19
Payer: COMMERCIAL

## 2024-02-19 DIAGNOSIS — E11.9 TYPE 2 DIABETES MELLITUS WITHOUT COMPLICATION, WITHOUT LONG-TERM CURRENT USE OF INSULIN (H): Primary | ICD-10-CM

## 2024-03-15 ENCOUNTER — DOCUMENTATION ONLY (OUTPATIENT)
Dept: PHARMACY | Facility: CLINIC | Age: 39
End: 2024-03-15
Payer: COMMERCIAL

## 2024-03-15 NOTE — PROGRESS NOTES
Skilled Nurse visit in the [facility] Infusion Suite to administer Inflectra 700 mg IV. No recent elevated temperature, fever, chills, productive cough, coughing for 3 weeks or longer or hemoptysis, abnormal vital signs, night sweats, chest pain. No decrease in appetite, unexplained weight loss or fatigue.  No other new onset medical symptoms.  Current weight 320.9 lbs.  PIV placed in right mid medial FA, 1 attempt.  Pre medicated with Tylenol 650 mg po and Benadryl 50 mg po. Labs drawn pre-infusion: none ordered. Infusion completed without complication or reaction. Pt reports therapy is effective in helping to manage symptoms related to therapy.     Vero Huber, CONCEPCIONN, RN  [phone]  [email or web address]

## 2024-03-29 ENCOUNTER — MYC REFILL (OUTPATIENT)
Dept: INTERNAL MEDICINE | Facility: CLINIC | Age: 39
End: 2024-03-29
Payer: COMMERCIAL

## 2024-03-29 DIAGNOSIS — F90.9 ATTENTION DEFICIT HYPERACTIVITY DISORDER (ADHD), UNSPECIFIED ADHD TYPE: ICD-10-CM

## 2024-03-29 RX ORDER — ATOMOXETINE 10 MG/1
20 CAPSULE ORAL DAILY
Qty: 180 CAPSULE | OUTPATIENT
Start: 2024-03-29

## 2024-03-29 NOTE — TELEPHONE ENCOUNTER
atomoxetine (STRATTERA) 10 MG capsule   Patient comment: I went up to 20mg after 2 weeks and I am currently out of this prescription     Please write a new day for 2 Tabs daily.  Thank you     Josiane Funk RN  Central Triage Red Flags/Med Refills'

## 2024-04-27 DIAGNOSIS — L73.2 HIDRADENITIS SUPPURATIVA: ICD-10-CM

## 2024-05-06 NOTE — TELEPHONE ENCOUNTER
sulfamethoxazole-trimethoprim (BACTRIM DS) 800-160 MG tablet 180 tablet 1 7/13/2023     Last Office Visit: 7/13/23  Future Office visit:   7/11/24  Oral Acne/Rosacea Medications Protocol [id number]/27/2024 10:15 AM   Protocol Details Confirmation of diagnosis is required    Medication indicated for associated diagnosis     Routing refill request to provider for review/approval because:  Diagnosis does not match medication indication, cannot fill per protocol.     Radha Chávez RN  P Red Flag Triage/MRT

## 2024-05-09 DIAGNOSIS — L73.2 HIDRADENITIS SUPPURATIVA: ICD-10-CM

## 2024-05-09 RX ORDER — SULFAMETHOXAZOLE/TRIMETHOPRIM 800-160 MG
1 TABLET ORAL 2 TIMES DAILY
Qty: 180 TABLET | Refills: 3 | Status: SHIPPED | OUTPATIENT
Start: 2024-05-09

## 2024-05-09 RX ORDER — SULFAMETHOXAZOLE/TRIMETHOPRIM 800-160 MG
1 TABLET ORAL 2 TIMES DAILY
Qty: 180 TABLET | Refills: 1 | Status: SHIPPED | OUTPATIENT
Start: 2024-05-09 | End: 2024-05-09

## 2024-05-10 ENCOUNTER — DOCUMENTATION ONLY (OUTPATIENT)
Dept: PHARMACY | Facility: CLINIC | Age: 39
End: 2024-05-10
Payer: COMMERCIAL

## 2024-05-10 NOTE — PROGRESS NOTES
Skilled Nurse visit in the [facility] Infusion Site to administer Remicade 700mg.  No recent elevated temperature, fever, chills, productive cough, coughing for 3 weeks or longer or hemoptysis, abnormal vital signs, night sweats, chest pain. No  decrease in your appetite, unexplained weight loss or fatigue.  No other new onset medical symptoms.  Current weight 316.8lbs.  Peripheral IVright AC, 1 attempt Pre medicated with acetaminophen 650mg and diphendydramine 50mg PO. Infusion completed without complication or reaction. Pt reports therapy iseffective in managing symptoms related to therapy.     Jose De Jesus Noe, STEVIE, MSN  [facility] - RN Field Clinician  [email or web address]

## 2024-06-03 ENCOUNTER — TELEPHONE (OUTPATIENT)
Dept: INTERNAL MEDICINE | Facility: CLINIC | Age: 39
End: 2024-06-03
Payer: COMMERCIAL

## 2024-06-03 NOTE — TELEPHONE ENCOUNTER
Prior Authorization Approval    Medication: SEMAGLUTIDE (2 MG/DOSE) 8 MG/3ML SC SOPN  Authorization Effective Date: 4/28/2024  Authorization Expiration Date: 5/28/2025  Insurance Company: [employer] Non-Specialty PA's - Phone [phone] Fax [phone]    RECEIVED PHONE CALL FROM [employer] - [place] - PA APPROVED BUT NO ENCOUNTER IN [facility]. LETTER WILL BE SENT TO PATIENT AND PROVIDER.

## 2024-06-07 ENCOUNTER — DOCUMENTATION ONLY (OUTPATIENT)
Dept: PHARMACY | Facility: CLINIC | Age: 39
End: 2024-06-07
Payer: COMMERCIAL

## 2024-06-07 NOTE — PROGRESS NOTES
Skilled Nurse visit in the [facility]fusion Site to administer Inflectra 700mg.  No recent elevated temperature, fever, chills, productive cough, coughing for 3 weeks or longer or hemoptysis, abnormal vital signs, night sweats, chest pain. No  decrease in your appetite, unexplained weight loss or fatigue.  No other new onset medical symptoms.  Current weight 320lb.  Peripheral IVright Upper forearm, 1attempt Pre medicated with Acetaminophen, Diphenhydramine. Labs drawn none. Infusion completed without complication or reaction. Pt reports therapy is effective in managing symptoms related to therapy.   Sherrie Coleman RN  [place] [facility]  [email or web address]  [phone]

## 2024-07-04 DIAGNOSIS — L73.2 HIDRADENITIS SUPPURATIVA: Primary | ICD-10-CM

## 2024-07-05 ENCOUNTER — DOCUMENTATION ONLY (OUTPATIENT)
Dept: PHARMACY | Facility: CLINIC | Age: 39
End: 2024-07-05
Payer: COMMERCIAL

## 2024-07-05 NOTE — PROGRESS NOTES
Skilled Nurse visit in the [facility] Infusion Site to administer Inflectra 700mg.  No recent elevated temperature, fever, chills, productive cough, coughing for 3 weeks or longer or hemoptysis, abnormal vital signs, night sweats, chest pain. No  decrease in your appetite, unexplained weight loss or fatigue.  No other new onset medical symptoms.  Current weight 317lb.  Peripheral IV RAC, 1attempt Pre medicated with Acetaminophen, Diphenhydramine. Labs drawn none. Infusion completed without complication or reaction. Pt reports therapy is effective in managing symptoms related to therapy.   Sherrie Coleman RN  [place] [facility]  [email or web address]  [phone]

## 2024-07-11 ENCOUNTER — VIRTUAL VISIT (OUTPATIENT)
Dept: DERMATOLOGY | Facility: CLINIC | Age: 39
End: 2024-07-11
Payer: COMMERCIAL

## 2024-07-11 DIAGNOSIS — L73.2 HIDRADENITIS SUPPURATIVA: Primary | ICD-10-CM

## 2024-07-11 PROCEDURE — 99442 PR PHYSICIAN TELEPHONE EVALUATION 11-20 MIN: CPT | Mod: 93 | Performed by: DERMATOLOGY

## 2024-07-11 RX ORDER — CHLORHEXIDINE GLUCONATE 40 MG/ML
SOLUTION TOPICAL DAILY PRN
Qty: 473 ML | Refills: 11 | Status: SHIPPED | OUTPATIENT
Start: 2024-07-11

## 2024-07-11 NOTE — PROGRESS NOTES
[facility] Dermatology Note  Encounter Date: Jul 11, 2024  Telephone ([phone]). Location of teledermatologist: [facility] DERMATOLOGY CL[facility]IC [place]. Start time: 8:50pm. End time: 9:03pm    Dermatology Problem List:  FBSE 6/2/22  1. Hidradenitis suppurativa.  - Pt is enrolled in HS registry  - Current recommended tx: infliximab [id number] with 40mg methylpred, ILK to two sites 3/4/21, referred to plastics 10/7/2021   - Labs done 8/2020  - Past tx: has been on ABx for acne (incl Bactrim), clindamycin, rifampin, prednisone, bactrim, methotrexate 15mg (discontinued Summer 2020 due to elevated LFTs)  - Gained a lot of weight recently will hold steroids  - Benzoyl peroxide wash  - If neck lesion still acting up next time will plan for excision  2. Acne vulgaris  - Has tried Accutane and a number of antibiotics  3. Hx of atypical nevus - ~2005 - removed in [place] -patient will bring us records at next visit  - TBSE next in person visit  4. Pilonidal cyst. Previously  Referred to colorectal surgery. Wants to hold off for now     SH:   ____________________________________________    Assessment & Plan:     # Hidradenitis suppurativa, Rodney stage II.  - s/p surgery in rt axilla and neck. New lesions iobn rt axilla (~4), neckl and chest. Largest flare since starting infliximav.   - Will check level prior to next infusioon and increase dose to 7.5mg/kg  - Continue bactrim DS. Refills provided.   -Switch to hibilens wash   - Increase infliximab 57.5mg/kg  - Has follow-up  with nutrition and is on ozempic.   - Referred to PCP to discuss vaccines: recommended shingles, pneumonia, COVID booster     # Pilonidal cyst  - Previously referred to colorectal surgery but wants to hold off for now     # Psoriasiform eruption, b/l inguinal folds  - Continue applying desonide cream twice daily as needed to the affected areas      # Hx of atypical nevus   - Encouraged him to bring in  prior records  - Need TBSE next in person visit     Follow-up: 12 week in person     Dennis Denny MD, FAAD, FACP     Departments of Internal Medicine and Dermatology  [facility]    ____________________________________________    CC: Derm Problem (HS: flaring L neck, L armpit, rib cage/Overall condition is currently stable/He will send pictures within the next hour or so (via "[employer]))    HPI:  Mr. Margarito Sofia is a(n) 38 year old male who presents today for follow-up  for HS.    Last seen by me in clinic on 7/13/23. He was healing well s/p excision of lesions in right axilla and neck, and he was to follow up with Dr. Ivy to discussed additional excision of right axilla. He was to continue infliximab, Bactrim, BPO wash, and following with Nutrition. Also, he was referred to PCP to discuss vaccines while on immunosuppressant. Referred to colorectal surgery for pilonidal cyst. For psoriasiform eruption of bilateral inguinal folds, he was continue desonide BID PRN. For H/O atypical nevus, requested that he bring prior records to next visit, and at that time will also conducted TBSE.    Recently decreased odw to t weighrt loss.  He notes that he flared a bit and now it is calming back down. Still on backtrim. Several. Smaller lesion in rt axilla.     Patient is otherwise feeling well, without additional skin concerns.    HS Nurse Assessment      6/2/2022     4:15 PM 7/13/2023     1:40 PM 7/11/2024     4:13 PM   Nurse Assessment Data   Over the past 30 days how many old lesions flared back up? 2 2 3   Over the past 30 days how many new lesions did you get? 0 0 0   Over the past week, how many dressing changes do you do each day? 0 1 0   Over the past week, has your wound drainage been: Moderate Moderate None   Rate your HS overall from 0 - 10 (0 = no disease, 10 = worst) over the past week:  4-5 2 1   Rate your pain score from 0 - 10 (0 = no disease, 10 = worst) for the most  painful/symptomatic lesion in the past week:  4 0 - No Pain 0 - No Pain   Over the past week, how much has HS influenced your quality of life? not at all not at all slightly     Medications:  Current Outpatient Medications   Medication Sig Dispense Refill    acetaminophen (TYLENOL) 325 MG tablet Take 2 tablets (650 mg) by mouth every 4 hours as needed for other (For optimal non-opioid multimodal pain management to improve pain control.) 90 tablet 0    atomoxetine (STRATTERA) 10 MG capsule Take 1 capsule (10 mg) by mouth daily May increase to 20 mg daily for more benefit after 2-3 weeks 90 capsule 3    benzoyl peroxide (PANOXYL) 10 % external liquid Use daily as directed 148 mL 11    desonide (DESOWEN) 0.05 % external cream APPLY TO RASH IN GROIN TWICE A DAY AS NEEDED FOR RASH 60 g 2    EPINEPHrine (ANY BX GENERIC EQUIV) 0.3 MG/0.3ML injection 2-pack Inject 0.3 mLs (0.3 mg) into the muscle as needed for anaphylaxis May repeat one time in 5-15 minutes if response to initial dose is inadequate. 2 each 3    FLUoxetine (PROZAC) 20 MG capsule Take 1 capsule (20 mg) by mouth daily 90 capsule 3    hydrOXYzine (ATARAX) 25 MG tablet Take 1 tablet (25 mg) by mouth 3 times daily as needed for anxiety 30 tablet 3    Semaglutide, 2 MG/DOSE, (OZEMPIC) 8 MG/3ML pen Inject 2 mg Subcutaneous every 7 days 9 mL 3    sulfamethoxazole-trimethoprim (BACTRIM DS) 800-160 MG tablet Take 1 tablet by mouth 2 times daily 180 tablet 3    inFLIXimab (REMICADE) 100 MG injection 700 mg every 28 days  (Patient not taking: Reported on 7/11/2024)      Semaglutide, 1 MG/DOSE, (OZEMPIC, 1 MG/DOSE,) 4 MG/3ML pen Inject 1.7 mg Subcutaneous every 7 days For additional refills, please schedule a follow-up appointment at [phone], lab due 9 mL 1     Current Facility-Administered Medications   Medication Dose Route Frequency Provider Last Rate Last Admin    lidocaine 1% with EPINEPHrine 1:100,000 injection 3 mL  3 mL Intradermal Once Madeline Peoples,  CLIFFORD        triamcinolone acetonide (KENALOG-10) injection 10 mg  10 mg Intra-Lesional Once Madeline Peoples, CLIFFORD          Past Medical/Surgical History:   Patient Active Problem List   Diagnosis    Hidradenitis suppurativa    Morbid obesity (H)    Diabetes mellitus, type 2 (H)    Myopia     Past Medical History:   Diagnosis Date    Hidradenitis suppurativa     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 Pink indurated plaque on left  neck   - 2 violacelious plaques or nodules on rt chest      CC Carina Gallagher MD  [address] [facility] [address]  [place] [address] on close of this encounter.

## 2024-07-11 NOTE — LETTER
7/11/2024       RE: Margarito Sofia  [address]  [address]  [facility] [address]     Dear Colleague,    Thank you for referring your patient, Margarito Sofia, to the [facility] DERMATOLOGY CLINIC [place] at [facility]. Please see a copy of my visit note below.    [facility] Dermatology Note  Encounter Date: Jul 11, 2024  Telephone ([phone]). Location of teledermatologist: [facility] DERMATOLOGY CL[facility]IC [place]. Start time: 8:50pm. End time: 9:03pm    Dermatology Problem List:  FBSE 6/2/22  1. Hidradenitis suppurativa.  - Pt is enrolled in HS registry  - Current recommended tx: infliximab [id number] with 40mg methylpred, ILK to two sites 3/4/21, referred to plastics 10/7/2021   - Labs done 8/2020  - Past tx: has been on ABx for acne (incl Bactrim), clindamycin, rifampin, prednisone, bactrim, methotrexate 15mg (discontinued Summer 2020 due to elevated LFTs)  - Gained a lot of weight recently will hold steroids  - Benzoyl peroxide wash  - If neck lesion still acting up next time will plan for excision  2. Acne vulgaris  - Has tried Accutane and a number of antibiotics  3. Hx of atypical nevus - ~2005 - removed in [place] -patient will bring us records at next visit  - TBSE next in person visit  4. Pilonidal cyst. Previously  Referred to colorectal surgery. Wants to hold off for now     SH:   ____________________________________________    Assessment & Plan:     # Hidradenitis suppurativRashaun corona stage II.  - s/p surgery in rt axilla and neck. New lesions iobn rt axilla (~4), neckl and chest. Largest flare since starting infliximav.   - Will check level prior to next infusioon and increase dose to 7.5mg/kg  - Continue bactrim DS. Refills provided.   -Switch to hibilens wash   - Increase infliximab 57.5mg/kg  - Has follow-up  with nutrition and is on ozempic.   - Referred to PCP to discuss vaccines:  recommended shingles, pneumonia, COVID booster     # Pilonidal cyst  - Previously referred to colorectal surgery but wants to hold off for now     # Psoriasiform eruption, b/l inguinal folds  - Continue applying desonide cream twice daily as needed to the affected areas      # Hx of atypical nevus   - Encouraged him to bring in prior records  - Need TBSE next in person visit     Follow-up: 12 week in person     Dennis Denny MD, FAAD, FACP     Departments of Internal Medicine and Dermatology  [facility]    ____________________________________________    CC: Derm Problem (HS: flaring L neck, L armpit, rib cage/Overall condition is currently stable/He will send pictures within the next hour or so (via [employer]))    HPI:  Mr. Margarito Sofia is a(n) 38 year old male who presents today for follow-up  for HS.    Last seen by me in clinic on 7/13/23. He was healing well s/p excision of lesions in right axilla and neck, and he was to follow up with Dr. Ivy to discussed additional excision of right axilla. He was to continue infliximab, Bactrim, BPO wash, and following with Nutrition. Also, he was referred to PCP to discuss vaccines while on immunosuppressant. Referred to colorectal surgery for pilonidal cyst. For psoriasiform eruption of bilateral inguinal folds, he was continue desonide BID PRN. For H/O atypical nevus, requested that he bring prior records to next visit, and at that time will also conducted TBSE.    Recently decreased odw to t weighrt loss.  He notes that he flared a bit and now it is calming back down. Still on backtrim. Several. Smaller lesion in rt axilla.     Patient is otherwise feeling well, without additional skin concerns.    HS Nurse Assessment      6/2/2022     4:15 PM 7/13/2023     1:40 PM 7/11/2024     4:13 PM   Nurse Assessment Data   Over the past 30 days how many old lesions flared back up? 2 2 3   Over the past 30 days how many new lesions did you get? 0  0 0   Over the past week, how many dressing changes do you do each day? 0 1 0   Over the past week, has your wound drainage been: Moderate Moderate None   Rate your HS overall from 0 - 10 (0 = no disease, 10 = worst) over the past week:  4-5 2 1   Rate your pain score from 0 - 10 (0 = no disease, 10 = worst) for the most painful/symptomatic lesion in the past week:  4 0 - No Pain 0 - No Pain   Over the past week, how much has HS influenced your quality of life? not at all not at all slightly     Medications:  Current Outpatient Medications   Medication Sig Dispense Refill     acetaminophen (TYLENOL) 325 MG tablet Take 2 tablets (650 mg) by mouth every 4 hours as needed for other (For optimal non-opioid multimodal pain management to improve pain control.) 90 tablet 0     atomoxetine (STRATTERA) 10 MG capsule Take 1 capsule (10 mg) by mouth daily May increase to 20 mg daily for more benefit after 2-3 weeks 90 capsule 3     benzoyl peroxide (PANOXYL) 10 % external liquid Use daily as directed 148 mL 11     desonide (DESOWEN) 0.05 % external cream APPLY TO RASH IN GROIN TWICE A DAY AS NEEDED FOR RASH 60 g 2     EPINEPHrine (ANY BX GENERIC EQUIV) 0.3 MG/0.3ML injection 2-pack Inject 0.3 mLs (0.3 mg) into the muscle as needed for anaphylaxis May repeat one time in 5-15 minutes if response to initial dose is inadequate. 2 each 3     FLUoxetine (PROZAC) 20 MG capsule Take 1 capsule (20 mg) by mouth daily 90 capsule 3     hydrOXYzine (ATARAX) 25 MG tablet Take 1 tablet (25 mg) by mouth 3 times daily as needed for anxiety 30 tablet 3     Semaglutide, 2 MG/DOSE, (OZEMPIC) 8 MG/3ML pen Inject 2 mg Subcutaneous every 7 days 9 mL 3     sulfamethoxazole-trimethoprim (BACTRIM DS) 800-160 MG tablet Take 1 tablet by mouth 2 times daily 180 tablet 3     inFLIXimab (REMICADE) 100 MG injection 700 mg every 28 days  (Patient not taking: Reported on 7/11/2024)       Semaglutide, 1 MG/DOSE, (OZEMPIC, 1 MG/DOSE,) 4 MG/3ML pen Inject 1.7 mg  Subcutaneous every 7 days For additional refills, please schedule a follow-up appointment at [phone], lab due 9 mL 1     Current Facility-Administered Medications   Medication Dose Route Frequency Provider Last Rate Last Admin     lidocaine 1% with EPINEPHrine 1:100,000 injection 3 mL  3 mL Intradermal Once Madeline Peoples PA-C         triamcinolone acetonide (KENALOG-10) injection 10 mg  10 mg Intra-Lesional Once Madeline Peoples PA-C          Past Medical/Surgical History:   Patient Active Problem List   Diagnosis     Hidradenitis suppurativa     Morbid obesity (H)     Diabetes mellitus, type 2 (H)     Myopia     Past Medical History:   Diagnosis Date     Hidradenitis suppurativa      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 Pink indurated plaque on left  neck   - 2 violacelious plaques or nodules on rt chest      CC Carina Gallagher MD  [address] [address] on close of this encounter.      Again, thank you for allowing me to participate in the care of your patient.      Sincerely,    Dennis Denny MD

## 2024-07-11 NOTE — NURSING NOTE
Dermatology Rooming Note    Margarito Sofia's goals for this visit include:   Chief Complaint   Patient presents with    Derm Problem     HS: flaring L neck, L armpit, rib cage  Overall condition is currently stable     Sol Alcazar LPN

## 2024-07-13 ENCOUNTER — HEALTH MAINTENANCE LETTER (OUTPATIENT)
Age: 39
End: 2024-07-13

## 2024-07-18 ENCOUNTER — TELEPHONE (OUTPATIENT)
Dept: DERMATOLOGY | Facility: CLINIC | Age: 39
End: 2024-07-18
Payer: COMMERCIAL

## 2024-07-18 NOTE — TELEPHONE ENCOUNTER
Left Voicemail (1st Attempt) and Sent Mychart (1st Attempt) for the patient to call back and schedule the following:    Appointment type: FOLLOW UP    Provider: fransisco    Return date: 10/24     Specialty phone number: [phone]    Additonal Notes: na

## 2024-07-31 DIAGNOSIS — F43.22 ADJUSTMENT DISORDER WITH ANXIOUS MOOD: Primary | ICD-10-CM

## 2024-08-02 ENCOUNTER — LAB REQUISITION (OUTPATIENT)
Dept: LAB | Facility: CLINIC | Age: 39
End: 2024-08-02
Payer: COMMERCIAL

## 2024-08-02 ENCOUNTER — DOCUMENTATION ONLY (OUTPATIENT)
Dept: PHARMACY | Facility: CLINIC | Age: 39
End: 2024-08-02
Payer: COMMERCIAL

## 2024-08-02 DIAGNOSIS — L73.2 HIDRADENITIS SUPPURATIVA: ICD-10-CM

## 2024-08-02 PROCEDURE — 82542 COL CHROMOTOGRAPHY QUAL/QUAN: CPT | Performed by: DERMATOLOGY

## 2024-08-07 ENCOUNTER — OFFICE VISIT (OUTPATIENT)
Dept: INTERNAL MEDICINE | Facility: CLINIC | Age: 39
End: 2024-08-07
Payer: COMMERCIAL

## 2024-08-07 ENCOUNTER — LAB (OUTPATIENT)
Dept: LAB | Facility: CLINIC | Age: 39
End: 2024-08-07
Payer: COMMERCIAL

## 2024-08-07 VITALS
OXYGEN SATURATION: 99 % | DIASTOLIC BLOOD PRESSURE: 91 MMHG | SYSTOLIC BLOOD PRESSURE: 142 MMHG | HEART RATE: 75 BPM | HEIGHT: 72 IN | BODY MASS INDEX: 42.66 KG/M2 | WEIGHT: 315 LBS

## 2024-08-07 DIAGNOSIS — E11.9 TYPE 2 DIABETES MELLITUS WITHOUT COMPLICATION, WITHOUT LONG-TERM CURRENT USE OF INSULIN (H): Primary | ICD-10-CM

## 2024-08-07 DIAGNOSIS — S29.012A STRAIN OF RHOMBOID MUSCLE, INITIAL ENCOUNTER: ICD-10-CM

## 2024-08-07 DIAGNOSIS — E11.9 DIABETES MELLITUS, TYPE 2 (H): ICD-10-CM

## 2024-08-07 DIAGNOSIS — L73.2 HIDRADENITIS SUPPURATIVA: ICD-10-CM

## 2024-08-07 LAB
ANION GAP SERPL CALCULATED.3IONS-SCNC: 9 MMOL/L (ref 7–15)
BUN SERPL-MCNC: 7.5 MG/DL (ref 6–20)
CALCIUM SERPL-MCNC: 9.3 MG/DL (ref 8.8–10.4)
CHLORIDE SERPL-SCNC: 101 MMOL/L (ref 98–107)
CHOLEST SERPL-MCNC: 159 MG/DL
CREAT SERPL-MCNC: 1 MG/DL (ref 0.67–1.17)
EGFRCR SERPLBLD CKD-EPI 2021: >90 ML/MIN/1.73M2
FASTING STATUS PATIENT QL REPORTED: ABNORMAL
FASTING STATUS PATIENT QL REPORTED: NORMAL
GLUCOSE SERPL-MCNC: 89 MG/DL (ref 70–99)
HBA1C MFR BLD: 5.5 %
HCO3 SERPL-SCNC: 27 MMOL/L (ref 22–29)
HDLC SERPL-MCNC: 39 MG/DL
INFLIXIMAB AB SERPL IA-MCNC: <3.1 U/ML
INFLIXIMAB SERPL-MCNC: 11.3 UG/ML
LDLC SERPL CALC-MCNC: 85 MG/DL
NONHDLC SERPL-MCNC: 120 MG/DL
POTASSIUM SERPL-SCNC: 4.2 MMOL/L (ref 3.4–5.3)
SODIUM SERPL-SCNC: 137 MMOL/L (ref 135–145)
TRIGL SERPL-MCNC: 173 MG/DL

## 2024-08-07 PROCEDURE — 99214 OFFICE O/P EST MOD 30 MIN: CPT | Performed by: INTERNAL MEDICINE

## 2024-08-07 PROCEDURE — 83036 HEMOGLOBIN GLYCOSYLATED A1C: CPT | Performed by: INTERNAL MEDICINE

## 2024-08-07 PROCEDURE — 99000 SPECIMEN HANDLING OFFICE-LAB: CPT | Performed by: PATHOLOGY

## 2024-08-07 PROCEDURE — 80061 LIPID PANEL: CPT | Performed by: PATHOLOGY

## 2024-08-07 PROCEDURE — 36415 COLL VENOUS BLD VENIPUNCTURE: CPT | Performed by: PATHOLOGY

## 2024-08-07 PROCEDURE — 80048 BASIC METABOLIC PNL TOTAL CA: CPT | Performed by: PATHOLOGY

## 2024-08-07 RX ORDER — CYCLOBENZAPRINE HCL 10 MG
10 TABLET ORAL 3 TIMES DAILY PRN
Qty: 30 TABLET | Refills: 5 | Status: SHIPPED | OUTPATIENT
Start: 2024-08-07

## 2024-08-07 ASSESSMENT — PATIENT HEALTH QUESTIONNAIRE - PHQ9
10. IF YOU CHECKED OFF ANY PROBLEMS, HOW DIFFICULT HAVE THESE PROBLEMS MADE IT FOR YOU TO DO YOUR WORK, TAKE CARE OF THINGS AT HOME, OR GET ALONG WITH OTHER PEOPLE: NOT DIFFICULT AT ALL
SUM OF ALL RESPONSES TO PHQ QUESTIONS 1-9: 5
SUM OF ALL RESPONSES TO PHQ QUESTIONS 1-9: 5

## 2024-08-07 NOTE — PROGRESS NOTES
Subjective   Margarito is a 38 year old, presenting for the following health issues:  Follow Up (Back pain where skin graft taken, upper left back)      8/7/2024     1:43 PM   Additional Questions   Roomed by [facility] EMT     History of Present Illness       Reason for visit:  First in person visit    He eats 2-3 servings of fruits and vegetables daily.He consumes 0 sweetened beverage(s) daily.He exercises with enough effort to increase his heart rate 30 to 60 minutes per day.  He exercises with enough effort to increase his heart rate 3 or less days per week.   He is taking medications regularly.     Margarito is here to follow up metabolic syndrome, obesity, DM, HS.  He has lost about 30 pounds taking semaglutide and he notices his clothes are fitting more loosely.  We discussed continuing the current dose and I emphasized diet, exercise and lifestyle changes were still important in this situation.  We should recheck A1c and also lipid panel in the future.     He feels well in general except he has some left sided upper back tightness near the surgical scar for a skin flap operation that was performed by plastic surgery for hidradenitis suppurativa.  He is interested in talking with plastics again about further revisions in the left axilla.  We also discussed a possible component of MSK strain/spasm and I offered PT and muscle relaxants to help manage symptoms.     Objective    BP (!) 142/91 (BP Location: Right arm, Patient Position: Sitting, Cuff Size: Adult Large)   Pulse 75   Ht 1.829 m (6')   Wt 149.7 kg (330 lb)   SpO2 99%   BMI 44.76 kg/m    Body mass index is 44.76 kg/m .  Physical Exam   Cor RRR  Lungs CTA  Ext no edema  Skin: hyperalgesia noted over the left trapezius area near surgical scar    MSK: tender to palpation over the left lower trapezius and rhomboid muscle areas    Margarito was seen today for follow up of DM, HS, back pain, weight management    Diabetes mellitus, type 2 (H)  -     BASIC METABOLIC  PANEL; Future  -     Lipid panel reflex to direct LDL Non-fasting; Future  -     HEMOGLOBIN A1C; Future    Strain of rhomboid muscle, initial encounter  -     cyclobenzaprine (FLEXERIL) 10 MG tablet; Take 1 tablet (10 mg) by mouth 3 times daily as needed for muscle spasms  -     Physical Therapy  Referral; Future    Hidradenitis suppurativa  -     Adult Plastic Surgery  Referral; Future    Type 2 diabetes mellitus without complication, without long-term current use of insulin (H)  -     Semaglutide, 2 MG/DOSE, (OZEMPIC) 8 MG/3ML pen; Inject 2 mg subcutaneously every 7 days             Signed Electronically by: Carina Gallagher MD

## 2024-08-30 ENCOUNTER — DOCUMENTATION ONLY (OUTPATIENT)
Dept: PHARMACY | Facility: CLINIC | Age: 39
End: 2024-08-30
Payer: COMMERCIAL

## 2024-08-30 NOTE — PROGRESS NOTES
Skilled Nurse visit in the [facility]fusion [facility] to administer Inflectra 1400mg.  No recent elevated temperature, fever, chills, productive cough, coughing for 3 weeks or longer or hemoptysis, abnormal vital signs, night sweats, chest pain. No  decrease in your appetite, unexplained weight loss or fatigue.  No other new onset medical symptoms.  Current weight 322lb.  Peripheral IVright AC, 1attempt Pre medicated with Acetaminophen 650mg po and Diphenhydramine 50mg . Labs drawn none. Infusion completed without complication or reaction. Pt reports therapy is effective in managing symptoms related to therapy.   Sherrie Coleman RN  [facility] infusion  [email or web address]  [phone]

## 2024-09-05 ENCOUNTER — ENROLLMENT (OUTPATIENT)
Dept: HOME HEALTH SERVICES | Facility: HOME HEALTH | Age: 39
End: 2024-09-05
Payer: COMMERCIAL

## 2024-09-27 ENCOUNTER — DOCUMENTATION ONLY (OUTPATIENT)
Dept: HOME HEALTH SERVICES | Facility: HOME HEALTH | Age: 39
End: 2024-09-27

## 2024-09-28 NOTE — PROGRESS NOTES
Skilled Nurse visit in the [facility] Infusion Suite to administer Inflectra 1400 mg IV. No recent elevated temperature, fever, chills, productive cough, coughing for 3 weeks or longer or hemoptysis, abnormal vital signs, night sweats, chest pain. No decrease in appetite, unexplained weight loss or fatigue.  No other new onset medical symptoms.  Current weight 324.8 lbs.  PIV placed in right AC, 1 attempt. Pre medicated with Tylenol 650 mg po and Benadryl 50 mg po. Labs drawn pre-infusion: none ordered. Infusion completed without complication or reaction. Pt reports that the recent increased dosage has been effective in helping to manage symptoms related to therapy.     CONCEPCION LevinN, RN  [phone]  [email or web address]

## 2024-09-30 ENCOUNTER — HOME INFUSION (OUTPATIENT)
Dept: HOME HEALTH SERVICES | Facility: HOME HEALTH | Age: 39
End: 2024-09-30
Payer: COMMERCIAL

## 2024-09-30 VITALS — BODY MASS INDEX: 42.66 KG/M2 | WEIGHT: 315 LBS | HEIGHT: 72 IN

## 2024-09-30 DIAGNOSIS — L73.2 HIDRADENITIS SUPPURATIVA: Primary | ICD-10-CM

## 2024-09-30 RX ORDER — ACETAMINOPHEN 325 MG/1
650 TABLET ORAL
Qty: 2 TABLET | Refills: 0 | Status: ACTIVE | OUTPATIENT
Start: 2024-09-30 | End: 2024-09-30

## 2024-09-30 RX ORDER — ACETAMINOPHEN 325 MG/1
650 TABLET ORAL
Qty: 18 TABLET | Refills: 0 | Status: ACTIVE | OUTPATIENT
Start: 2024-10-23 | End: 2025-07-04

## 2024-09-30 RX ORDER — DIPHENHYDRAMINE HCL 25 MG
50 CAPSULE ORAL
Qty: 18 CAPSULE | Refills: 0 | Status: ACTIVE | OUTPATIENT
Start: 2024-10-23 | End: 2025-07-04

## 2024-09-30 RX ORDER — DIPHENHYDRAMINE HCL 25 MG
50 CAPSULE ORAL
Qty: 2 CAPSULE | Refills: 0 | Status: ACTIVE | OUTPATIENT
Start: 2024-09-30 | End: 2024-09-30

## 2024-10-23 ENCOUNTER — HOME CARE VISIT (OUTPATIENT)
Dept: HOME HEALTH SERVICES | Facility: HOME HEALTH | Age: 39
End: 2024-10-23
Payer: COMMERCIAL

## 2024-10-23 VITALS
SYSTOLIC BLOOD PRESSURE: 140 MMHG | HEART RATE: 96 BPM | TEMPERATURE: 97.7 F | WEIGHT: 315 LBS | RESPIRATION RATE: 18 BRPM | BODY MASS INDEX: 44.77 KG/M2 | DIASTOLIC BLOOD PRESSURE: 78 MMHG | OXYGEN SATURATION: 98 %

## 2024-10-23 PROCEDURE — 99602 HOME NFS VISIT EACH ADDL HR: CPT | Mod: SS

## 2024-10-23 PROCEDURE — 99601 HOME NFS VISIT <2 HRS: CPT | Mod: SS

## 2024-10-23 ASSESSMENT — PAIN SCALES - GENERAL: PAINLEVEL: NO PAIN (0)

## 2024-10-23 NOTE — PROGRESS NOTES
"Infusion Nursing Note:  Skilled nurse visit today for Inflectra 1400mg for Margarito Sofia.   present during visit today: Not Applicable.    Pre-infusion Checklist:   Pre-infusion Checklist    Have you had any delayed reaction since last infusion?   No    Have you recently had an elevated temperature, fever, chills productive cough, coughing for 3 weeks or longer or hemoptysis, abnormal vital signs, night sweats, chest pain, or have you noticed a decrease in your appetite, or noted unexplained weight loss or fatigue?   No    Do you have any open wounds or new incisions?  No    Do you have any recent or upcoming hospitalizations, surgeries, or dental procedures?   No    Do you currently have or recently have had any signs of illness or infection or are you on any antibiotics?   No    Have you had any new, sudden , or worsening abdominal pain?   No    Have you or anyone in your household received a live vaccination in the past 4 weeks?   No    Have you recently been diagnosed with any new nervous system diseases or cancer diagnosis? (i.e., Multiple Sclerosis, Guillain [place], sezures, neurological changes) Are you receiving any form of radiation or chemotherapy?   No    Are you pregnant or breastfeeding, or do you have plans of pregnancy in the future?   No    Have you been having any signs of worsening depression or suicidal ideation?  No    Have there been any other new onset medical symptoms?  No    Did the patient answer \"YES\" to any of the questions above?  No    Will the patient receive a medication that has an order for infusion reaction management?  Yes, and all drugs and supplies are available and none have .    If ordered, has the patient taken pre-medications?  Yes    Plan:   Therapy is appropriate, will proceed with treatment.     Chemotherapy Treatment Conditions:     Intravenous Access:  Peripheral IV placed.    Post Infusion Assessment:  Patient tolerated infusion without incident. "     Note: Pt states his disease areas are stable except the back of his neck which has minor drainage which pt believes is due to recent fast food increase     Next Visit Plan:   November 22nd at 1300-2 days late pt request       Sherrie Coleman RN 10/23/2024

## 2024-11-03 DIAGNOSIS — F43.22 ADJUSTMENT DISORDER WITH ANXIOUS MOOD: ICD-10-CM

## 2024-11-06 NOTE — TELEPHONE ENCOUNTER
FLUoxetine (PROZAC) 20 MG capsule       Last Written Prescription Date:  8/5/24  Last Fill Quantity: 90,   # refills: 0  Last Office Visit : 8/7/24  Future Office visit:  2/7/25  Refilled per protocol  Gaye TAMAYO RN  P Central Nursing/Red Flag Triage & Med Refill Team

## 2024-11-08 DIAGNOSIS — F90.9 ATTENTION DEFICIT HYPERACTIVITY DISORDER (ADHD), UNSPECIFIED ADHD TYPE: ICD-10-CM

## 2024-11-12 ENCOUNTER — MYC REFILL (OUTPATIENT)
Dept: INTERNAL MEDICINE | Facility: CLINIC | Age: 39
End: 2024-11-12
Payer: COMMERCIAL

## 2024-11-12 ENCOUNTER — MYC MEDICAL ADVICE (OUTPATIENT)
Dept: DERMATOLOGY | Facility: CLINIC | Age: 39
End: 2024-11-12
Payer: COMMERCIAL

## 2024-11-12 DIAGNOSIS — F90.9 ATTENTION DEFICIT HYPERACTIVITY DISORDER (ADHD), UNSPECIFIED ADHD TYPE: ICD-10-CM

## 2024-11-12 RX ORDER — ATOMOXETINE 10 MG/1
10 CAPSULE ORAL DAILY
Qty: 90 CAPSULE | Refills: 3 | Status: CANCELLED | OUTPATIENT
Start: 2024-11-12

## 2024-11-14 ENCOUNTER — MYC MEDICAL ADVICE (OUTPATIENT)
Dept: INTERNAL MEDICINE | Facility: CLINIC | Age: 39
End: 2024-11-14
Payer: COMMERCIAL

## 2024-11-14 DIAGNOSIS — F90.9 ATTENTION DEFICIT HYPERACTIVITY DISORDER (ADHD), UNSPECIFIED ADHD TYPE: ICD-10-CM

## 2024-11-15 RX ORDER — ATOMOXETINE 10 MG/1
10 CAPSULE ORAL DAILY
OUTPATIENT
Start: 2024-11-15

## 2024-11-15 RX ORDER — ATOMOXETINE 10 MG/1
20 CAPSULE ORAL DAILY
Qty: 180 CAPSULE | Refills: 3 | Status: SHIPPED | OUTPATIENT
Start: 2024-11-15

## 2024-11-21 ENCOUNTER — HOME INFUSION BILLING (OUTPATIENT)
Dept: HOME HEALTH SERVICES | Facility: HOME HEALTH | Age: 39
End: 2024-11-21
Payer: COMMERCIAL

## 2024-11-22 ENCOUNTER — HOME INFUSION BILLING (OUTPATIENT)
Dept: HOME HEALTH SERVICES | Facility: HOME HEALTH | Age: 39
End: 2024-11-22
Payer: COMMERCIAL

## 2024-11-22 PROCEDURE — S9359 HIT ANTI-TNF PER DIEM: HCPCS

## 2024-11-30 ENCOUNTER — HEALTH MAINTENANCE LETTER (OUTPATIENT)
Age: 39
End: 2024-11-30

## 2024-12-05 ENCOUNTER — OFFICE VISIT (OUTPATIENT)
Dept: DERMATOLOGY | Facility: CLINIC | Age: 39
End: 2024-12-05
Payer: COMMERCIAL

## 2024-12-05 VITALS — BODY MASS INDEX: 44.97 KG/M2 | WEIGHT: 315 LBS

## 2024-12-05 DIAGNOSIS — L73.2 HIDRADENITIS SUPPURATIVA: Primary | ICD-10-CM

## 2024-12-05 ASSESSMENT — PAIN SCALES - GENERAL: PAINLEVEL_OUTOF10: NO PAIN (1)

## 2024-12-05 NOTE — PROGRESS NOTES
[facility] Dermatology Note  Encounter Date: Dec 5, 2024  Office Visit     Dermatology Problem List:  FBSE 6/2/22  1. Hidradenitis suppurativa.  - Pt is enrolled in HS registry  - Current recommended tx: infliximab [id number] with 40mg methylpred, ILK to two sites 3/4/21, referred to plastics 10/7/2021   - Labs done 8/2020; 8/2024  - Past tx: has been on ABx for acne (incl Bactrim), clindamycin, rifampin, prednisone, bactrim, methotrexate 15mg (discontinued Summer 2020 due to elevated LFTs)  - Gained a lot of weight recently will hold steroids  - Benzoyl peroxide wash  - If neck lesion still acting up next time will plan for excision  -  on ozempic.   2. Acne vulgaris  - Has tried Accutane and a number of antibiotics  3. Hx of atypical nevus - ~2005 - removed in [place] -patient will bring us records at next visit  - TBSE next in person visit  4. Pilonidal cyst. Previously  Referred to colorectal surgery. Wants to hold off for now     SH:     ____________________________________________    Assessment & Plan:  # Hidradenitis suppurativa, Rodney stage II.  - The longitudinal plan of care for the diagnosis(es)/condition(s) as documented were addressed during this visit. Due to the added complexity in care, I will continue to support Margarito in the subsequent management and with ongoing continuity of care.  - Patient does have increase in activity as noted on exam. However he has not noted any significant flare and his QoL is improved on 10 mg/kg infliximab. Given this, we will continue current dose and get infliximab levels. Will send plastics referral for left axilla sinus tract excision. Will also start resorcinol cream  - s/p surgery in rt axilla and neck.   - Plastic surgery referrral for sinus tract excision  - Resorcinol cream (sent to compounding pharmacy)  - Will check level prior to next infusioon and increase dose to 7.5mg/kg  - Continue bactrim DS. Refills provided.   -  "continue Hibiclens wash   - Increase infliximab 57.5mg/kg  - Has follow-up  with nutrition and is on ozempic.   - Refer to plastic surgeon for HS surgery  - start resorcinol cream bid until clear  [facility]  Address: [address], [place] [address]  Phone: [phone]      # Pilonidal cyst  - Previously referred to colorectal surgery but wants to hold off for now       Follow-up: 3 month(s) virtually (telephone with photos), or earlier for new or changing lesions    Staff and Scribe:     Scribe Disclosure:   I, Niesha Thomas, am serving as a scribe; to document services personally performed by Dennis Denny MD -based on data collection and the provider's statements to me.     Provider Disclosure:  I agree with above History, Review of Systems, Physical exam and Plan.  I have reviewed the content of the documentation and have edited it as needed. I have personally performed the services documented here and the documentation accurately represents those services and the decisions I have made.      Electronically signed by:  Sky Felton MD  Internal Medicine - Dermatology (PGY-4)     Staff Physician Comments:   I saw and evaluated the patient with the resident and I agree with the assessment and plan.  I was present for the examination.    Dennis Denny MD, FAAD, FACP    Departments of Internal Medicine and Dermatology  [facility]    Provider Disclosure:   The documentation recorded by the scribe accurately reflects the services I personally performed and the decisions made by me.    Dennis Denny MD, FAAD, FACP    Departments of Internal Medicine and Dermatology  [facility]          ____________________________________________    CC: Derm Problem (Left axillary hidradenitis suppurativa and posterior neck surgery in 2021 with Dr. Ivy/CYN current condition: \"going pretty good\", draining in L armpit, back of neck, and " hip)    HPI:  Mr. Margarito Sofia is a(n) 39 year old male who presents today as a return patient for HS. Last seen virtual visit on 7/11/24, was to continue bactrim DS, start hibiclens wash, increase infliximab to 10 mg/kg. Overall improved symptomatically (has had 2 infusions on 10 mg/kg), with no new flare since dose increase. His quality of life is also manageable on this dose.       HS Nurse Assessment        7/13/2023     1:40 PM 7/11/2024     4:13 PM 12/5/2024    12:59 PM   Nurse Assessment Data   Over the past 30 days how many old lesions flared back up? 2 3 2   Over the past 30 days how many new lesions did you get? 0 0 0   Over the past week, how many dressing changes do you do each day? 1 0 0   Over the past week, has your wound drainage been: Moderate None Mild   Rate your HS overall from 0 - 10 (0 = no disease, 10 = worst) over the past week:  2 1 2   Rate your pain score from 0 - 10 (0 = no disease, 10 = worst) for the most painful/symptomatic lesion in the past week:  0 - No Pain 0 - No Pain 1   Over the past week, how much has HS influenced your quality of life? not at all slightly not at all     Labs Reviewed:  N/A    Physical Exam:  Vitals: Wt (!) 150.4 kg (331 lb 9.6 oz)   BMI 44.97 kg/m    SKIN: Full skin, which includes the head/face, both arms, chest, back, abdomen,both legs, genitalia and/or groin buttocks, digits and/or nails, was examined.  - No other lesions of concern on areas examined.   HS Data      6/2/2022     5:07 PM 7/13/2023     1:40 PM 12/5/2024     1:24 PM   HS Exam Data   LC Type  LC1    Clinical Subtypes  Regular type Regular type   Acne? No Yes No   Dissecting Cellulitis? No Yes No   Visual analogue score (0-100)  30    Total Rodney Stage II  II   Total Inflammatory Nodules 3 6 10   Total Abcesses 0 0 1   Total Draining Tunnels 1 2 3   Total Abscess and Nodule Count 3 6 11   IHS4 Score  7 14 24   Total  HASI Score 21 36 61   HS-PGA  3      Medications:  Current Outpatient  "Medications   Medication Sig Dispense Refill    acetaminophen (TYLENOL) 325 MG tablet Take 2 tablets (650 mg) by mouth every 4 weeks. Administer 30-60 minutes prior to infusion 18 tablet 0    acetaminophen (TYLENOL) 325 MG tablet Take 2 tablets (650 mg) by mouth every 4 hours as needed for other (For optimal non-opioid multimodal pain management to improve pain control.) 90 tablet 0    atomoxetine (STRATTERA) 10 MG capsule Take 2 capsules (20 mg) by mouth daily. 180 capsule 3    chlorhexidine (HIBICLENS) 4 % solution Apply topically daily as needed for wound care 473 mL 11    COMPOUNDED NON-CONTROLLED SUBSTANCE (CMPD RX) - PHARMACY TO MIX COMPOUNDED MEDICATION Resorcinol 15% in vaseline - apply twice daily as needed at onset of flare. 30 g 11    cyclobenzaprine (FLEXERIL) 10 MG tablet Take 1 tablet (10 mg) by mouth 3 times daily as needed for muscle spasms 30 tablet 5    desonide (DESOWEN) 0.05 % external cream APPLY TO RASH IN GROIN TWICE A DAY AS NEEDED FOR RASH 60 g 2    diphenhydrAMINE (BENADRYL) 25 MG capsule Take 2 capsules (50 mg) by mouth every 4 weeks. Administer 30-60 minutes prior to infusion. 18 capsule 0    Emergency Supply Kit, PIV, Patient use for emergency only. Contents: 3 sodium chloride 0.9% flushes, 1 IV start kit, 1 microclave ext set 14\", 1 each IV Cath 22 G/1\" and 24G/3/4\", 6 alcohol prep pads, 4 nitrile gloves (med). Call [phone] to reorder. 062789 kit 0    EPINEPHrine (ANY BX GENERIC EQUIV) 0.3 MG/0.3ML injection 2-pack Inject 0.3 mLs (0.3 mg) into the muscle as needed for anaphylaxis May repeat one time in 5-15 minutes if response to initial dose is inadequate. 2 each 3    FLUoxetine (PROZAC) 20 MG capsule TAKE 1 CAPSULE BY MOUTH EVERY DAY 90 capsule 2    hydrOXYzine (ATARAX) 25 MG tablet Take 1 tablet (25 mg) by mouth 3 times daily as needed for anxiety 30 tablet 3    inFLIXimab-dyyb (INFLECTRA) 1,400 mg in sodium chloride 0.9 % 500 mL via gravity infusion Infuse 1,400 mg over 1 " hours into the vein every 4 weeks. Flush bag with 20 mL NS to flush tubing. 605446 mL 0    Semaglutide, 2 MG/DOSE, (OZEMPIC) 8 MG/3ML pen Inject 2 mg subcutaneously every 7 days 9 mL 3    sodium chloride, PF, 0.9% PF flush Inject 10 mLs into the vein as needed for line flush. Flush IV before and after medication administration as directed and/or at least every 12 hours. 304160 mL 0    sulfamethoxazole-trimethoprim (BACTRIM DS) 800-160 MG tablet Take 1 tablet by mouth 2 times daily 180 tablet 3    inFLIXimab (REMICADE) 100 MG injection 700 mg every 28 days  (Patient not taking: Reported on 12/5/2024)       Current Facility-Administered Medications   Medication Dose Route Frequency Provider Last Rate Last Admin    lidocaine 1% with EPINEPHrine 1:100,000 injection 3 mL  3 mL Intradermal Once Madeline Peoples PA-C        triamcinolone acetonide (KENALOG-10) injection 10 mg  10 mg Intra-Lesional Once Madeline Peoples PA-C          Past Medical History:   Patient Active Problem List   Diagnosis    Hidradenitis suppurativa    Morbid obesity (H)    Diabetes mellitus, type 2 (H)    Myopia     Past Medical History:   Diagnosis Date    Hidradenitis suppurativa     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    Full term, NVD without any complications

## 2024-12-05 NOTE — PATIENT INSTRUCTIONS
Start resorcinol cream bid  [facility]  Address: [address] [place] [address], [place] [address]  Phone: [phone]

## 2024-12-05 NOTE — LETTER
12/5/2024       RE: Margarito Sofia  [address]  [address]  [facility] [address]     Dear Colleague,    Thank you for referring your patient, Margarito Sofia, to the [facility] [place] at [facility]. Please see a copy of my visit note below.    [facility] Dermatology Note  Encounter Date: Dec 5, 2024  Office Visit     Dermatology Problem List:  FBSE 6/2/22  1. Hidradenitis suppurativa.  - Pt is enrolled in HS registry  - Current recommended tx: infliximab [id number] with 40mg methylpred, ILK to two sites 3/4/21, referred to plastics 10/7/2021   - Labs done 8/2020; 8/2024  - Past tx: has been on ABx for acne (incl Bactrim), clindamycin, rifampin, prednisone, bactrim, methotrexate 15mg (discontinued Summer 2020 due to elevated LFTs)  - Gained a lot of weight recently will hold steroids  - Benzoyl peroxide wash  - If neck lesion still acting up next time will plan for excision  -  on ozempic.   2. Acne vulgaris  - Has tried Accutane and a number of antibiotics  3. Hx of atypical nevus - ~2005 - removed in [place] -patient will bring us records at next visit  - TBSE next in person visit  4. Pilonidal cyst. Previously  Referred to colorectal surgery. Wants to hold off for now     SH:     ____________________________________________    Assessment & Plan:  # Hidradenitis suppurativa, Rodney stage II.  - The longitudinal plan of care for the diagnosis(es)/condition(s) as documented were addressed during this visit. Due to the added complexity in care, I will continue to support Margarito in the subsequent management and with ongoing continuity of care.  - Patient does have increase in activity as noted on exam. However he has not noted any significant flare and his QoL is improved on 10 mg/kg infliximab. Given this, we will continue current dose and get infliximab levels. Will send plastics referral for  left axilla sinus tract excision. Will also start resorcinol cream  - s/p surgery in rt axilla and neck.   - Plastic surgery referrral for sinus tract excision  - Resorcinol cream (sent to compounding pharmacy)  - Will check level prior to next infusioon and increase dose to 7.5mg/kg  - Continue bactrim DS. Refills provided.   - continue Hibiclens wash   - Increase infliximab 57.5mg/kg  - Has follow-up  with nutrition and is on ozempic.   - Refer to plastic surgeon for HS surgery  - start resorcinol cream bid until clear  [facility]  Address: [address] [place] [address], [place] [address]  Phone: [phone]      # Pilonidal cyst  - Previously referred to colorectal surgery but wants to hold off for now       Follow-up: 3 month(s) virtually (telephone with photos), or earlier for new or changing lesions    Staff and Scribe:     Scribe Disclosure:   I, Niesha Guzman, am serving as a scribe; to document services personally performed by Dennis Denny MD -based on data collection and the provider's statements to me.     Provider Disclosure:  I agree with above History, Review of Systems, Physical exam and Plan.  I have reviewed the content of the documentation and have edited it as needed. I have personally performed the services documented here and the documentation accurately represents those services and the decisions I have made.      Electronically signed by:  Sky Felton MD  Internal Medicine - Dermatology (PGY-4)     Staff Physician Comments:   I saw and evaluated the patient with the resident and I agree with the assessment and plan.  I was present for the examination.    Dennis Denny MD, FAAD, FACP    Departments of Internal Medicine and Dermatology  [facility]    Provider Disclosure:   The documentation recorded by the scribe accurately reflects the services I personally performed and the decisions made by me.    Dennis Denny MD, FAAMARCOS, FACP  Associate  "Professor  Departments of Internal Medicine and Dermatology  [facility]          ____________________________________________    CC: Derm Problem (Left axillary hidradenitis suppurativa and posterior neck surgery in 2021 with Dr. Ivy/HS current condition: \"going pretty good\", draining in L armpit, back of neck, and hip)    HPI:  Mr. Margarito Sofia is a(n) 39 year old male who presents today as a return patient for HS. Last seen virtual visit on 7/11/24, was to continue bactrim DS, start hibiclens wash, increase infliximab to 10 mg/kg. Overall improved symptomatically (has had 2 infusions on 10 mg/kg), with no new flare since dose increase. His quality of life is also manageable on this dose.       HS Nurse Assessment        7/13/2023     1:40 PM 7/11/2024     4:13 PM 12/5/2024    12:59 PM   Nurse Assessment Data   Over the past 30 days how many old lesions flared back up? 2 3 2   Over the past 30 days how many new lesions did you get? 0 0 0   Over the past week, how many dressing changes do you do each day? 1 0 0   Over the past week, has your wound drainage been: Moderate None Mild   Rate your HS overall from 0 - 10 (0 = no disease, 10 = worst) over the past week:  2 1 2   Rate your pain score from 0 - 10 (0 = no disease, 10 = worst) for the most painful/symptomatic lesion in the past week:  0 - No Pain 0 - No Pain 1   Over the past week, how much has HS influenced your quality of life? not at all slightly not at all     Labs Reviewed:  N/A    Physical Exam:  Vitals: Wt (!) 150.4 kg (331 lb 9.6 oz)   BMI 44.97 kg/m    SKIN: Full skin, which includes the head/face, both arms, chest, back, abdomen,both legs, genitalia and/or groin buttocks, digits and/or nails, was examined.  - No other lesions of concern on areas examined.   HS Data      6/2/2022     5:07 PM 7/13/2023     1:40 PM 12/5/2024     1:24 PM   HS Exam Data   LC Type  LC1    Clinical Subtypes  Regular type Regular type   Acne? No Yes No " "  Dissecting Cellulitis? No Yes No   Visual analogue score (0-100)  30    Total Rodney Stage II  II   Total Inflammatory Nodules 3 6 10   Total Abcesses 0 0 1   Total Draining Tunnels 1 2 3   Total Abscess and Nodule Count 3 6 11   IHS4 Score  7 14 24   Total  HASI Score 21 36 61   HS-PGA  3      Medications:  Current Outpatient Medications   Medication Sig Dispense Refill     acetaminophen (TYLENOL) 325 MG tablet Take 2 tablets (650 mg) by mouth every 4 weeks. Administer 30-60 minutes prior to infusion 18 tablet 0     acetaminophen (TYLENOL) 325 MG tablet Take 2 tablets (650 mg) by mouth every 4 hours as needed for other (For optimal non-opioid multimodal pain management to improve pain control.) 90 tablet 0     atomoxetine (STRATTERA) 10 MG capsule Take 2 capsules (20 mg) by mouth daily. 180 capsule 3     chlorhexidine (HIBICLENS) 4 % solution Apply topically daily as needed for wound care 473 mL 11     COMPOUNDED NON-CONTROLLED SUBSTANCE (CMPD RX) - PHARMACY TO MIX COMPOUNDED MEDICATION Resorcinol 15% in vaseline - apply twice daily as needed at onset of flare. 30 g 11     cyclobenzaprine (FLEXERIL) 10 MG tablet Take 1 tablet (10 mg) by mouth 3 times daily as needed for muscle spasms 30 tablet 5     desonide (DESOWEN) 0.05 % external cream APPLY TO RASH IN GROIN TWICE A DAY AS NEEDED FOR RASH 60 g 2     diphenhydrAMINE (BENADRYL) 25 MG capsule Take 2 capsules (50 mg) by mouth every 4 weeks. Administer 30-60 minutes prior to infusion. 18 capsule 0     Emergency Supply Kit, PIV, Patient use for emergency only. Contents: 3 sodium chloride 0.9% flushes, 1 IV start kit, 1 microclave ext set 14\", 1 each IV Cath 22 G/1\" and 24G/3/4\", 6 alcohol prep pads, 4 nitrile gloves (med). Call [phone] to reorder. [id number] kit 0     EPINEPHrine (ANY BX GENERIC EQUIV) 0.3 MG/0.3ML injection 2-pack Inject 0.3 mLs (0.3 mg) into the muscle as needed for anaphylaxis May repeat one time in 5-15 minutes if response to initial dose is " inadequate. 2 each 3     FLUoxetine (PROZAC) 20 MG capsule TAKE 1 CAPSULE BY MOUTH EVERY DAY 90 capsule 2     hydrOXYzine (ATARAX) 25 MG tablet Take 1 tablet (25 mg) by mouth 3 times daily as needed for anxiety 30 tablet 3     inFLIXimab-dyyb (INFLECTRA) 1,400 mg in sodium chloride 0.9 % 500 mL via gravity infusion Infuse 1,400 mg over 1 hours into the vein every 4 weeks. Flush bag with 20 mL NS to flush tubing. 099171 mL 0     Semaglutide, 2 MG/DOSE, (OZEMPIC) 8 MG/3ML pen Inject 2 mg subcutaneously every 7 days 9 mL 3     sodium chloride, PF, 0.9% PF flush Inject 10 mLs into the vein as needed for line flush. Flush IV before and after medication administration as directed and/or at least every 12 hours. 666303 mL 0     sulfamethoxazole-trimethoprim (BACTRIM DS) 800-160 MG tablet Take 1 tablet by mouth 2 times daily 180 tablet 3     inFLIXimab (REMICADE) 100 MG injection 700 mg every 28 days  (Patient not taking: Reported on 12/5/2024)       Current Facility-Administered Medications   Medication Dose Route Frequency Provider Last Rate Last Admin     lidocaine 1% with EPINEPHrine 1:100,000 injection 3 mL  3 mL Intradermal Once Madeline Peoples PA-C         triamcinolone acetonide (KENALOG-10) injection 10 mg  10 mg Intra-Lesional Once Madeline Peoples PA-C          Past Medical History:   Patient Active Problem List   Diagnosis     Hidradenitis suppurativa     Morbid obesity (H)     Diabetes mellitus, type 2 (H)     Myopia     Past Medical History:   Diagnosis Date     Hidradenitis suppurativa      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         Again, thank you for allowing me to participate in the care of your patient.      Sincerely,    Dennis Denny MD

## 2024-12-05 NOTE — NURSING NOTE
"Dermatology Rooming Note    Margarito Sofia's goals for this visit include:   Chief Complaint   Patient presents with    Derm Problem     Left axillary hidradenitis suppurativa and posterior neck surgery in 2021 with Dr. Cata ADDISON current condition: \"going pretty good\", draining in L armpit, back of neck, and hip     Sol Alcazar LPN    "

## 2024-12-18 ENCOUNTER — HOME INFUSION (OUTPATIENT)
Dept: HOME HEALTH SERVICES | Facility: HOME HEALTH | Age: 39
End: 2024-12-18
Payer: COMMERCIAL

## 2024-12-19 ENCOUNTER — HOME INFUSION (OUTPATIENT)
Dept: HOME HEALTH SERVICES | Facility: HOME HEALTH | Age: 39
End: 2024-12-19
Payer: COMMERCIAL

## 2024-12-19 ENCOUNTER — HOME INFUSION BILLING (OUTPATIENT)
Dept: HOME HEALTH SERVICES | Facility: HOME HEALTH | Age: 39
End: 2024-12-19
Payer: COMMERCIAL

## 2024-12-20 ENCOUNTER — HOME INFUSION BILLING (OUTPATIENT)
Dept: HOME HEALTH SERVICES | Facility: HOME HEALTH | Age: 39
End: 2024-12-20
Payer: COMMERCIAL

## 2025-01-01 ENCOUNTER — EPISODE UPDATE (OUTPATIENT)
Dept: HOME HEALTH SERVICES | Facility: HOME HEALTH | Age: 40
End: 2025-01-01
Payer: COMMERCIAL

## 2025-01-08 ENCOUNTER — EPISODE UPDATE (OUTPATIENT)
Dept: HOME HEALTH SERVICES | Facility: HOME HEALTH | Age: 40
End: 2025-01-08
Payer: COMMERCIAL

## 2025-01-08 NOTE — PROGRESS NOTES
return call from [facility] is BOLD by [facility] [employer] Policy ID  Member [id number]

## 2025-01-08 NOTE — PROGRESS NOTES
return call from [facility] is BOLD by [facility] [employer] Policy ID  Member [id number]

## 2025-01-15 ENCOUNTER — EPISODE UPDATE (OUTPATIENT)
Dept: HOME HEALTH SERVICES | Facility: HOME HEALTH | Age: 40
End: 2025-01-15
Payer: COMMERCIAL

## 2025-01-18 ENCOUNTER — HOME INFUSION BILLING (OUTPATIENT)
Dept: HOME HEALTH SERVICES | Facility: HOME HEALTH | Age: 40
End: 2025-01-18
Payer: COMMERCIAL

## 2025-01-18 ENCOUNTER — HOME CARE VISIT (OUTPATIENT)
Dept: HOME HEALTH SERVICES | Facility: HOME HEALTH | Age: 40
End: 2025-01-18
Payer: COMMERCIAL

## 2025-01-18 VITALS
WEIGHT: 315 LBS | SYSTOLIC BLOOD PRESSURE: 119 MMHG | HEART RATE: 90 BPM | TEMPERATURE: 97.7 F | RESPIRATION RATE: 16 BRPM | DIASTOLIC BLOOD PRESSURE: 80 MMHG | BODY MASS INDEX: 43.2 KG/M2 | OXYGEN SATURATION: 98 %

## 2025-01-18 PROCEDURE — 99602 HOME NFS VISIT EACH ADDL HR: CPT | Mod: SS

## 2025-01-18 PROCEDURE — S9359 HIT ANTI-TNF PER DIEM: HCPCS

## 2025-01-18 PROCEDURE — 99601 HOME NFS VISIT <2 HRS: CPT | Mod: SS

## 2025-01-18 NOTE — PROGRESS NOTES
"Infusion Nursing Note:  Skilled nurse visit today for inflectra for Margarito M Bismark.   present during visit today: Not Applicable.    Pre-infusion Checklist:   Pre-infusion Checklist    Have you had any delayed reaction since last infusion?   No    Have you recently had an elevated temperature, fever, chills productive cough, coughing for 3 weeks or longer or hemoptysis, abnormal vital signs, night sweats, chest pain, or have you noticed a decrease in your appetite, or noted unexplained weight loss or fatigue?   No    Do you have any open wounds or new incisions?  No    Do you have any recent or upcoming hospitalizations, surgeries, or dental procedures?   No    Do you currently have or recently have had any signs of illness or infection or are you on any antibiotics?   No    Have you had any new, sudden , or worsening abdominal pain?   No    Have you or anyone in your household received a live vaccination in the past 4 weeks?   No    Have you recently been diagnosed with any new nervous system diseases or cancer diagnosis? (i.e., Multiple Sclerosis, Guillain [place], sezures, neurological changes) Are you receiving any form of radiation or chemotherapy?   No    Are you pregnant or breastfeeding, or do you have plans of pregnancy in the future?   No    Have you been having any signs of worsening depression or suicidal ideation?  No    Have there been any other new onset medical symptoms?  No    Did the patient answer \"YES\" to any of the questions above?  No    Will the patient receive a medication that has an order for infusion reaction management?  Yes, and all drugs and supplies are available and none have .    If ordered, has the patient taken pre-medications?  Yes    Plan:   Therapy is appropriate, will proceed with treatment.     Chemotherapy Treatment Conditions:   Not Applicable    Intravenous Access:  Peripheral IV placed.    Post Infusion Assessment:  Patient tolerated infusion without " incident.  Blood return noted pre and post infusion.  Access discontinued per protocol.       Note: patient says he s been feeling better since last infusion. Just has one flare to back of left neck.     PIV was placed. 10 mL normal saline used    Pre-medication given:     acetaminophen 650 mg given by mouth at 1035  diphenhydramine 50 mg given by mouth at 1035     20 mL normal saline added to end of infusion.     Saline administered (in mLs): 30    Next Visit Plan:   February 14th at 1:00pm. 1 day early to get back on Friday schedule.       Donna Johnson, RN 1/18/2025

## 2025-02-11 ENCOUNTER — HOME INFUSION (OUTPATIENT)
Dept: HOME HEALTH SERVICES | Facility: HOME HEALTH | Age: 40
End: 2025-02-11
Payer: COMMERCIAL

## 2025-02-12 ENCOUNTER — HOME INFUSION (OUTPATIENT)
Dept: HOME HEALTH SERVICES | Facility: HOME HEALTH | Age: 40
End: 2025-02-12
Payer: COMMERCIAL

## 2025-02-13 ENCOUNTER — HOME INFUSION BILLING (OUTPATIENT)
Dept: HOME HEALTH SERVICES | Facility: HOME HEALTH | Age: 40
End: 2025-02-13
Payer: COMMERCIAL

## 2025-02-14 ENCOUNTER — HOME INFUSION BILLING (OUTPATIENT)
Dept: HOME HEALTH SERVICES | Facility: HOME HEALTH | Age: 40
End: 2025-02-14
Payer: COMMERCIAL

## 2025-03-07 ENCOUNTER — HOME INFUSION (OUTPATIENT)
Dept: HOME HEALTH SERVICES | Facility: HOME HEALTH | Age: 40
End: 2025-03-07
Payer: COMMERCIAL

## 2025-03-09 ENCOUNTER — HEALTH MAINTENANCE LETTER (OUTPATIENT)
Age: 40
End: 2025-03-09

## 2025-03-13 ENCOUNTER — HOME INFUSION BILLING (OUTPATIENT)
Dept: HOME HEALTH SERVICES | Facility: HOME HEALTH | Age: 40
End: 2025-03-13
Payer: COMMERCIAL

## 2025-03-14 ENCOUNTER — HOME INFUSION BILLING (OUTPATIENT)
Dept: HOME HEALTH SERVICES | Facility: HOME HEALTH | Age: 40
End: 2025-03-14
Payer: COMMERCIAL

## 2025-03-14 PROCEDURE — S9359 HIT ANTI-TNF PER DIEM: HCPCS

## 2025-04-08 ENCOUNTER — HOME INFUSION (OUTPATIENT)
Dept: HOME HEALTH SERVICES | Facility: HOME HEALTH | Age: 40
End: 2025-04-08
Payer: COMMERCIAL

## 2025-04-10 ENCOUNTER — HOME INFUSION BILLING (OUTPATIENT)
Dept: HOME HEALTH SERVICES | Facility: HOME HEALTH | Age: 40
End: 2025-04-10
Payer: COMMERCIAL

## 2025-04-11 ENCOUNTER — HOME INFUSION BILLING (OUTPATIENT)
Dept: HOME HEALTH SERVICES | Facility: HOME HEALTH | Age: 40
End: 2025-04-11
Payer: COMMERCIAL

## 2025-04-11 PROCEDURE — S9359 HIT ANTI-TNF PER DIEM: HCPCS

## 2025-05-06 ENCOUNTER — HOME INFUSION (OUTPATIENT)
Dept: HOME HEALTH SERVICES | Facility: HOME HEALTH | Age: 40
End: 2025-05-06
Payer: COMMERCIAL

## 2025-05-08 ENCOUNTER — HOME INFUSION BILLING (OUTPATIENT)
Dept: HOME HEALTH SERVICES | Facility: HOME HEALTH | Age: 40
End: 2025-05-08
Payer: COMMERCIAL

## 2025-05-09 ENCOUNTER — HOME INFUSION BILLING (OUTPATIENT)
Dept: HOME HEALTH SERVICES | Facility: HOME HEALTH | Age: 40
End: 2025-05-09
Payer: COMMERCIAL

## 2025-05-09 PROCEDURE — S9359 HIT ANTI-TNF PER DIEM: HCPCS

## 2025-05-28 ENCOUNTER — HOME INFUSION (OUTPATIENT)
Dept: HOME HEALTH SERVICES | Facility: HOME HEALTH | Age: 40
End: 2025-05-28
Payer: COMMERCIAL

## 2025-06-03 DIAGNOSIS — L73.2 HIDRADENITIS SUPPURATIVA: Primary | ICD-10-CM

## 2025-06-10 ENCOUNTER — EPISODE UPDATE (OUTPATIENT)
Dept: HOME HEALTH SERVICES | Facility: HOME HEALTH | Age: 40
End: 2025-06-10
Payer: COMMERCIAL

## 2025-06-10 DIAGNOSIS — L73.2 HIDRADENITIS SUPPURATIVA: ICD-10-CM

## 2025-06-10 RX ORDER — ACETAMINOPHEN 325 MG/1
650 TABLET ORAL
Qty: 999 TABLET | Refills: 0 | Status: DISPENSED | OUTPATIENT
Start: 2025-06-10 | End: 2026-06-03

## 2025-06-10 RX ORDER — DIPHENHYDRAMINE HCL 25 MG
50 CAPSULE ORAL
Qty: 9999 CAPSULE | Refills: 0 | Status: DISPENSED | OUTPATIENT
Start: 2025-06-10 | End: 2026-06-03

## 2025-06-12 ENCOUNTER — HOME INFUSION BILLING (OUTPATIENT)
Dept: HOME HEALTH SERVICES | Facility: HOME HEALTH | Age: 40
End: 2025-06-12
Payer: COMMERCIAL

## 2025-06-13 ENCOUNTER — HOME INFUSION BILLING (OUTPATIENT)
Dept: HOME HEALTH SERVICES | Facility: HOME HEALTH | Age: 40
End: 2025-06-13
Payer: COMMERCIAL

## 2025-06-13 PROCEDURE — S9359 HIT ANTI-TNF PER DIEM: HCPCS

## 2025-06-18 DIAGNOSIS — L73.2 HIDRADENITIS SUPPURATIVA: ICD-10-CM

## 2025-06-28 ENCOUNTER — HEALTH MAINTENANCE LETTER (OUTPATIENT)
Age: 40
End: 2025-06-28

## 2025-07-10 ENCOUNTER — HOME INFUSION BILLING (OUTPATIENT)
Dept: HOME HEALTH SERVICES | Facility: HOME HEALTH | Age: 40
End: 2025-07-10
Payer: COMMERCIAL

## 2025-07-11 ENCOUNTER — HOME INFUSION BILLING (OUTPATIENT)
Dept: HOME HEALTH SERVICES | Facility: HOME HEALTH | Age: 40
End: 2025-07-11
Payer: COMMERCIAL

## 2025-07-19 ENCOUNTER — HEALTH MAINTENANCE LETTER (OUTPATIENT)
Age: 40
End: 2025-07-19

## 2025-07-21 NOTE — TELEPHONE ENCOUNTER
Prior Authorization Approval    Authorization Effective Date: 2/10/2022  Authorization Expiration Date: 8/8/2022  Medication: REMICADE 800 mg IV Every 4 weeks  Approved Dose/Quantity: 7 doses  Reference #: [id number]   Insurance Company: [employer] - Phone [phone] Fax [phone]  Which Pharmacy is filling the prescription (Not needed for infusion/clinic administered): [facility]  Pharmacy Notified: Yes         Try to walk every day.    Vitamin D3 2000iu daily.    Try a multivitamin or magnesium 400mg at night.  Drink electrolyte solution 1-2x a day.  Liquid IV or LMNT.   Drink at least 64-80oz a day.

## 2025-08-05 ENCOUNTER — HOME INFUSION (OUTPATIENT)
Dept: HOME HEALTH SERVICES | Facility: HOME HEALTH | Age: 40
End: 2025-08-05
Payer: COMMERCIAL

## 2025-08-07 ENCOUNTER — HOME INFUSION BILLING (OUTPATIENT)
Dept: HOME HEALTH SERVICES | Facility: HOME HEALTH | Age: 40
End: 2025-08-07
Payer: COMMERCIAL

## 2025-08-08 ENCOUNTER — HOME CARE VISIT (OUTPATIENT)
Dept: HOME HEALTH SERVICES | Facility: HOME HEALTH | Age: 40
End: 2025-08-08
Payer: COMMERCIAL

## 2025-08-08 ENCOUNTER — HOME INFUSION BILLING (OUTPATIENT)
Dept: HOME HEALTH SERVICES | Facility: HOME HEALTH | Age: 40
End: 2025-08-08
Payer: COMMERCIAL

## 2025-08-08 VITALS
HEART RATE: 99 BPM | SYSTOLIC BLOOD PRESSURE: 119 MMHG | RESPIRATION RATE: 16 BRPM | TEMPERATURE: 97.1 F | DIASTOLIC BLOOD PRESSURE: 76 MMHG | OXYGEN SATURATION: 100 %

## 2025-08-08 PROCEDURE — 99602 HOME NFS VISIT EACH ADDL HR: CPT | Mod: SS

## 2025-08-08 PROCEDURE — S9359 HIT ANTI-TNF PER DIEM: HCPCS

## 2025-08-08 PROCEDURE — 99601 HOME NFS VISIT <2 HRS: CPT | Mod: SS

## 2025-08-08 ASSESSMENT — PAIN SCALES - GENERAL: PAINLEVEL: NO PAIN (0)

## 2025-09-04 ENCOUNTER — HOME INFUSION BILLING (OUTPATIENT)
Dept: HOME HEALTH SERVICES | Facility: HOME HEALTH | Age: 40
End: 2025-09-04
Payer: COMMERCIAL

## 2025-09-05 ENCOUNTER — HOME INFUSION BILLING (OUTPATIENT)
Dept: HOME HEALTH SERVICES | Facility: HOME HEALTH | Age: 40
End: 2025-09-05
Payer: COMMERCIAL

## (undated) DEVICE — SPONGE LAP 18X18" X8435

## (undated) DEVICE — CLIP HORIZON MED BLUE 002200

## (undated) DEVICE — SU STRATAFIX MONOCRYL 3-0 SPIRAL PS-2 45CM SXMP1B107

## (undated) DEVICE — ESU ELEC BLADE 2.75" COATED/INSULATED E1455

## (undated) DEVICE — DECANTER BAG 2002S

## (undated) DEVICE — STPL SKIN 35W ROTATING HEAD PRW35

## (undated) DEVICE — DRAPE STOCKINETTE IMPERVIOUS 10" 21048

## (undated) DEVICE — Device

## (undated) DEVICE — DERMACARRIER 1.5:1 ZIMMER 00-2195-012-00

## (undated) DEVICE — KIT SPY ELITE DISP LC3006

## (undated) DEVICE — DRSG GAUZE 4X4" 2187

## (undated) DEVICE — ESU PENCIL SMOKE EVAC W/ROCKER SWITCH 0703-047-000

## (undated) DEVICE — DRSG XEROFORM 5X9" CUR253590W

## (undated) DEVICE — DRSG WOUND VAC GRANUFOAM LG BLACK 26X15CM M8275053/5

## (undated) DEVICE — LIGHT HANDLE X1 31140133

## (undated) DEVICE — KIT PROCEDURE SPY-PHI SGL HH9001

## (undated) DEVICE — GLOVE PROTEXIS W/NEU-THERA 7.0  2D73TE70

## (undated) DEVICE — DRAIN JACKSON PRATT CHANNEL 15FR ROUND HUBLESS SIL JP-2228

## (undated) DEVICE — ESU ELEC BLADE 6" COATED E1450-6

## (undated) DEVICE — DRAPE U SPLIT 74X120" 29440

## (undated) DEVICE — DRAPE IOBAN INCISE 23X17" 6650EZ

## (undated) DEVICE — BLADE DERMATOME ZIMMER  00-8800-000-10

## (undated) DEVICE — SOL WATER IRRIG 1000ML BOTTLE 2F7114

## (undated) DEVICE — DECANTER VIAL 2006S

## (undated) DEVICE — LABEL MEDICATION SYSTEM 3303-P

## (undated) DEVICE — LINEN TOWEL PACK X6 WHITE 5487

## (undated) DEVICE — SU MONOCRYL 2-0 SH 27" UND Y417H

## (undated) DEVICE — DRAIN JACKSON PRATT RESERVOIR 100ML SU130-1305

## (undated) DEVICE — SUCTION MANIFOLD NEPTUNE 2 SYS 4 PORT 0702-020-000

## (undated) DEVICE — SU PDS II 0 CTX 36" Z370T

## (undated) DEVICE — CLIP HORIZON SM RED WIDE SLOT 001201

## (undated) DEVICE — LINEN TOWEL PACK X5 5464

## (undated) DEVICE — SOL NACL 0.9% IRRIG 1000ML BOTTLE 2F7124

## (undated) DEVICE — ESU GROUND PAD ADULT W/CORD E7507

## (undated) DEVICE — BANDAGE 5YDX4IN SLFADH ADH STRL LF

## (undated) DEVICE — WIPES FOLEY CARE SURESTEP PROVON DFC100

## (undated) DEVICE — SU ETHILON 3-0 PS-1 18" 1663H

## (undated) DEVICE — BLADE KNIFE SURG 15 371115

## (undated) DEVICE — DRSG WOUND VAC SPONGE MED BLACK M8275052/5

## (undated) DEVICE — SUCTION TIP YANKAUER W/O VENT K86

## (undated) RX ORDER — FENTANYL CITRATE 50 UG/ML
INJECTION, SOLUTION INTRAMUSCULAR; INTRAVENOUS
Status: DISPENSED
Start: 2021-12-27

## (undated) RX ORDER — ONDANSETRON 2 MG/ML
INJECTION INTRAMUSCULAR; INTRAVENOUS
Status: DISPENSED
Start: 2021-12-27

## (undated) RX ORDER — ACETAMINOPHEN 325 MG/1
TABLET ORAL
Status: DISPENSED
Start: 2021-12-27

## (undated) RX ORDER — EPHEDRINE SULFATE 50 MG/ML
INJECTION, SOLUTION INTRAMUSCULAR; INTRAVENOUS; SUBCUTANEOUS
Status: DISPENSED
Start: 2021-12-27

## (undated) RX ORDER — GENTAMICIN 40 MG/ML
INJECTION, SOLUTION INTRAMUSCULAR; INTRAVENOUS
Status: DISPENSED
Start: 2021-12-27

## (undated) RX ORDER — FENTANYL CITRATE-0.9 % NACL/PF 10 MCG/ML
PLASTIC BAG, INJECTION (ML) INTRAVENOUS
Status: DISPENSED
Start: 2021-12-27

## (undated) RX ORDER — LIDOCAINE HYDROCHLORIDE 20 MG/ML
INJECTION, SOLUTION EPIDURAL; INFILTRATION; INTRACAUDAL; PERINEURAL
Status: DISPENSED
Start: 2021-12-27

## (undated) RX ORDER — HYDROMORPHONE HCL IN WATER/PF 6 MG/30 ML
PATIENT CONTROLLED ANALGESIA SYRINGE INTRAVENOUS
Status: DISPENSED
Start: 2021-12-27

## (undated) RX ORDER — VANCOMYCIN HYDROCHLORIDE 1 G/20ML
INJECTION, POWDER, LYOPHILIZED, FOR SOLUTION INTRAVENOUS
Status: DISPENSED
Start: 2021-12-27

## (undated) RX ORDER — ROCURONIUM BROMIDE 50 MG/5 ML
SYRINGE (ML) INTRAVENOUS
Status: DISPENSED
Start: 2021-12-27

## (undated) RX ORDER — CEFAZOLIN SODIUM IN 0.9 % NACL 3 G/100 ML
INTRAVENOUS SOLUTION, PIGGYBACK (ML) INTRAVENOUS
Status: DISPENSED
Start: 2021-12-27

## (undated) RX ORDER — MINERAL OIL
OIL (ML) MISCELLANEOUS
Status: DISPENSED
Start: 2021-12-27

## (undated) RX ORDER — PROPOFOL 10 MG/ML
INJECTION, EMULSION INTRAVENOUS
Status: DISPENSED
Start: 2021-12-27

## (undated) RX ORDER — EPINEPHRINE 1 MG/ML
INJECTION, SOLUTION INTRAMUSCULAR; SUBCUTANEOUS
Status: DISPENSED
Start: 2021-12-27